# Patient Record
Sex: FEMALE | Race: WHITE | Employment: FULL TIME | URBAN - METROPOLITAN AREA
[De-identification: names, ages, dates, MRNs, and addresses within clinical notes are randomized per-mention and may not be internally consistent; named-entity substitution may affect disease eponyms.]

---

## 2018-02-08 ENCOUNTER — APPOINTMENT (OUTPATIENT)
Dept: GENERAL RADIOLOGY | Age: 53
DRG: 149 | End: 2018-02-08
Attending: EMERGENCY MEDICINE
Payer: SUBSIDIZED

## 2018-02-08 ENCOUNTER — APPOINTMENT (OUTPATIENT)
Dept: CT IMAGING | Age: 53
DRG: 149 | End: 2018-02-08
Attending: EMERGENCY MEDICINE
Payer: SUBSIDIZED

## 2018-02-08 ENCOUNTER — APPOINTMENT (OUTPATIENT)
Dept: MRI IMAGING | Age: 53
DRG: 149 | End: 2018-02-08
Attending: INTERNAL MEDICINE
Payer: SUBSIDIZED

## 2018-02-08 ENCOUNTER — HOSPITAL ENCOUNTER (INPATIENT)
Age: 53
LOS: 2 days | Discharge: HOME | DRG: 149 | End: 2018-02-10
Attending: EMERGENCY MEDICINE | Admitting: INTERNAL MEDICINE
Payer: SUBSIDIZED

## 2018-02-08 DIAGNOSIS — R42 VERTIGO: Primary | ICD-10-CM

## 2018-02-08 DIAGNOSIS — I16.0 HYPERTENSIVE URGENCY: ICD-10-CM

## 2018-02-08 PROBLEM — I10 HTN (HYPERTENSION), MALIGNANT: Status: ACTIVE | Noted: 2018-02-08

## 2018-02-08 LAB
ALBUMIN SERPL-MCNC: 3.5 G/DL (ref 3.5–5)
ALBUMIN/GLOB SERPL: 0.7 {RATIO} (ref 1.1–2.2)
ALP SERPL-CCNC: 91 U/L (ref 45–117)
ALT SERPL-CCNC: 14 U/L (ref 12–78)
ANION GAP SERPL CALC-SCNC: 6 MMOL/L (ref 5–15)
APPEARANCE UR: ABNORMAL
AST SERPL-CCNC: 26 U/L (ref 15–37)
ATRIAL RATE: 60 BPM
BACTERIA URNS QL MICRO: NEGATIVE /HPF
BASOPHILS # BLD: 0 K/UL (ref 0–0.1)
BASOPHILS NFR BLD: 0 % (ref 0–1)
BILIRUB SERPL-MCNC: 0.4 MG/DL (ref 0.2–1)
BILIRUB UR QL: NEGATIVE
BUN SERPL-MCNC: 10 MG/DL (ref 6–20)
BUN/CREAT SERPL: 13 (ref 12–20)
CALCIUM SERPL-MCNC: 8.7 MG/DL (ref 8.5–10.1)
CALCULATED R AXIS, ECG10: -160 DEGREES
CALCULATED T AXIS, ECG11: -142 DEGREES
CHLORIDE SERPL-SCNC: 105 MMOL/L (ref 97–108)
CO2 SERPL-SCNC: 28 MMOL/L (ref 21–32)
COLOR UR: ABNORMAL
CREAT SERPL-MCNC: 0.77 MG/DL (ref 0.55–1.02)
DIAGNOSIS, 93000: NORMAL
DIFFERENTIAL METHOD BLD: ABNORMAL
EOSINOPHIL # BLD: 0 K/UL (ref 0–0.4)
EOSINOPHIL NFR BLD: 0 % (ref 0–7)
EPITH CASTS URNS QL MICRO: ABNORMAL /LPF
ERYTHROCYTE [DISTWIDTH] IN BLOOD BY AUTOMATED COUNT: 14 % (ref 11.5–14.5)
GLOBULIN SER CALC-MCNC: 5 G/DL (ref 2–4)
GLUCOSE SERPL-MCNC: 119 MG/DL (ref 65–100)
GLUCOSE UR STRIP.AUTO-MCNC: NEGATIVE MG/DL
HCT VFR BLD AUTO: 40 % (ref 35–47)
HGB BLD-MCNC: 12.5 G/DL (ref 11.5–16)
HGB UR QL STRIP: NEGATIVE
HYALINE CASTS URNS QL MICRO: ABNORMAL /LPF (ref 0–5)
IMM GRANULOCYTES # BLD: 0 K/UL (ref 0–0.04)
IMM GRANULOCYTES NFR BLD AUTO: 0 % (ref 0–0.5)
KETONES UR QL STRIP.AUTO: NEGATIVE MG/DL
LEUKOCYTE ESTERASE UR QL STRIP.AUTO: NEGATIVE
LYMPHOCYTES # BLD: 0.7 K/UL (ref 0.8–3.5)
LYMPHOCYTES NFR BLD: 8 % (ref 12–49)
MCH RBC QN AUTO: 27.1 PG (ref 26–34)
MCHC RBC AUTO-ENTMCNC: 31.3 G/DL (ref 30–36.5)
MCV RBC AUTO: 86.8 FL (ref 80–99)
MONOCYTES # BLD: 0.3 K/UL (ref 0–1)
MONOCYTES NFR BLD: 3 % (ref 5–13)
NEUTS SEG # BLD: 8.3 K/UL (ref 1.8–8)
NEUTS SEG NFR BLD: 89 % (ref 32–75)
NITRITE UR QL STRIP.AUTO: NEGATIVE
NRBC # BLD: 0 K/UL (ref 0–0.01)
NRBC BLD-RTO: 0 PER 100 WBC
P-R INTERVAL, ECG05: 170 MS
PH UR STRIP: 8 [PH] (ref 5–8)
PLATELET # BLD AUTO: 351 K/UL (ref 150–400)
PMV BLD AUTO: 9.1 FL (ref 8.9–12.9)
POTASSIUM SERPL-SCNC: 3.6 MMOL/L (ref 3.5–5.1)
PROT SERPL-MCNC: 8.5 G/DL (ref 6.4–8.2)
PROT UR STRIP-MCNC: NEGATIVE MG/DL
Q-T INTERVAL, ECG07: 428 MS
QRS DURATION, ECG06: 86 MS
QTC CALCULATION (BEZET), ECG08: 428 MS
RBC # BLD AUTO: 4.61 M/UL (ref 3.8–5.2)
RBC #/AREA URNS HPF: ABNORMAL /HPF (ref 0–5)
RBC MORPH BLD: ABNORMAL
SODIUM SERPL-SCNC: 139 MMOL/L (ref 136–145)
SP GR UR REFRACTOMETRY: 1.01 (ref 1–1.03)
TROPONIN I SERPL-MCNC: <0.04 NG/ML
UA: UC IF INDICATED,UAUC: ABNORMAL
UROBILINOGEN UR QL STRIP.AUTO: 0.2 EU/DL (ref 0.2–1)
VENTRICULAR RATE, ECG03: 60 BPM
WBC # BLD AUTO: 9.3 K/UL (ref 3.6–11)
WBC URNS QL MICRO: ABNORMAL /HPF (ref 0–4)

## 2018-02-08 PROCEDURE — 99285 EMERGENCY DEPT VISIT HI MDM: CPT

## 2018-02-08 PROCEDURE — 81001 URINALYSIS AUTO W/SCOPE: CPT | Performed by: EMERGENCY MEDICINE

## 2018-02-08 PROCEDURE — 74011250637 HC RX REV CODE- 250/637: Performed by: INTERNAL MEDICINE

## 2018-02-08 PROCEDURE — 74011250637 HC RX REV CODE- 250/637: Performed by: EMERGENCY MEDICINE

## 2018-02-08 PROCEDURE — 84484 ASSAY OF TROPONIN QUANT: CPT | Performed by: EMERGENCY MEDICINE

## 2018-02-08 PROCEDURE — 70551 MRI BRAIN STEM W/O DYE: CPT

## 2018-02-08 PROCEDURE — 70450 CT HEAD/BRAIN W/O DYE: CPT

## 2018-02-08 PROCEDURE — 93005 ELECTROCARDIOGRAM TRACING: CPT

## 2018-02-08 PROCEDURE — 85025 COMPLETE CBC W/AUTO DIFF WBC: CPT | Performed by: EMERGENCY MEDICINE

## 2018-02-08 PROCEDURE — 74011250636 HC RX REV CODE- 250/636: Performed by: EMERGENCY MEDICINE

## 2018-02-08 PROCEDURE — 36415 COLL VENOUS BLD VENIPUNCTURE: CPT | Performed by: EMERGENCY MEDICINE

## 2018-02-08 PROCEDURE — 96374 THER/PROPH/DIAG INJ IV PUSH: CPT

## 2018-02-08 PROCEDURE — 65660000000 HC RM CCU STEPDOWN

## 2018-02-08 PROCEDURE — 80053 COMPREHEN METABOLIC PANEL: CPT | Performed by: EMERGENCY MEDICINE

## 2018-02-08 PROCEDURE — 71045 X-RAY EXAM CHEST 1 VIEW: CPT

## 2018-02-08 PROCEDURE — 96375 TX/PRO/DX INJ NEW DRUG ADDON: CPT

## 2018-02-08 PROCEDURE — 74011250636 HC RX REV CODE- 250/636: Performed by: INTERNAL MEDICINE

## 2018-02-08 PROCEDURE — 74011000250 HC RX REV CODE- 250: Performed by: INTERNAL MEDICINE

## 2018-02-08 PROCEDURE — 96361 HYDRATE IV INFUSION ADD-ON: CPT

## 2018-02-08 RX ORDER — SODIUM CHLORIDE 0.9 % (FLUSH) 0.9 %
5-10 SYRINGE (ML) INJECTION EVERY 8 HOURS
Status: DISCONTINUED | OUTPATIENT
Start: 2018-02-08 | End: 2018-02-10 | Stop reason: HOSPADM

## 2018-02-08 RX ORDER — HYDROCHLOROTHIAZIDE 25 MG/1
25 TABLET ORAL DAILY
Status: DISCONTINUED | OUTPATIENT
Start: 2018-02-09 | End: 2018-02-10 | Stop reason: HOSPADM

## 2018-02-08 RX ORDER — HYDRALAZINE HYDROCHLORIDE 20 MG/ML
20 INJECTION INTRAMUSCULAR; INTRAVENOUS
Status: COMPLETED | OUTPATIENT
Start: 2018-02-08 | End: 2018-02-08

## 2018-02-08 RX ORDER — FACIAL-BODY WIPES
10 EACH TOPICAL DAILY PRN
Status: DISCONTINUED | OUTPATIENT
Start: 2018-02-08 | End: 2018-02-10 | Stop reason: HOSPADM

## 2018-02-08 RX ORDER — HYDROCHLOROTHIAZIDE 25 MG/1
25 TABLET ORAL
Status: COMPLETED | OUTPATIENT
Start: 2018-02-08 | End: 2018-02-08

## 2018-02-08 RX ORDER — ENOXAPARIN SODIUM 100 MG/ML
40 INJECTION SUBCUTANEOUS EVERY 24 HOURS
Status: DISCONTINUED | OUTPATIENT
Start: 2018-02-08 | End: 2018-02-09

## 2018-02-08 RX ORDER — HYDRALAZINE HYDROCHLORIDE 20 MG/ML
10 INJECTION INTRAMUSCULAR; INTRAVENOUS
Status: DISCONTINUED | OUTPATIENT
Start: 2018-02-08 | End: 2018-02-10 | Stop reason: HOSPADM

## 2018-02-08 RX ORDER — ONDANSETRON 2 MG/ML
4 INJECTION INTRAMUSCULAR; INTRAVENOUS
Status: DISCONTINUED | OUTPATIENT
Start: 2018-02-08 | End: 2018-02-10 | Stop reason: HOSPADM

## 2018-02-08 RX ORDER — LORAZEPAM 2 MG/ML
0.5 INJECTION, SOLUTION INTRAMUSCULAR; INTRAVENOUS ONCE
Status: DISCONTINUED | OUTPATIENT
Start: 2018-02-08 | End: 2018-02-08

## 2018-02-08 RX ORDER — MECLIZINE HCL 12.5 MG 12.5 MG/1
25 TABLET ORAL EVERY 6 HOURS
Status: DISCONTINUED | OUTPATIENT
Start: 2018-02-08 | End: 2018-02-10 | Stop reason: HOSPADM

## 2018-02-08 RX ORDER — MECLIZINE HCL 12.5 MG 12.5 MG/1
25 TABLET ORAL
Status: COMPLETED | OUTPATIENT
Start: 2018-02-08 | End: 2018-02-08

## 2018-02-08 RX ORDER — ASPIRIN 81 MG/1
81 TABLET ORAL DAILY
Status: DISCONTINUED | OUTPATIENT
Start: 2018-02-09 | End: 2018-02-10 | Stop reason: HOSPADM

## 2018-02-08 RX ORDER — CARVEDILOL 12.5 MG/1
12.5 TABLET ORAL 2 TIMES DAILY WITH MEALS
Status: DISCONTINUED | OUTPATIENT
Start: 2018-02-08 | End: 2018-02-10 | Stop reason: HOSPADM

## 2018-02-08 RX ORDER — DIAZEPAM 10 MG/2ML
2 INJECTION INTRAMUSCULAR
Status: DISCONTINUED | OUTPATIENT
Start: 2018-02-08 | End: 2018-02-08

## 2018-02-08 RX ORDER — LORAZEPAM 2 MG/ML
1 INJECTION INTRAMUSCULAR
Status: DISCONTINUED | OUTPATIENT
Start: 2018-02-08 | End: 2018-02-10 | Stop reason: HOSPADM

## 2018-02-08 RX ORDER — SODIUM CHLORIDE 0.9 % (FLUSH) 0.9 %
5-10 SYRINGE (ML) INJECTION AS NEEDED
Status: DISCONTINUED | OUTPATIENT
Start: 2018-02-08 | End: 2018-02-10 | Stop reason: HOSPADM

## 2018-02-08 RX ORDER — LORAZEPAM 2 MG/ML
0.5 INJECTION INTRAMUSCULAR ONCE
Status: COMPLETED | OUTPATIENT
Start: 2018-02-08 | End: 2018-02-08

## 2018-02-08 RX ORDER — AMLODIPINE BESYLATE 5 MG/1
5 TABLET ORAL DAILY
Status: DISCONTINUED | OUTPATIENT
Start: 2018-02-08 | End: 2018-02-09

## 2018-02-08 RX ORDER — ASPIRIN 81 MG/1
81 TABLET ORAL DAILY
COMMUNITY
End: 2019-05-08

## 2018-02-08 RX ADMIN — Medication 10 ML: at 22:13

## 2018-02-08 RX ADMIN — HYDROCHLOROTHIAZIDE 25 MG: 25 TABLET ORAL at 12:51

## 2018-02-08 RX ADMIN — MECLIZINE 25 MG: 12.5 TABLET ORAL at 17:55

## 2018-02-08 RX ADMIN — MECLIZINE 25 MG: 12.5 TABLET ORAL at 10:53

## 2018-02-08 RX ADMIN — SODIUM CHLORIDE 5 MG: 9 INJECTION INTRAMUSCULAR; INTRAVENOUS; SUBCUTANEOUS at 18:25

## 2018-02-08 RX ADMIN — CARVEDILOL 12.5 MG: 12.5 TABLET, FILM COATED ORAL at 15:50

## 2018-02-08 RX ADMIN — AMLODIPINE BESYLATE 5 MG: 5 TABLET ORAL at 17:55

## 2018-02-08 RX ADMIN — ONDANSETRON HYDROCHLORIDE 4 MG: 2 INJECTION, SOLUTION INTRAMUSCULAR; INTRAVENOUS at 22:22

## 2018-02-08 RX ADMIN — HYDRALAZINE HYDROCHLORIDE 20 MG: 20 INJECTION INTRAMUSCULAR; INTRAVENOUS at 10:53

## 2018-02-08 RX ADMIN — LORAZEPAM 0.5 MG: 2 INJECTION INTRAMUSCULAR; INTRAVENOUS at 14:38

## 2018-02-08 RX ADMIN — NITROGLYCERIN 1 INCH: 20 OINTMENT TOPICAL at 17:55

## 2018-02-08 RX ADMIN — HYDRALAZINE HYDROCHLORIDE 10 MG: 20 INJECTION INTRAMUSCULAR; INTRAVENOUS at 15:50

## 2018-02-08 RX ADMIN — ENOXAPARIN SODIUM 40 MG: 40 INJECTION SUBCUTANEOUS at 22:13

## 2018-02-08 RX ADMIN — SODIUM CHLORIDE 1000 ML: 900 INJECTION, SOLUTION INTRAVENOUS at 11:08

## 2018-02-08 NOTE — ED PROVIDER NOTES
EMERGENCY DEPARTMENT HISTORY AND PHYSICAL EXAM      Date: 2/8/2018  Patient Name: Massiel Russo    History of Presenting Illness     Chief Complaint   Patient presents with    Nausea     since this morning at 0500    Vomiting    Dizziness       History Provided By: Patient    HPI: Massiel Russo, 45 y.o. female with PMHx of vertigo presents via EMS to the ED with cc of dizziness that onset at 5 AM after getting off work this morning. Pt also reports HA that started last night, and nausea and chills this morning. Pt states that after the dizziness onset, she had to sit down and was unable to get up for 1 hour because of the severity of her sx. She states that her sx felt similar to vertigo and attributed it to working long hours. Pt reports that leaning backward in a seated position makes the dizziness worse. She states she took 2 81 mg ASA last night with no relief of HA. Pt denies receiving the flu shot this year. She states she used to take HTN medication but was taken off it in 2011. Pt denies CP, fever, or SOB. PCP: PROVIDER UNKNOWN    There are no other complaints, changes, or physical findings at this time. Past History     Past Medical History:  History reviewed. No pertinent past medical history. Past Surgical History:  History reviewed. No pertinent surgical history. Family History:  History reviewed. No pertinent family history. Social History:  Social History   Substance Use Topics    Smoking status: Never Smoker    Smokeless tobacco: Never Used    Alcohol use Yes      Comment: occasional       Allergies: Allergies   Allergen Reactions    Hydrocodone Hives    Tylenol [Acetaminophen] Vertigo         Review of Systems   Review of Systems   Constitutional: Positive for chills. Negative for activity change and fever. HENT: Negative for congestion and sore throat. Eyes: Negative for pain and redness. Respiratory: Negative for cough, chest tightness and shortness of breath. Cardiovascular: Negative for chest pain and palpitations. Gastrointestinal: Positive for nausea. Negative for abdominal pain, diarrhea and vomiting. Genitourinary: Negative for dysuria, frequency and urgency. Musculoskeletal: Negative for back pain and neck pain. Skin: Negative for rash. Neurological: Positive for dizziness and headaches. Negative for syncope and light-headedness. Psychiatric/Behavioral: Negative for confusion. All other systems reviewed and are negative. Physical Exam   Physical Exam   Constitutional: She is oriented to person, place, and time. She appears well-developed and well-nourished. No distress. HENT:   Head: Normocephalic and atraumatic. Nose: Nose normal.   Mouth/Throat: Oropharynx is clear and moist.   Eyes: Conjunctivae and EOM are normal. Pupils are equal, round, and reactive to light. No scleral icterus. Conjunctiva clear bilaterally; Neck: Normal range of motion. Neck supple. No JVD present. No tracheal deviation present. No thyromegaly present. Cardiovascular: Normal rate, regular rhythm and intact distal pulses. Exam reveals no gallop and no friction rub. No murmur heard. Pulmonary/Chest: Effort normal and breath sounds normal. No stridor. No respiratory distress. She has no wheezes. She has no rales. She exhibits no tenderness. Abdominal: Soft. Bowel sounds are normal. She exhibits no distension. There is no tenderness. There is no rebound and no guarding. Musculoskeletal: Normal range of motion. She exhibits no edema or tenderness. Neurological: She is alert and oriented to person, place, and time. She displays normal reflexes. No cranial nerve deficit. She exhibits normal muscle tone.  Coordination normal.   5/5 strength throughout; no past pointing; good heel to shin; negative romberg; holds both arms extended in front of them for 10 seconds without difficulty or drift; lifts legs off of bed without difficulty; no pronator drift; good equal  strength bilaterally; motor and sensory grossly intact; no facial asymmetry; +nystagmus on lateral gaze bilaterally; Non-suspicious HINTS exam.    Skin: Skin is warm and dry. No rash noted. She is not diaphoretic. No erythema. Psychiatric: She has a normal mood and affect. Her behavior is normal.   Nursing note and vitals reviewed. Diagnostic Study Results     Labs -     Recent Results (from the past 12 hour(s))   EKG, 12 LEAD, INITIAL    Collection Time: 02/08/18  8:03 AM   Result Value Ref Range    Ventricular Rate 60 BPM    Atrial Rate 60 BPM    P-R Interval 170 ms    QRS Duration 86 ms    Q-T Interval 428 ms    QTC Calculation (Bezet) 428 ms    Calculated R Axis -160 degrees    Calculated T Axis -142 degrees    Diagnosis       Normal sinus rhythm with sinus arrhythmia  Right superior axis deviation  ST & T wave abnormality, consider inferolateral ischemia  When compared with ECG of 01-DEC-2010 18:21,  ST no longer elevated in Lateral leads  T wave inversion now evident in Anterolateral leads  Confirmed by Deandre Gentile PBRENDA ([id number]) on 2/8/2018 10:59:43 AM     CBC WITH AUTOMATED DIFF    Collection Time: 02/08/18  9:45 AM   Result Value Ref Range    WBC 9.3 3.6 - 11.0 K/uL    RBC 4.61 3.80 - 5.20 M/uL    HGB 12.5 11.5 - 16.0 g/dL    HCT 40.0 35.0 - 47.0 %    MCV 86.8 80.0 - 99.0 FL    MCH 27.1 26.0 - 34.0 PG    MCHC 31.3 30.0 - 36.5 g/dL    RDW 14.0 11.5 - 14.5 %    PLATELET 348 543 - 761 K/uL    MPV 9.1 8.9 - 12.9 FL    NRBC 0.0 0  WBC    ABSOLUTE NRBC 0.00 0.00 - 0.01 K/uL    NEUTROPHILS 89 (H) 32 - 75 %    LYMPHOCYTES 8 (L) 12 - 49 %    MONOCYTES 3 (L) 5 - 13 %    EOSINOPHILS 0 0 - 7 %    BASOPHILS 0 0 - 1 %    IMMATURE GRANULOCYTES 0 0.0 - 0.5 %    ABS. NEUTROPHILS 8.3 (H) 1.8 - 8.0 K/UL    ABS. LYMPHOCYTES 0.7 (L) 0.8 - 3.5 K/UL    ABS. MONOCYTES 0.3 0.0 - 1.0 K/UL    ABS. EOSINOPHILS 0.0 0.0 - 0.4 K/UL    ABS. BASOPHILS 0.0 0.0 - 0.1 K/UL    ABS. IMM.  GRANS. 0.0 0.00 - 0.04 K/UL DF AUTOMATED      RBC COMMENTS NORMOCYTIC, NORMOCHROMIC     METABOLIC PANEL, COMPREHENSIVE    Collection Time: 02/08/18  9:45 AM   Result Value Ref Range    Sodium 139 136 - 145 mmol/L    Potassium 3.6 3.5 - 5.1 mmol/L    Chloride 105 97 - 108 mmol/L    CO2 28 21 - 32 mmol/L    Anion gap 6 5 - 15 mmol/L    Glucose 119 (H) 65 - 100 mg/dL    BUN 10 6 - 20 MG/DL    Creatinine 0.77 0.55 - 1.02 MG/DL    BUN/Creatinine ratio 13 12 - 20      GFR est AA >60 >60 ml/min/1.73m2    GFR est non-AA >60 >60 ml/min/1.73m2    Calcium 8.7 8.5 - 10.1 MG/DL    Bilirubin, total 0.4 0.2 - 1.0 MG/DL    ALT (SGPT) 14 12 - 78 U/L    AST (SGOT) 26 15 - 37 U/L    Alk. phosphatase 91 45 - 117 U/L    Protein, total 8.5 (H) 6.4 - 8.2 g/dL    Albumin 3.5 3.5 - 5.0 g/dL    Globulin 5.0 (H) 2.0 - 4.0 g/dL    A-G Ratio 0.7 (L) 1.1 - 2.2     TROPONIN I    Collection Time: 02/08/18  9:45 AM   Result Value Ref Range    Troponin-I, Qt. <0.04 <0.05 ng/mL   URINALYSIS W/ REFLEX CULTURE    Collection Time: 02/08/18 11:09 AM   Result Value Ref Range    Color YELLOW/STRAW      Appearance CLOUDY (A) CLEAR      Specific gravity 1.012 1.003 - 1.030      pH (UA) 8.0 5.0 - 8.0      Protein NEGATIVE  NEG mg/dL    Glucose NEGATIVE  NEG mg/dL    Ketone NEGATIVE  NEG mg/dL    Bilirubin NEGATIVE  NEG      Blood NEGATIVE  NEG      Urobilinogen 0.2 0.2 - 1.0 EU/dL    Nitrites NEGATIVE  NEG      Leukocyte Esterase NEGATIVE  NEG      WBC 0-4 0 - 4 /hpf    RBC 0-5 0 - 5 /hpf    Epithelial cells MODERATE (A) FEW /lpf    Bacteria NEGATIVE  NEG /hpf    UA:UC IF INDICATED CULTURE NOT INDICATED BY UA RESULT CNI      Hyaline cast 0-2 0 - 5 /lpf       Radiologic Studies -   CT Results  (Last 48 hours)               02/08/18 1124  CT HEAD WO CONT Final result    Impression:  IMPRESSION: No acute abnormality. Narrative:  EXAM:  CT HEAD WO CONT       INDICATION:   Dizziness, nausea, vomiting       COMPARISON: None. CONTRAST:  None.        TECHNIQUE: Unenhanced CT of the head was performed using 5 mm images. Brain and   bone windows were generated. CT dose reduction was achieved through use of a   standardized protocol tailored for this examination and automatic exposure   control for dose modulation. FINDINGS:   The ventricles and sulci are normal in size, shape and configuration and   midline. There is no significant white matter disease. There is no intracranial   hemorrhage, extra-axial collection, mass, mass effect or midline shift. The   basilar cisterns are open. No acute infarct is identified. The bone windows   demonstrate no abnormalities. The visualized portions of the paranasal sinuses   and mastoid air cells are clear. CXR Results  (Last 48 hours)               02/08/18 1158  XR CHEST PORT Final result    Impression:  Impression: No acute process. Narrative: Indication: Nausea, vomiting, dizziness this morning       Comparison: None       Portable exam of the chest obtained at 1157 demonstrates normal heart size. There is no acute process in the lung fields. The osseous structures are   unremarkable. Medical Decision Making   I am the first provider for this patient. I reviewed the vital signs, available nursing notes, past medical history, past surgical history, family history and social history. Vital Signs-Reviewed the patient's vital signs. Patient Vitals for the past 12 hrs:   Temp Pulse Resp BP SpO2   02/08/18 1230 - - - (!) 175/103 98 %   02/08/18 1200 - - - (!) 181/108 98 %   02/08/18 1136 - - - (!) 180/100 98 %   02/08/18 1113 - - - - 98 %   02/08/18 1112 - - - (!) 177/98 -   02/08/18 1030 - - - (!) 184/111 96 %   02/08/18 0758 98.2 °F (36.8 °C) (!) 59 16 (!) 159/123 100 %       ED EKG interpretation: 08:03  Rhythm: normal sinus rhythm; and regular .  Rate (approx.): 60; Axis: right axis deviation; MD Interval: normal; QRS interval: normal ; ST/T wave: non-specific changes; t inversion v3-v6; new since 2010; This EKG was interpreted by Norma Lopez MD,ED Provider. Records Reviewed: Old Medical Records    Provider Notes (Medical Decision Making):   DDx: vertigo, ICH, hypertensive urgency    ED Course:   Initial assessment performed. The patients presenting problems have been discussed, and they are in agreement with the care plan formulated and outlined with them. I have encouraged them to ask questions as they arise throughout their visit. 2:10 PM  Pt still reporting dizziness. Consult Note:  2:19 PM  Norma Lopez MD spoke with Florinda Pickering MD  Specialty: Hospitalist  Discussed pt's hx, disposition, and available diagnostic and imaging results. Reviewed care plans. Consultant agrees with plans as outlined. Dr. Kayode Madrid will admit. Disposition:  Admit Note:  2:22 PM  Pt is being admitted by Dr. Kayode Madrid. The results of their tests and reason(s) for their admission have been discussed with pt and/or available family. They convey agreement and understanding for the need to be admitted and for admission diagnosis. Will admit to hospitalist for vertigo and hypertensive urgency; pt currently not on any antihypertensives; pt still vertiginous and with no sig improvement after meclizine or valium; no neuro deficits; head ct without acute abnormality; cbc,cmp, and troponin unremarkable;  Norma Lopez MD      Diagnosis     Clinical Impression:   1. Vertigo    2. Hypertensive urgency        Attestations: This note is prepared by Agusto Mustafa, acting as a Scribe for MD Norma George MD: The scribe's documentation has been prepared under my direction and personally reviewed by me in its entirety. I confirm that the notes above accurately reflects all work, treatment, procedures, and medical decision making performed by me.

## 2018-02-08 NOTE — ED TRIAGE NOTES
Pt states that she started vomiting this am.  States that she had some abd pain last night. States that she has sinus problems.   States that she started vomiting up blood this am. States had a normal bm this am.

## 2018-02-08 NOTE — ED NOTES
TRANSFER - OUT REPORT:    Verbal report given to Ginny Rand RN(name) on [employer]  being transferred to Critical access hospital(unit) for routine progression of care       Report consisted of patients Situation, Background, Assessment and   Recommendations(SBAR). Information from the following report(s) SBAR was reviewed with the receiving nurse. Lines:   Peripheral IV 02/08/18 Right Antecubital (Active)        Opportunity for questions and clarification was provided.       Patient transported with:   "[employer]

## 2018-02-08 NOTE — IP AVS SNAPSHOT
Summary of Care Report The Summary of Care report has been created to help improve care coordination. Users with access to [employer] or [employer] (Web-based application) may access additional patient information including the Discharge Summary. If you are not currently a [address] user and need more information, please call the number listed below in the [place] [address] section and ask to be connected with Medical Records. Facility Information Name Address Phone [address] [address] [phone] Patient Information Patient Name Sex  Octavio Segovia ([id number]) Female 1965 Discharge Information Admitting Provider Service Area Unit Medina Munson MD / [facility] / [phone] Discharge Provider Discharge Date/Time Discharge Disposition Destination (none) 2/10/2018 (Pending) AHR (none) Patient Language Language ENGLISH [13] Hospital Problems as of 2/10/2018  Reviewed: 2/10/2018  9:29 AM by Luly Garcia MD  
  
  
  
 Class Noted - Resolved Last Modified POA Active Problems HTN (hypertension), malignant  2018 - Present 2018 by Medina Munson MD Unknown Entered by Medina Munson MD  
  
Non-Hospital Problems as of 2/10/2018  Reviewed: 2/10/2018  9:29 AM by Luly Garcia MD  
 None You are allergic to the following Allergen Reactions Hydrocodone Hives Tylenol (Acetaminophen) Vertigo Current Discharge Medication List  
  
START taking these medications Dose & Instructions Dispensing Information Comments  
 atorvastatin 40 mg tablet Commonly known as:  LIPITOR Dose:  40 mg Take 1 Tab by mouth nightly. Quantity:  30 Tab Refills:  0  
   
 carvedilol 12.5 mg tablet Commonly known as:  Michelle Rings  Dose:  12.5 mg  
 Take 1 Tab by mouth two (2) times daily (with meals). Quantity:  60 Tab Refills:  0  
   
 hydroCHLOROthiazide 25 mg tablet Commonly known as:  HYDRODIURIL Start taking on:  2/11/2018 Dose:  25 mg Take 1 Tab by mouth daily. Quantity:  30 Tab Refills:  0  
   
 meclizine 25 mg tablet Commonly known as:  ANTIVERT Dose:  25 mg Take 1 Tab by mouth three (3) times daily as needed for up to 10 days. Quantity:  40 Tab Refills:  0  
   
 pseudoephedrine hcl 15 mg/5 mL Liqd Commonly known as:  SUDAFED Dose:  15 mg Take 5 mL by mouth every four (4) hours as needed. Quantity:  1 Bottle Refills:  0 CONTINUE these medications which have NOT CHANGED Dose & Instructions Dispensing Information Comments  
 aspirin delayed-release 81 mg tablet Dose:  81 mg Take 81 mg by mouth daily. Refills:  0 Current Immunizations Name Date Influenza Vaccine (Quad) PF 2/9/2018 Follow-up Information Follow up With Details Comments Contact Info Provider Unknown   Patient not available to ask [facility] In 2 weeks  [address] [facility] [address] [facility] [address] Discharge Instructions Epley Maneuver for Vertigo: Exercises Your Care Instructions The Epley Maneuver is a series of movements your doctor may use to treat your vertigo. Here are the steps for the exercises. Your doctor or physical therapist will guide you through the movements. A single 10- to 15-minute session often is all that's needed. Crystal debris (canaliths) cause the vertigo. When your head is moved into different positions, the debris moves freely. This may cause your symptoms to stop. How to do the exercises Step 1 
 
1. You will sit on the doctor's exam table. Your legs will be out in front of you.  The doctor or physical therapist will turn your head so that it is [facility] between looking straight ahead and looking to the side that causes the worst vertigo. 2. Without changing your head position, he or she will guide you back quickly. Your shoulders will be on the table. Your head will hang over the edge of the table. At this point, the side of your head that is causing the worst vertigo will face the floor. You'll stay in this position for 30 seconds or until your symptoms stop. Step 2 1. Then, the doctor or physical therapist will turn your head to the other side. You don't need to lift your head. The other side of your head will face the floor. You will stay in this position for 30 seconds or until your symptoms stop. Step 3 1. The doctor or physical therapist will help you roll your body in the same direction that your head is facing. You will lie on your side. (For example, if you are looking to your right, you will roll onto your right side.) The side that causes the worst symptoms should be facing up. You'll stay in this position for another 30 seconds or until your symptoms stop. Step 4 
 
1. The doctor or physical therapist will then help you to sit back up. Your legs will hang off the table on the same side that you were facing. Follow-up care is a key part of your treatment and safety. Be sure to make and go to all appointments, and call your doctor if you are having problems. It's also a good idea to know your test results and keep a list of the medicines you take. Where can you learn more? Go to http://kelsey-swapna.info/. Enter B077 in the search box to learn more about \"Epley Maneuver for Vertigo: Exercises. \" Current as of: May 12, 2017 Content Version: 11.4 © 3206-9810 [employer]. Care instructions adapted under license by [employer] (which disclaims liability or warranty for this information).  If you have questions about a medical condition or this instruction, always ask your healthcare professional. [place] [address] any warranty or liability for your use of this information. Chart Review Routing History Recipient Method Report Sent By Brenton Krishna Provider Unknown, MD  
Patient not available to ask [address] [employer] Mail IP Auto Routed Notes Vanesa Reyes MD [[id number]] 2/8/2018  4:03 PM 02/08/2018 Provider Unknown, MD  
Patient not available to ask [address] [employer] Mail IP Auto Routed Notes Josr Matthew MD [[id number]] 2/10/2018  9:38 AM 02/10/2018

## 2018-02-08 NOTE — PROGRESS NOTES
Pharmacy Clarification of Prior to Admission Medication Regimen     The patient was interviewed regarding clarification of the prior to admission medication regimen and was questioned regarding use of any other inhalers, topical products, over the counter medications, herbal medications, vitamin products or ophthalmic/nasal/otic medication use. Information Obtained From: Patient    Pertinent Pharmacy Findings:   Updated patients preferred outpatient pharmacy to: [address], [place] - [address] [facility]   aspirin delayed-release 81 mg tablet: Patient stated she took 2 tablets last night for \"sinus pressure. \"    PTA medication list was corrected to the following:     Prior to Admission Medications   Prescriptions Last Dose Informant Patient Reported? Taking?   aspirin delayed-release 81 mg tablet  Self Yes Yes   Sig: Take 81 mg by mouth daily.       Facility-Administered Medications: None          Thank you,  Deborah Nissen, CPhT  Medication History Pharmacy Technician

## 2018-02-08 NOTE — H&P
Hospitalist Admission Note    NAME: Giovanna Reeves   :  1965   MRN:  [id number]     Date/Time:  2018 3:49 PM    Patient PCP: PROVIDER UNKNOWN  ______________________________________________________________________  Given the patient's current clinical presentation, I have a high level of concern for decompensation if discharged from the emergency department. Complex decision making was performed, which includes reviewing the patient's available past medical records, laboratory results, and x-ray films. My assessment of this patient's clinical condition and my plan of care is as follows. Assessment / Plan: Acclerated HTN   -hx of htn and was on medications at one point. Pt states she was taken off meds in . Had not seen a doctor since then  -troponin neg, EKG with NSR.  R axis deviation. -head CT neg   -elevated BP likely due to untreated HTN exacerbated by nausea/vomiting  -BP remains elevated despite HCTZ 25mg and IV  Hydralazine 20mg  -will start amlodipine, coreg, and nitropaste  -hydralazine prn  -check Echo    BPV vs CVA  -pt with hx of vertigo in the past.  She presented here with severe HA, dizziness, nausea and vomiting. No other neurological symptoms. -head CT negative, will order MRI to r/o posterior circulation cva  -further work up pending MRI. Echo already ordered for malignant HTN  -check labs: TSH, lipids, A1C  -scheduled meclizine, antiemetics/xzofran prn  -neuro consult depending on MRI result  -PT/OT    Morbid obesity  Body mass index is 47.77 kg/(m^2). Code status: Full  Prophylaxis: Lovenox  Recommended Disposition: Home w/Family      Subjective:   CHIEF COMPLAINT: dizziness/n/v    HISTORY OF PRESENT ILLNESS:     Giovanna Reeves is a 45 y.o.  female with pmhx significant for hx of vertigo, HTN, no longer on medications since , morbid obesity, present to ED c/o sudden onset of dizziness associated with headache, nausea and vomiting. Symptoms started around last night and worst this morning when she was getting off work ~5AM  Pt states her dizziness is worst with positional changes. She took ASA 81mg x2 last night, however STEWARD persisted. Pt is currently not on any medications and have not seen a doctor since 2010. She denies any associated fever, chills, cp, sob, palpitations. No changes in vision or other neurological deficits. In the ER, vitals; T98.2, P 59, /111, SpO2 100% on RA. Labs: trop neg, cr 0.77  Head CT neg, CXR neg. We were asked to admit for work up and evaluation of the above problems. Past Medical History:   Diagnosis Date    HTN (hypertension)     Vertigo         History reviewed. No pertinent surgical history. Social History   Substance Use Topics    Smoking status: Never Smoker    Smokeless tobacco: Never Used    Alcohol use Yes      Comment: occasional        Family History   Problem Relation Age of Onset    No Known Problems Mother     No Known Problems Father      Allergies   Allergen Reactions    Hydrocodone Hives    Tylenol [Acetaminophen] Vertigo        Prior to Admission medications    Medication Sig Start Date End Date Taking? Authorizing Provider   aspirin delayed-release 81 mg tablet Take 81 mg by mouth daily. Yes Historical Provider       REVIEW OF SYSTEMS:     I am not able to complete the review of systems because:    The patient is intubated and sedated    The patient has altered mental status due to his acute medical problems    The patient has baseline aphasia from prior stroke(s)    The patient has baseline dementia and is not reliable historian    The patient is in acute medical distress and unable to provide information           Total of 12 systems reviewed as follows:       POSITIVE= BOLD text  Negative = text not BOLD  General:  fever, chills, sweats, generalized weakness, weight loss/gain,      loss of appetite   Eyes:    blurred vision, eye pain, loss of vision, double vision  ENT:    rhinorrhea, pharyngitis   Respiratory:   cough, sputum production, SOB, SANCHEZ, wheezing, pleuritic pain   Cardiology:   chest pain, palpitations, orthopnea, PND, edema, syncope   Gastrointestinal:  abdominal pain , N/V, diarrhea, dysphagia, constipation, bleeding   Genitourinary:  frequency, urgency, dysuria, hematuria, incontinence   Muskuloskeletal :  arthralgia, myalgia, back pain  Hematology:  easy bruising, nose or gum bleeding, lymphadenopathy   Dermatological: rash, ulceration, pruritis, color change / jaundice  Endocrine:   hot flashes or polydipsia   Neurological:  headache, dizziness, confusion, focal weakness, paresthesia,     Speech difficulties, memory loss, gait difficulty  Psychological: Feelings of anxiety, depression, agitation    Objective:   VITALS:    Visit Vitals    BP (!) 175/103    Pulse (!) 59    Temp 98.2 °F (36.8 °C)    Resp 16    Ht 5' 8\" (1.727 m)    Wt 142.5 kg (314 lb 2.5 oz)    SpO2 98%    BMI 47.77 kg/m2       PHYSICAL EXAM:    General:    Alert, cooperative, no distress, appears stated age. HEENT: Atraumatic, anicteric sclerae, pink conjunctivae     No oral ulcers, mucosa moist, throat clear, dentition fair  Neck:  Supple, symmetrical,  thyroid: non tender  Lungs:   Clear to auscultation bilaterally. No Wheezing or Rhonchi. No rales. Chest wall:  No tenderness  No Accessory muscle use. Heart:   Regular  rhythm,  No  murmur   No edema  Abdomen:   Soft, non-tender. Not distended. Bowel sounds normal  Extremities: No cyanosis. No clubbing,      Skin turgor normal, Capillary refill normal, Radial dial pulse 2+  Skin:     Not pale. Not Jaundiced  No rashes   Psych:  Good insight. Not depressed. Not anxious or agitated. Neurologic: EOMs intact. No facial asymmetry. No aphasia or slurred speech. Symmetrical strength, Sensation grossly intact.  Alert and oriented X 4.     _______________________________________________________________________  Care Plan discussed with:    Comments   Patient x    Family      RN x    Care Manager                    Consultant:      _______________________________________________________________________  Expected  Disposition:   Home with Family x   HH/PT/OT/RN    SNF/LTC    [facility]    ________________________________________________________________________  TOTAL TIME:  72 Minutes    Critical Care Provided     Minutes non procedure based      Comments    x Reviewed previous records   >50% of visit spent in counseling and coordination of care x Discussion with patient and/or family and questions answered       ________________________________________________________________________  Signed: Des Lowe MD    Procedures: see electronic medical records for all procedures/Xrays and details which were not copied into this note but were reviewed prior to creation of Plan.     LAB DATA REVIEWED:    Recent Results (from the past 24 hour(s))   EKG, 12 LEAD, INITIAL    Collection Time: 02/08/18  8:03 AM   Result Value Ref Range    Ventricular Rate 60 BPM    Atrial Rate 60 BPM    P-R Interval 170 ms    QRS Duration 86 ms    Q-T Interval 428 ms    QTC Calculation (Bezet) 428 ms    Calculated R Axis -160 degrees    Calculated T Axis -142 degrees    Diagnosis       Normal sinus rhythm with sinus arrhythmia  Right superior axis deviation  ST & T wave abnormality, consider inferolateral ischemia  When compared with ECG of 01-DEC-2010 18:21,  ST no longer elevated in Lateral leads  T wave inversion now evident in Anterolateral leads  Confirmed by Cherelle Vargas, P.V. ([id number]) on 2/8/2018 10:59:43 AM     CBC WITH AUTOMATED DIFF    Collection Time: 02/08/18  9:45 AM   Result Value Ref Range    WBC 9.3 3.6 - 11.0 K/uL    RBC 4.61 3.80 - 5.20 M/uL    HGB 12.5 11.5 - 16.0 g/dL    HCT 40.0 35.0 - 47.0 %    MCV 86.8 80.0 - 99.0 FL    MCH 27.1 26.0 - 34.0 PG    MCHC 31.3 30.0 - 36.5 g/dL    RDW 14.0 11.5 - 14.5 %    PLATELET 842 597 - 936 K/uL    MPV 9.1 8.9 - 12.9 FL NRBC 0.0 0  WBC    ABSOLUTE NRBC 0.00 0.00 - 0.01 K/uL    NEUTROPHILS 89 (H) 32 - 75 %    LYMPHOCYTES 8 (L) 12 - 49 %    MONOCYTES 3 (L) 5 - 13 %    EOSINOPHILS 0 0 - 7 %    BASOPHILS 0 0 - 1 %    IMMATURE GRANULOCYTES 0 0.0 - 0.5 %    ABS. NEUTROPHILS 8.3 (H) 1.8 - 8.0 K/UL    ABS. LYMPHOCYTES 0.7 (L) 0.8 - 3.5 K/UL    ABS. MONOCYTES 0.3 0.0 - 1.0 K/UL    ABS. EOSINOPHILS 0.0 0.0 - 0.4 K/UL    ABS. BASOPHILS 0.0 0.0 - 0.1 K/UL    ABS. IMM. GRANS. 0.0 0.00 - 0.04 K/UL    DF AUTOMATED      RBC COMMENTS NORMOCYTIC, NORMOCHROMIC     METABOLIC PANEL, COMPREHENSIVE    Collection Time: 02/08/18  9:45 AM   Result Value Ref Range    Sodium 139 136 - 145 mmol/L    Potassium 3.6 3.5 - 5.1 mmol/L    Chloride 105 97 - 108 mmol/L    CO2 28 21 - 32 mmol/L    Anion gap 6 5 - 15 mmol/L    Glucose 119 (H) 65 - 100 mg/dL    BUN 10 6 - 20 MG/DL    Creatinine 0.77 0.55 - 1.02 MG/DL    BUN/Creatinine ratio 13 12 - 20      GFR est AA >60 >60 ml/min/1.73m2    GFR est non-AA >60 >60 ml/min/1.73m2    Calcium 8.7 8.5 - 10.1 MG/DL    Bilirubin, total 0.4 0.2 - 1.0 MG/DL    ALT (SGPT) 14 12 - 78 U/L    AST (SGOT) 26 15 - 37 U/L    Alk.  phosphatase 91 45 - 117 U/L    Protein, total 8.5 (H) 6.4 - 8.2 g/dL    Albumin 3.5 3.5 - 5.0 g/dL    Globulin 5.0 (H) 2.0 - 4.0 g/dL    A-G Ratio 0.7 (L) 1.1 - 2.2     TROPONIN I    Collection Time: 02/08/18  9:45 AM   Result Value Ref Range    Troponin-I, Qt. <0.04 <0.05 ng/mL   URINALYSIS W/ REFLEX CULTURE    Collection Time: 02/08/18 11:09 AM   Result Value Ref Range    Color YELLOW/STRAW      Appearance CLOUDY (A) CLEAR      Specific gravity 1.012 1.003 - 1.030      pH (UA) 8.0 5.0 - 8.0      Protein NEGATIVE  NEG mg/dL    Glucose NEGATIVE  NEG mg/dL    Ketone NEGATIVE  NEG mg/dL    Bilirubin NEGATIVE  NEG      Blood NEGATIVE  NEG      Urobilinogen 0.2 0.2 - 1.0 EU/dL    Nitrites NEGATIVE  NEG      Leukocyte Esterase NEGATIVE  NEG      WBC 0-4 0 - 4 /hpf    RBC 0-5 0 - 5 /hpf    Epithelial cells MODERATE (A) FEW /lpf    Bacteria NEGATIVE  NEG /hpf    UA:UC IF INDICATED CULTURE NOT INDICATED BY UA RESULT CNI      Hyaline cast 0-2 0 - 5 /lpf

## 2018-02-08 NOTE — PROGRESS NOTES
1900 Received report from [facility], [facility]. Assumed patient care. Bedside shift change report given to Imani Jc RN (oncoming nurse) by Reid oDan RN (offgoing nurse). Report included the following information SBAR, Kardex, Intake/Output, MAR, Recent Results and Cardiac Rhythm NSR.

## 2018-02-08 NOTE — ED NOTES
Pt returned form ambulating back from bathroom and c/o sob. Reassured pt that vs were baseline and wnl and that will monitor. Noted that pt in no resp distress or discomfort at present.

## 2018-02-08 NOTE — ED NOTES
Pt returned from CT. States that she is having chest pain. MD notified. Pt notified that CXR has been added. Will continue to monitor.

## 2018-02-08 NOTE — IP AVS SNAPSHOT
[address] [facility] 
[phone] Patient: Cami Haines MRN: [id number] WMU:[id number] About your hospitalization You were admitted on:  February 8, 2018 You last received care in the:  [facility] 2 CARDIOPULMONARY CARE You were discharged on:  February 10, 2018 Why you were hospitalized Your primary diagnosis was:  Not on File Your diagnoses also included:  Htn (Hypertension), Malignant Follow-up Information Follow up With Details Comments Contact Info Provider Unknown   Patient not available to ask [facility] In 2 weeks  [address] [address] Discharge Orders None A check jailyn indicates which time of day the medication should be taken. My Medications START taking these medications Instructions Each Dose to Equal  
 Morning Noon Evening Bedtime  
 atorvastatin 40 mg tablet Commonly known as:  LIPITOR Your next dose is:  tonight Take 1 Tab by mouth nightly. 40 mg  
    
   
   
   
  
  
 carvedilol 12.5 mg tablet Commonly known as:  Alyssia Hard Your next dose is:  tonight Take 1 Tab by mouth two (2) times daily (with meals). 12.5 mg  
    
   
   
  
   
  
 hydroCHLOROthiazide 25 mg tablet Commonly known as:  HYDRODIURIL Start taking on:  2/11/2018 Your next dose is:  tomorrow Take 1 Tab by mouth daily. 25 mg  
    
  
   
   
   
  
 meclizine 25 mg tablet Commonly known as:  ANTIVERT Your next dose is:  today Take 1 Tab by mouth three (3) times daily as needed for up to 10 days. 25 mg  
    
   
   
  
   
  
 pseudoephedrine hcl 15 mg/5 mL Liqd Commonly known as:  SUDAFED Take 5 mL by mouth every four (4) hours as needed. 15 mg CONTINUE taking these medications Instructions Each Dose to Equal  
 Morning Noon Evening Bedtime aspirin delayed-release 81 mg tablet Your next dose is:  Tomorrow Take 81 mg by mouth daily. 81 mg Where to Get Your Medications Information on where to get these meds will be given to you by the nurse or doctor. ! Ask your nurse or doctor about these medications  
  atorvastatin 40 mg tablet  
 carvedilol 12.5 mg tablet  
 hydroCHLOROthiazide 25 mg tablet  
 meclizine 25 mg tablet  
 pseudoephedrine hcl 15 mg/5 mL Liqd Discharge Instructions Epley Maneuver for Vertigo: Exercises Your Care Instructions The Epley Maneuver is a series of movements your doctor may use to treat your vertigo. Here are the steps for the exercises. Your doctor or physical therapist will guide you through the movements. A single 10- to 15-minute session often is all that's needed. Crystal debris (canaliths) cause the vertigo. When your head is moved into different positions, the debris moves freely. This may cause your symptoms to stop. How to do the exercises Step 1 
 
1. You will sit on the doctor's exam table. Your legs will be out in front of you. The doctor or physical therapist will turn your head so that it is long-term between looking straight ahead and looking to the side that causes the worst vertigo. 2. Without changing your head position, he or she will guide you back quickly. Your shoulders will be on the table. Your head will hang over the edge of the table. At this point, the side of your head that is causing the worst vertigo will face the floor. You'll stay in this position for 30 seconds or until your symptoms stop. Step 2 1. Then, the doctor or physical therapist will turn your head to the other side. You don't need to lift your head. The other side of your head will face the floor. You will stay in this position for 30 seconds or until your symptoms stop. Step 3 1. The doctor or physical therapist will help you roll your body in the same direction that your head is facing. You will lie on your side. (For example, if you are looking to your right, you will roll onto your right side.) The side that causes the worst symptoms should be facing up. You'll stay in this position for another 30 seconds or until your symptoms stop. Step 4 
 
1. The doctor or physical therapist will then help you to sit back up. Your legs will hang off the table on the same side that you were facing. Follow-up care is a key part of your treatment and safety. Be sure to make and go to all appointments, and call your doctor if you are having problems. It's also a good idea to know your test results and keep a list of the medicines you take. Where can you learn more? Go to http://kelsey-swapna.info/. Enter K157 in the search box to learn more about \"Epley Maneuver for Vertigo: Exercises. \" Current as of: May 12, 2017 Content Version: 11.4 © 6200-4750 Healthwise, Incorporated. Care instructions adapted under license by [employer] (which disclaims liability or warranty for this information). If you have questions about a medical condition or this instruction, always ask your healthcare professional. [address] any warranty or liability for your use of this information. UBIKOD Announcement We are excited to announce that we are making your provider's discharge notes available to you in UBIKOD. You will see these notes when they are completed and signed by the physician that discharged you from your recent hospital stay. If you have any questions or concerns about any information you see in UBIKOD, please call the Health Information Department where you were seen or reach out to your Primary Care Provider for more information about your plan of care. Introducing [facility] SERVICES! Jessica Blanton introduces [email or web address] patient portal. Now you can access parts of your medical record, email your doctor's office, and request medication refills online. 1. In your internet browser, go to [email or web address] 2. Click on the First Time User? Click Here link in the Sign In box. You will see the New Member Sign Up page. 3. Enter your [facility] Access Code exactly as it appears below. You will not need to use this code after youve completed the sign-up process. If you do not sign up before the expiration date, you must request a new code. · [facility] Access Code: [email or web address] Expires: 5/11/2018 11:33 AM 
 
4. Enter the last four digits of your Social Security Number (xxxx) and Date of Birth (mm/dd/yyyy) as indicated and click Submit. You will be taken to the next sign-up page. 5. Create a [email or web address] ID. This will be your [email or web address] login ID and cannot be changed, so think of one that is secure and easy to remember. 6. Create a [facility] password. You can change your password at any time. 7. Enter your Password Reset Question and Answer. This can be used at a later time if you forget your password. 8. Enter your e-mail address. You will receive e-mail notification when new information is available in [address] 9. Click Sign Up. You can now view and download portions of your medical record. 10. Click the Download Summary menu link to download a portable copy of your medical information. If you have questions, please visit the Frequently Asked Questions section of the [email or web address] website. Remember, [email or web address] is NOT to be used for urgent needs. For medical emergencies, dial 911. Now available from your iPhone and Android! Providers Seen During Your Hospitalization Provider Specialty Primary office phone Nemo Mantilla MD Emergency Medicine [phone] Rose Shields MD Internal Medicine [phone] Immunizations Administered for This Admission Name Date Influenza Vaccine (Quad) PF 2/9/2018 Your Primary Care Physician (PCP) Primary Care Physician Office Phone Office Fax UNKNOWN, PROVIDER ** None ** ** None ** You are allergic to the following Allergen Reactions Hydrocodone Hives Tylenol (Acetaminophen) Vertigo Recent Documentation Height Weight BMI OB Status Smoking Status 1.727 m 142.5 kg 47.77 kg/m2 Postmenopausal Never Smoker Emergency Contacts Name Discharge Info Relation Home Work Mobile Mesha Morales DISCHARGE CAREGIVER [3] Other Relative [6] [phone] Patient Belongings The following personal items are in your possession at time of discharge: 
  Dental Appliances: None  Visual Aid: None      Home Medications: None   Jewelry: None  Clothing: At bedside, Pants, Shirt, Footwear Please provide this summary of care documentation to your next provider. Signatures-by signing, you are acknowledging that this After Visit Summary has been reviewed with you and you have received a copy. Patient Signature:  ____________________________________________________________ Date:  ____________________________________________________________  
  
Coco Hurst Provider Signature:  ____________________________________________________________ Date:  ____________________________________________________________

## 2018-02-09 LAB
ANION GAP SERPL CALC-SCNC: 8 MMOL/L (ref 5–15)
BASOPHILS # BLD: 0 K/UL (ref 0–0.1)
BASOPHILS NFR BLD: 0 % (ref 0–1)
BUN SERPL-MCNC: 9 MG/DL (ref 6–20)
BUN/CREAT SERPL: 12 (ref 12–20)
CALCIUM SERPL-MCNC: 9 MG/DL (ref 8.5–10.1)
CHLORIDE SERPL-SCNC: 102 MMOL/L (ref 97–108)
CHOLEST SERPL-MCNC: 203 MG/DL
CO2 SERPL-SCNC: 26 MMOL/L (ref 21–32)
CREAT SERPL-MCNC: 0.75 MG/DL (ref 0.55–1.02)
DIFFERENTIAL METHOD BLD: ABNORMAL
EOSINOPHIL # BLD: 0 K/UL (ref 0–0.4)
EOSINOPHIL NFR BLD: 0 % (ref 0–7)
ERYTHROCYTE [DISTWIDTH] IN BLOOD BY AUTOMATED COUNT: 14.3 % (ref 11.5–14.5)
EST. AVERAGE GLUCOSE BLD GHB EST-MCNC: 120 MG/DL
GLUCOSE SERPL-MCNC: 104 MG/DL (ref 65–100)
HBA1C MFR BLD: 5.8 % (ref 4.2–6.3)
HCT VFR BLD AUTO: 41.2 % (ref 35–47)
HDLC SERPL-MCNC: 56 MG/DL
HDLC SERPL: 3.6 {RATIO} (ref 0–5)
HGB BLD-MCNC: 12.6 G/DL (ref 11.5–16)
IMM GRANULOCYTES # BLD: 0 K/UL (ref 0–0.04)
IMM GRANULOCYTES NFR BLD AUTO: 0 % (ref 0–0.5)
LDLC SERPL CALC-MCNC: 138.8 MG/DL (ref 0–100)
LIPID PROFILE,FLP: ABNORMAL
LYMPHOCYTES # BLD: 1.3 K/UL (ref 0.8–3.5)
LYMPHOCYTES NFR BLD: 12 % (ref 12–49)
MAGNESIUM SERPL-MCNC: 2.5 MG/DL (ref 1.6–2.4)
MCH RBC QN AUTO: 26.8 PG (ref 26–34)
MCHC RBC AUTO-ENTMCNC: 30.6 G/DL (ref 30–36.5)
MCV RBC AUTO: 87.7 FL (ref 80–99)
MONOCYTES # BLD: 0.5 K/UL (ref 0–1)
MONOCYTES NFR BLD: 5 % (ref 5–13)
NEUTS SEG # BLD: 8.9 K/UL (ref 1.8–8)
NEUTS SEG NFR BLD: 82 % (ref 32–75)
NRBC # BLD: 0 K/UL (ref 0–0.01)
NRBC BLD-RTO: 0 PER 100 WBC
PLATELET # BLD AUTO: 365 K/UL (ref 150–400)
PMV BLD AUTO: 8.8 FL (ref 8.9–12.9)
POTASSIUM SERPL-SCNC: 2.9 MMOL/L (ref 3.5–5.1)
RBC # BLD AUTO: 4.7 M/UL (ref 3.8–5.2)
SODIUM SERPL-SCNC: 136 MMOL/L (ref 136–145)
TRIGL SERPL-MCNC: 41 MG/DL (ref ?–150)
TSH SERPL DL<=0.05 MIU/L-ACNC: 0.56 UIU/ML (ref 0.36–3.74)
VLDLC SERPL CALC-MCNC: 8.2 MG/DL
WBC # BLD AUTO: 10.8 K/UL (ref 3.6–11)

## 2018-02-09 PROCEDURE — 97163 PT EVAL HIGH COMPLEX 45 MIN: CPT

## 2018-02-09 PROCEDURE — 83036 HEMOGLOBIN GLYCOSYLATED A1C: CPT | Performed by: INTERNAL MEDICINE

## 2018-02-09 PROCEDURE — 83735 ASSAY OF MAGNESIUM: CPT | Performed by: NURSE PRACTITIONER

## 2018-02-09 PROCEDURE — 74011250637 HC RX REV CODE- 250/637: Performed by: INTERNAL MEDICINE

## 2018-02-09 PROCEDURE — 74011250637 HC RX REV CODE- 250/637: Performed by: GENERAL ACUTE CARE HOSPITAL

## 2018-02-09 PROCEDURE — 84443 ASSAY THYROID STIM HORMONE: CPT | Performed by: INTERNAL MEDICINE

## 2018-02-09 PROCEDURE — 65660000000 HC RM CCU STEPDOWN

## 2018-02-09 PROCEDURE — G8979 MOBILITY GOAL STATUS: HCPCS

## 2018-02-09 PROCEDURE — 93306 TTE W/DOPPLER COMPLETE: CPT

## 2018-02-09 PROCEDURE — 74011250637 HC RX REV CODE- 250/637: Performed by: NURSE PRACTITIONER

## 2018-02-09 PROCEDURE — 80048 BASIC METABOLIC PNL TOTAL CA: CPT | Performed by: INTERNAL MEDICINE

## 2018-02-09 PROCEDURE — 90686 IIV4 VACC NO PRSV 0.5 ML IM: CPT | Performed by: GENERAL ACUTE CARE HOSPITAL

## 2018-02-09 PROCEDURE — 97165 OT EVAL LOW COMPLEX 30 MIN: CPT | Performed by: OCCUPATIONAL THERAPIST

## 2018-02-09 PROCEDURE — G8978 MOBILITY CURRENT STATUS: HCPCS

## 2018-02-09 PROCEDURE — 36415 COLL VENOUS BLD VENIPUNCTURE: CPT | Performed by: INTERNAL MEDICINE

## 2018-02-09 PROCEDURE — 97530 THERAPEUTIC ACTIVITIES: CPT

## 2018-02-09 PROCEDURE — 74011250636 HC RX REV CODE- 250/636: Performed by: INTERNAL MEDICINE

## 2018-02-09 PROCEDURE — 90471 IMMUNIZATION ADMIN: CPT

## 2018-02-09 PROCEDURE — 74011250636 HC RX REV CODE- 250/636: Performed by: GENERAL ACUTE CARE HOSPITAL

## 2018-02-09 PROCEDURE — 85025 COMPLETE CBC W/AUTO DIFF WBC: CPT | Performed by: INTERNAL MEDICINE

## 2018-02-09 PROCEDURE — 80061 LIPID PANEL: CPT | Performed by: INTERNAL MEDICINE

## 2018-02-09 PROCEDURE — G8980 MOBILITY D/C STATUS: HCPCS

## 2018-02-09 RX ORDER — ENOXAPARIN SODIUM 100 MG/ML
40 INJECTION SUBCUTANEOUS EVERY 12 HOURS
Status: DISCONTINUED | OUTPATIENT
Start: 2018-02-09 | End: 2018-02-10 | Stop reason: HOSPADM

## 2018-02-09 RX ORDER — ATORVASTATIN CALCIUM 40 MG/1
40 TABLET, FILM COATED ORAL
Status: DISCONTINUED | OUTPATIENT
Start: 2018-02-09 | End: 2018-02-10 | Stop reason: HOSPADM

## 2018-02-09 RX ORDER — POTASSIUM CHLORIDE 750 MG/1
40 TABLET, FILM COATED, EXTENDED RELEASE ORAL
Status: COMPLETED | OUTPATIENT
Start: 2018-02-09 | End: 2018-02-09

## 2018-02-09 RX ADMIN — HYDROCHLOROTHIAZIDE 25 MG: 25 TABLET ORAL at 08:47

## 2018-02-09 RX ADMIN — MECLIZINE 25 MG: 12.5 TABLET ORAL at 23:59

## 2018-02-09 RX ADMIN — Medication 10 ML: at 13:27

## 2018-02-09 RX ADMIN — ASPIRIN 81 MG: 81 TABLET, COATED ORAL at 08:48

## 2018-02-09 RX ADMIN — ATORVASTATIN CALCIUM 40 MG: 40 TABLET, FILM COATED ORAL at 21:19

## 2018-02-09 RX ADMIN — CARVEDILOL 12.5 MG: 12.5 TABLET, FILM COATED ORAL at 08:48

## 2018-02-09 RX ADMIN — Medication 10 ML: at 21:19

## 2018-02-09 RX ADMIN — Medication 10 ML: at 05:45

## 2018-02-09 RX ADMIN — INFLUENZA VIRUS VACCINE 0.5 ML: 15; 15; 15; 15 SUSPENSION INTRAMUSCULAR at 08:48

## 2018-02-09 RX ADMIN — MECLIZINE 25 MG: 12.5 TABLET ORAL at 00:26

## 2018-02-09 RX ADMIN — MECLIZINE 25 MG: 12.5 TABLET ORAL at 13:27

## 2018-02-09 RX ADMIN — CARVEDILOL 12.5 MG: 12.5 TABLET, FILM COATED ORAL at 17:38

## 2018-02-09 RX ADMIN — NITROGLYCERIN 1 INCH: 20 OINTMENT TOPICAL at 08:48

## 2018-02-09 RX ADMIN — POTASSIUM CHLORIDE 40 MEQ: 750 TABLET, FILM COATED, EXTENDED RELEASE ORAL at 08:48

## 2018-02-09 RX ADMIN — MECLIZINE 25 MG: 12.5 TABLET ORAL at 17:38

## 2018-02-09 RX ADMIN — MECLIZINE 25 MG: 12.5 TABLET ORAL at 05:45

## 2018-02-09 RX ADMIN — POTASSIUM CHLORIDE 40 MEQ: 750 TABLET, EXTENDED RELEASE ORAL at 14:51

## 2018-02-09 RX ADMIN — NITROGLYCERIN 1 INCH: 20 OINTMENT TOPICAL at 17:38

## 2018-02-09 RX ADMIN — ENOXAPARIN SODIUM 40 MG: 40 INJECTION SUBCUTANEOUS at 14:52

## 2018-02-09 NOTE — PROGRESS NOTES
1900 Received report from [facility]. Assumed patient care. Bedside shift change report given to Cb Andrade RN (oncoming nurse) by Quentin Zamora RN (offgoing nurse). Report included the following information SBAR, Kardex, Intake/Output, MAR, Recent Results and Cardiac Rhythm NSR.

## 2018-02-09 NOTE — PROGRESS NOTES
Problem: Falls - Risk of  Goal: *Absence of Falls  Document David Fall Risk and appropriate interventions in the flowsheet.    Outcome: Progressing Towards Goal  Fall Risk Interventions:

## 2018-02-09 NOTE — PROGRESS NOTES
Cardiopulmonary Care Interdisciplinary rounds were held today to discuss patient plan of care and outcomes. The following members were present: NP/Physician, PT, Pharmacy, Nursing, Nutritionist and Case Management.     Expected Length of Stay:  2d 2h  Geometric Length of Stay: DRG GLOS: 2.1    Plan of Care: Continue current treatment plan

## 2018-02-09 NOTE — PROGRESS NOTES
Bedside shift change report given to Padmini Smith RN (oncoming nurse) by Kris Rushing RN (offgoing nurse). Report included the following information SBAR. SHIFT SUMMARY:            [address] NURSING NOTE   Admission Date 2/8/2018   Admission Diagnosis HTN (hypertension), malignant   Consults IP CONSULT TO HOSPITALIST      Cardiac Monitoring [x] Yes [] No      Purposeful Hourly Rounding [x] Yes    David Score Total Score: 1   David score 3 or > [x] Bed Alarm [] Avasys [] 1:1 sitter [] Patient refused (Place signed refusal form in chart)   Teo Score Teo Score: 21   Teo score 14 or < [] PMT consult [] Wound Care consult    []  Specialty bed  [] Nutrition consult      Influenza Vaccine Received Flu Vaccine for Current Season (usually Sept-March): No    Patient/Guardian Refused (Notify MD): No      Oxygen needs? [x] Room air Oxygen @  []1L    []2L    []3L   []4L    []5L   []6L     Use home O2? [] Yes [] No  Perform O2 challenge test using  smartphrase (.Homeoxygen)      Last bowel movement Last Bowel Movement Date: 02/07/18      Urinary Catheter             LDAs               Peripheral IV 02/08/18 Right Antecubital (Active)   Site Assessment Clean, dry, & intact 2/9/2018  8:01 AM   Phlebitis Assessment 0 2/9/2018  8:01 AM   Infiltration Assessment 0 2/9/2018  8:01 AM   Dressing Status Clean, dry, & intact 2/9/2018  8:01 AM   Dressing Type Transparent 2/9/2018  8:01 AM   Hub Color/Line Status Pink 2/9/2018  8:01 AM                         Readmission Risk Assessment Tool Score Low Risk            4       Total Score        4 Pt. Coverage (Medicare=5 , Medicaid, or Self-Pay=4)        Criteria that do not apply:    Has Seen PCP in Last 6 Months (Yes=3, No=0)    . Living with Significant Other. Assisted Living. LTAC. SNF.  or   Rehab    Patient Length of Stay (>5 days = 3)    IP Visits Last 12 Months (1-3=4, 4=9, >4=11)    Charlson Comorbidity Score (Age + Comorbid Conditions)       Expected Length of Stay 2d 2h   Actual Length of Stay 1

## 2018-02-09 NOTE — PROGRESS NOTES
Occupational Therapy EVALUATION/discharge  Patient: Esperanza Montesinos (35 y.o. female)  Date: 2/9/2018  Primary Diagnosis: HTN (hypertension), malignant        Precautions: falls       ASSESSMENT:   Based on the objective data described below, the patient presents at her independent baseline for ADLs and functional mobility. Balance was intact for standing ADLs and mobility. No significant dizziness t/o all activity, but subtle feeling of it coming on with an undescribable  sensation occurring at the bridge of her nose and forehead. At this point skilled acute occupational therapy is not indicated at this time, but she will benefit from follow up with ENT and neurologist.   Discharge Recommendations: ENT and neurologist            OBJECTIVE DATA SUMMARY:   HISTORY:   Past Medical History:   Diagnosis Date    HTN (hypertension)     Vertigo    History reviewed. No pertinent surgical history. Prior Level of Function/Environment/Context: Independent    Expanded or extensive additional review of patient history:     Home Situation  Home Environment: Private residence  # Steps to Enter: 2  Rails to Enter: No  Wheelchair Ramp: No  One/Two Story Residence: Two story  # of Interior Steps: 15  Interior Rails: Right  Lift Chair Available: No  Living Alone: No  Support Systems: Friends \ neighbors, Other (comments) (family)  Patient Expects to be Discharged to[de-identified] Private residence  Current DME Used/Available at Home: None  Tub or Shower Type: Shower (both options )  [x]  Right hand dominant   []  Left hand dominant    EXAMINATION OF PERFORMANCE DEFICITS:  Cognitive/Behavioral Status:  Neurologic State: Alert  Orientation Level: Oriented X4  Cognition: Appropriate decision making; Appropriate for age attention/concentration; Appropriate safety awareness; Follows commands  Perception: Appears intact  Perseveration: No perseveration noted  Safety/Judgement: Awareness of environment    Hearing:   Auditory  Auditory Impairment: None    Vision/Perceptual:    Tracking: Able to track stimulus in all quadrants w/o difficulty    Saccades: Within Defined Limits    Convergence: Normal (within 6 inches from nose)    Visual Fields:  (WNL )  Diplopia: No    Acuity: Within Defined Limits    Corrective Lenses: Reading glasses    Range of Motion:  AROM: Within functional limits                         Strength:  Strength: Within functional limits                Coordination:  Coordination: Within functional limits  Fine Motor Skills-Upper: Left Intact; Right Intact    Gross Motor Skills-Upper: Left Intact; Right Intact    Tone & Sensation:  Tone: Normal  Sensation: Intact                      Balance:  Sitting: Intact  Standing: Intact    Functional Mobility and Transfers for ADLs:  Bed Mobility:  Rolling: Independent  Supine to Sit: Independent  Sit to Supine: Independent  Scooting: Independent    Transfers:  Sit to Stand: Independent  Stand to Sit: Independent  Bed to Chair: Independent  Toilet Transfer : Independent    ADL Assessment:  Feeding: Independent    Oral Facial Hygiene/Grooming: Independent    Bathing: Independent    Upper Body Dressing: Independent    Lower Body Dressing: Independent    Toileting: Independent              Functional Measure:  Barthel Index:    Bathin  Bladder: 10  Bowels: 10  Groomin  Dressing: 10  Feeding: 10  Mobility: 15  Stairs: 10  Toilet Use: 10  Transfer (Bed to Chair and Back): 15  Total: 100       Barthel and G-code impairment scale:  Percentage of impairment CH  0% CI  1-19% CJ  20-39% CK  40-59% CL  60-79% CM  80-99% CN  100%   Barthel Score 0-100 100 99-80 79-60 59-40 20-39 1-19   0   Barthel Score 0-20 20 17-19 13-16 9-12 5-8 1-4 0      The Barthel ADL Index: Guidelines  1. The index should be used as a record of what a patient does, not as a record of what a patient could do.   2. The main aim is to establish degree of independence from any help, physical or verbal, however minor and for whatever reason. 3. The need for supervision renders the patient not independent. 4. A patient's performance should be established using the best available evidence. Asking the patient, friends/relatives and nurses are the usual sources, but direct observation and common sense are also important. However direct testing is not needed. 5. Usually the patient's performance over the preceding 24-48 hours is important, but occasionally longer periods will be relevant. 6. Middle categories imply that the patient supplies over 50 per cent of the effort. 7. Use of aids to be independent is allowed. Lucy Jones., Barthel, DABRAM. (7507). Functional evaluation: the Barthel Index. [address] [facility] (14)2. Contreras Hernandez delores GARRET Barbosa, Geoffrey Lainez., Mamadou Monk., [place], [address] [facility] (1999). Measuring the change indisability after inpatient rehabilitation; comparison of the responsiveness of the Barthel Index and Functional [place] Measure. Journal of Neurology, Neurosurgery, and Psychiatry, 66(4), 271-071. Dylon Sanchez, N.J.A, TREY Paez, & Danilo King MPIERO. (2004.) Assessment of post-stroke quality of life in cost-effectiveness studies: The usefulness of the Barthel Index and the EuroQoL-5D. Quality of Life Research, 13, 879-55       G codes: In compliance with CMSs Claims Based Outcome Reporting, the following G-code set was chosen for this patient based on their primary functional limitation being treated: The outcome measure chosen to determine the severity of the functional limitation was the Barthel index with a score of 100/100 which was correlated with the impairment scale. ?  Self Care:     - CURRENT STATUS: CH - 0% impaired, limited or restricted    - GOAL STATUS: CH - 0% impaired, limited or restricted    - D/C STATUS:  CH - 0% impaired, limited or restricted   Pain:  Pain Scale 1: Numeric (0 - 10)  Pain Intensity 1: 0              Activity Tolerance:   VSS and patient without significant dizziness during functional activity  Please refer to the flowsheet for vital signs taken during this treatment. After treatment:   [x]  Patient left in no apparent distress sitting up in chair  []  Patient left in no apparent distress in bed  [x]  Call bell left within reach  [x]  Nursing notified  []  Caregiver present  []  Bed alarm activated    COMMUNICATION/EDUCATION:   Communication/Collaboration:  [x]      Home safety education was provided and the patient/caregiver indicated understanding. [x]      Patient/family have participated as able and agree with findings and recommendations. []      Patient is unable to participate in plan of care at this time.       Doroteo Sevilla, OTR/L  Time Calculation: 10 mins

## 2018-02-09 NOTE — PROGRESS NOTES
TRANSFER - IN REPORT:    Verbal report received from Rosy Holland, [facility] (name) on Deion Osborn  being received from ED(unit) for routine progression of care      Report consisted of patients Situation, Background, Assessment and   Recommendations(SBAR). Information from the following report(s) SBAR was reviewed with the receiving nurse. Opportunity for questions and clarification was provided. Assessment completed upon patients arrival to unit and care assumed. Bedside shift change report given to Boogie Brannon (oncoming nurse) by Johanny Tran RN (offgoing nurse). Report included the following information SBAR. SHIFT SUMMARY:            [address]   Admission Date 2/8/2018   Admission Diagnosis HTN (hypertension), malignant   Consults IP CONSULT TO HOSPITALIST      Cardiac Monitoring [x] Yes [] No      Purposeful Hourly Rounding [x] Yes    David Score Total Score: 0   David score 3 or > [x] Bed Alarm [] Avasys [] 1:1 sitter [] Patient refused (Place signed refusal form in chart)   Teo Score Teo Score: 23   Teo score 14 or < [] PMT consult [] Wound Care consult    []  Specialty bed  [] Nutrition consult      Influenza Vaccine Received Flu Vaccine for Current Season (usually Sept-March): No    Patient/Guardian Refused (Notify MD): No      Oxygen needs?  [x] Room air Oxygen @  []1L    []2L    []3L   []4L    []5L   []6L     Use home O2? [] Yes [x] No  Perform O2 challenge test using  smartphrase (.Homeoxygen)      Last bowel movement        Urinary Catheter             LDAs               Peripheral IV 02/08/18 Right Antecubital (Active)   Site Assessment Clean, dry, & intact 2/8/2018  5:50 PM   Phlebitis Assessment 0 2/8/2018  5:50 PM   Infiltration Assessment 0 2/8/2018  5:50 PM   Dressing Status Clean, dry, & intact 2/8/2018  5:50 PM   Dressing Type Transparent 2/8/2018  5:50 PM   Hub Color/Line Status Pink 2/8/2018  5:50 PM                         Readmission Risk Assessment Tool Score Low Risk 4       Total Score        4 Pt. Coverage (Medicare=5 , Medicaid, or Self-Pay=4)        Criteria that do not apply:    Has Seen PCP in Last 6 Months (Yes=3, No=0)    . Living with Significant Other. Assisted Living. LTAC. SNF.  or   Rehab    Patient Length of Stay (>5 days = 3)    IP Visits Last 12 Months (1-3=4, 4=9, >4=11)    Charlson Comorbidity Score (Age + Comorbid Conditions)       Expected Length of Stay - - -   Actual Length of Stay 0

## 2018-02-09 NOTE — PROGRESS NOTES
Hospitalist Progress Note    NAME: Jaron Bianchi   :  1965   MRN:  [id number]       Assessment / Plan: Acclerated HTN, resolved  -Hx of HTN and was on medications at one point. Pt states she was taken off meds in . Had not seen a doctor since then  -troponin neg, EKG with NSR.  R axis deviation. -head CT neg   -BP now under better control - continue Coreg, Hydralazine  -Discontinued Norvasc pt's BP is too tightly controlled  -hydralazine prn  -Echo pending     BPV vs CVA - improving  -pt with hx of vertigo in the past.  She presented here with severe HA, dizziness, nausea and vomiting. No other neurological symptoms. -head CT negative  -MRI ordered to rule out posterior circulation cva upon admission - no evidence of CVA  -TSH wnl  - - started on Lipitor  -A1c  5.8  -scheduled meclizine, antiemetics/xzofran prn  -PT/OT - appreciated  -Will provide ENT referral upon discharge     Morbid Obesity   Body mass index is 47.77 kg/(m^2). Code status: Full  Prophylaxis: Lovenox  Recommended Disposition: Home w/Family     Subjective:     Chief Complaint / Reason for Physician Visit  \"I am feeling better -still dizzy\". Discussed with RN events overnight. Review of Systems:  Symptom Y/N Comments  Symptom Y/N Comments   Fever/Chills n   Chest Pain n    Poor Appetite n   Edema     Cough n   Abdominal Pain n    Sputum    Joint Pain     SOB/SANCHEZ n   Pruritis/Rash     Nausea/vomit    Tolerating PT/OT y    Diarrhea    Tolerating Diet y    Constipation    Other       Could NOT obtain due to:      Objective:     VITALS:   Last 24hrs VS reviewed since prior progress note.  Most recent are:  Patient Vitals for the past 24 hrs:   Temp Pulse Resp BP SpO2   18 1441 98.4 °F (36.9 °C) 76 20 107/59 95 %   18 1158 98.7 °F (37.1 °C) 64 20 101/56 97 %   18 0801 99.1 °F (37.3 °C) 74 20 120/73 94 %   18 0242 99.3 °F (37.4 °C) 77 18 125/72 95 %   18 2340 - - - (!) 171/91 -   18 8944 99.3 °F (37.4 °C) 77 18 185/88 99 %   02/08/18 2157 - - - (!) 185/93 -   02/08/18 1950 - - - (!) 183/105 -   02/08/18 1943 99 °F (37.2 °C) 84 18 (!) 201/109 100 %   02/08/18 1743 98.8 °F (37.1 °C) 87 18 (!) 167/100 98 %       Intake/Output Summary (Last 24 hours) at 02/09/18 1654  Last data filed at 02/08/18 1943   Gross per 24 hour   Intake               50 ml   Output              300 ml   Net             -250 ml        PHYSICAL EXAM:  General: WD, WN. Alert, cooperative, no acute distress    EENT:  EOMI. Anicteric sclerae. MMM  Resp:  CTA bilaterally, no wheezing or rales. No accessory muscle use  CV:  Regular  rhythm,  No edema  GI:  Soft, Non distended, Non tender.  +Bowel sounds  Neurologic:  Alert and oriented X 3, normal speech   Psych:   Good insight. Not anxious nor agitated  Skin:  No rashes. No jaundice    Reviewed most current lab test results and cultures  YES  Reviewed most current radiology test results   YES  Review and summation of old records today    NO  Reviewed patient's current orders and MAR    YES  PMH/ reviewed - no change compared to H&P  ________________________________________________________________________  Care Plan discussed with:    Comments   Patient x    Family      RN     Care Manager     Consultant                        Multidiciplinary team rounds were held today with , nursing, pharmacist and clinical coordinator. Patient's plan of care was discussed; medications were reviewed and discharge planning was addressed.      ________________________________________________________________________  Total NON critical care TIME:  25   Minutes    Total CRITICAL CARE TIME Spent:   Minutes non procedure based      Comments   >50% of visit spent in counseling and coordination of care     ________________________________________________________________________  Daljit De La Paz MD     Procedures: see electronic medical records for all procedures/Xrays and details which were not copied into this note but were reviewed prior to creation of Plan. LABS:  I reviewed today's most current labs and imaging studies.   Pertinent labs include:  Recent Labs      02/09/18 0047 02/08/18 0945   WBC  10.8  9.3   HGB  12.6  12.5   HCT  41.2  40.0   PLT  365  351     Recent Labs      02/09/18   0903  02/09/18 0047 02/08/18 0945   NA   --   136  139   K   --   2.9*  3.6   CL   --   102  105   CO2   --   26  28   GLU   --   104*  119*   BUN   --   9  10   CREA   --   0.75  0.77   CA   --   9.0  8.7   MG  2.5*   --    --    ALB   --    --   3.5   TBILI   --    --   0.4   SGOT   --    --   26   ALT   --    --   14       Signed: Jeffrey Peck MD

## 2018-02-09 NOTE — PROGRESS NOTES
physical Therapy EVALUATION/DISCHARGE  Patient: Trenton Grayson (6 y.o. female)  Date: 2/9/2018  Primary Diagnosis: HTN (hypertension), malignant        Precautions:     ASSESSMENT :  Based on the objective data described below, the patient presents with ? Vestibular disorder, nausea (no vomiting), R forehead/nasal 'fullness' and 'headache'. Noted patient to have utilized meclizine, antiemetics which may have impacted today's testing. Patient with inconsistent c/o 'dizziness' which when attempted to establish vertigo vs lightheadedness patient poor at clarifying throughout session. Nevertheless, vestibular testing performed with negative head thrust, head shake, smooth pursuit, saccades, roll test. During ag-phelps pike test, and both L and R side-lying, patient with bidirectional non-latent horizontal nystagmus that did not fatigue. During nystagmus, she was vague in subjective symptoms, though largely reported R forehead/nasal HA. All balance, functional testing WNL without dysequilibrium and orthostatic testing negative. Given nature of nystagmus typically indicative of central dysfunction and noted negative MRI/CT workup, she may be deserving of additional workup in the OP ENT/neurology/neurotology environment. Patient advised to remain OOB as tolerated, mobilize in halls (can be up ad phillip - RN notified) throughout remainder of admission. Patient also advised to f/u on a regular basis ahead with MD and pursue formal routine exercise programming available (reports can go to free classes in her community) for generalized health and weight loss. Skilled physical therapy is not indicated at this time. PLAN :  Discharge Recommendations: ENT/neurologist/neurotologist  Further Equipment Recommendations for Discharge: none     SUBJECTIVE:   Patient stated It's the sinusitis.     OBJECTIVE DATA SUMMARY:   HISTORY:    Past Medical History:   Diagnosis Date    HTN (hypertension)     Vertigo    History reviewed.  No pertinent surgical history. Prior Level of Function/Home Situation: - trauma, - tinnitus, - hearing loss, + recent \"ear infection\" (reports diagnosed in the ED this visit, and L ear??), + '20 year history' of 'sinusitis' recurrent every few months within recent onset of symptoms this past Thursday, - high sodium diet, - family history of vertiginous disorders, + past episodic (resolved within a few days) vertigo x2 in the past ten years. No falls, driving, working (as a PCA for 'a lady'), living currently at a community home as she is between apartments. She endorses not f/u with a MD for years. Personal factors and/or comorbidities impacting plan of care: morbid obesity, no f/u with MD in years     Home Situation  Home Environment: Private residence  # Steps to Enter: 2  Rails to Enter: No  Wheelchair Ramp: No  One/Two Story Residence: Two story  # of Interior Steps: 14  Interior Rails: Right  Lift Chair Available: No  Living Alone: No  Support Systems: Friends \ neighbors, Other (comments) (family)  Patient Expects to be Discharged to[de-identified] Private residence  Current DME Used/Available at Home: None  Tub or Shower Type: Shower (both options )    EXAMINATION/PRESENTATION/DECISION MAKING:   Critical Behavior:  Neurologic State: Alert  Orientation Level: Oriented X4  Cognition: Appropriate decision making  Safety/Judgement: Awareness of environment  Hearing: Auditory  Auditory Impairment: None  Range Of Motion:  AROM: Within functional limits                       Strength:    Strength:  Within functional limits                    Tone & Sensation:   Tone: Normal              Sensation: Intact               Coordination:  Coordination: Within functional limits  Vision:   Tracking: Able to track stimulus in all quadrants w/o difficulty  Saccades: Within Defined Limits  Convergence: Normal (within 6 inches from nose)  Visual Fields:  (WNL )  Diplopia: No  Acuity: Within Defined Limits  Corrective Lenses: Reading glasses  Functional Mobility:  Bed Mobility:  Rolling: Independent  Supine to Sit: Independent     Scooting: Independent  Transfers:  Sit to Stand: Independent  Stand to Sit: Independent                       Balance:   Sitting: Intact; Without support  Standing: Intact; Without support  Ambulation/Gait Training:  Distance (ft): 310 Feet (ft)  Assistive Device: Gait belt  Ambulation - Level of Assistance: Independent     Gait Description (WDL): Exceptions to [facility]           Base of Support: Widened (2/2 body habitus )                               No concerns    Stairs:  Number of Stairs Trained: 15  Stairs - Level of Assistance: Independent   Rail Use: Right  (step through)     Vertigo Testing:  Provoking positions:   Yes No Comments   Right sidelying X  + 'fullness' in R forehead, + L horizontal nystagmus   Left sidelying X  + 'fullness' in R forehead, + R horizontal nystagmus   Cervical flexion  X    Cervical extension  x    Right cervical rotation  x    Left cervical rotation  x            Marianne-Hallpike: Test reproducing bidirectional non-latency non-fatiguing horizontal nystagmus with L and R side lying and cervical rotation. Roll test negative.      Functional Measure:  Leong Balance Test:    Sitting to Standin  Standing Unsupported: 4  Sitting with Back Unsupported: 4  Standing to Sittin  Transfers: 4  Standing Unsupported with Eyes Closed: 4  Standing Unsupported with Feet Together: 4  Reach Forward with Outstretched Arm: 4   Object: 4  Turn to Look Over Shoulders: 4  Turn 360 Degrees: 4  Alternate Foot on Step/Stool: 4  Standing Unsupported One Foot in Front: 2  Stand on One Le  Total: 54         56=Maximum possible score;   0-20=High fall risk  21-40=Moderate fall risk   41-56=Low fall risk     Leong Balance Test and G-code impairment scale:  Percentage of Impairment CH    0%   CI    1-19% CJ    20-39% CK    40-59% CL    60-79% CM    80-99% CN     100%   Leong   Score 0-56 56 45-55 34-44 23-33 12-22 1-11 0     Functional Gait Assessment:    Gait Level Surface: Mild impairment  Change In Gait Speed: Normal  Gait With Horizontal Head Turns: Normal  Gait With Vertical Head Turns: Normal  Gait And Pivot Turn: Normal  Step Over Obstacle: Normal  Gait With Narrow Base Of Support: Mild impairment  Gait With Eyes Closed: Normal  Ambulating Backwards: Normal  Steps: Mild impairment  Score: 27     Functional Gait Assessment and G-code impairment scale:  Percentage of Impairment CH    0%   CI    1-19% CJ    20-39% CK    40-59% CL    60-79% CM    80-99% CN     100%   Functional Gait  Score 0-30 30 25-29 19-24 13-18 7-12 1-6 0       Maximum Score 30   £ 22/30 classifies fall risk in older adults and predicts unexplained falls in community-dwelling older adults  Age: \"Normal\" score:   Up to 60 >27/30   60-80 >24/30   Over [de-identified] >19/30   Perley Moritz, N. \"Functional Gait Assessment: concurrent discriminative, and predictive validity in community-dwelling older adults. \" Physical Therapy 90 (5) 2010. G codes: In compliance with CMSs Claims Based Outcome Reporting, the following G-code set was chosen for this patient based on their primary functional limitation being treated: The outcome measure chosen to determine the severity of the functional limitation was the vargas with a score of 54/56 which was correlated with the impairment scale. ? Mobility - Walking and Moving Around:     - CURRENT STATUS: CI - 1%-19% impaired, limited or restricted    - GOAL STATUS: CI - 1%-19% impaired, limited or restricted    - D/C STATUS:  CI - 1%-19% impaired, limited or restricted      Pain:  Pain Scale 1: Numeric (0 - 10)  Pain Intensity 1: 0     Activity Tolerance:       Please refer to the flowsheet for vital signs taken during this treatment.   After treatment:   [x]   Patient left in no apparent distress sitting up in chair  []   Patient left in no apparent distress in bed  [x]   Call bell left within reach  [x]   Nursing notified  []   Caregiver present  []   Bed alarm activated    COMMUNICATION/EDUCATION:   Communication/Collaboration:  [x]   Fall prevention education was provided and the patient/caregiver indicated understanding. [x]   Patient/family have participated as able and agree with findings and recommendations. []   Patient is unable to participate in plan of care at this time.   Findings and recommendations were discussed with: Registered Nurse    Thank you for this referral.  Cam Lorenzo, PT, DPT, CEEAA      Time Calculation: 70 mins

## 2018-02-10 VITALS
DIASTOLIC BLOOD PRESSURE: 64 MMHG | SYSTOLIC BLOOD PRESSURE: 118 MMHG | RESPIRATION RATE: 18 BRPM | OXYGEN SATURATION: 96 % | TEMPERATURE: 98.5 F | HEIGHT: 68 IN | HEART RATE: 62 BPM | BODY MASS INDEX: 44.41 KG/M2 | WEIGHT: 293 LBS

## 2018-02-10 LAB
ANION GAP SERPL CALC-SCNC: 5 MMOL/L (ref 5–15)
BASOPHILS # BLD: 0.1 K/UL (ref 0–0.1)
BASOPHILS NFR BLD: 1 % (ref 0–1)
BUN SERPL-MCNC: 22 MG/DL (ref 6–20)
BUN/CREAT SERPL: 19 (ref 12–20)
CALCIUM SERPL-MCNC: 8.3 MG/DL (ref 8.5–10.1)
CHLORIDE SERPL-SCNC: 105 MMOL/L (ref 97–108)
CO2 SERPL-SCNC: 28 MMOL/L (ref 21–32)
CREAT SERPL-MCNC: 1.17 MG/DL (ref 0.55–1.02)
DIFFERENTIAL METHOD BLD: ABNORMAL
EOSINOPHIL # BLD: 0.1 K/UL (ref 0–0.4)
EOSINOPHIL NFR BLD: 1 % (ref 0–7)
ERYTHROCYTE [DISTWIDTH] IN BLOOD BY AUTOMATED COUNT: 14.4 % (ref 11.5–14.5)
GLUCOSE SERPL-MCNC: 98 MG/DL (ref 65–100)
HCT VFR BLD AUTO: 34.5 % (ref 35–47)
HGB BLD-MCNC: 10.7 G/DL (ref 11.5–16)
IMM GRANULOCYTES # BLD: 0 K/UL (ref 0–0.04)
IMM GRANULOCYTES NFR BLD AUTO: 0 % (ref 0–0.5)
LYMPHOCYTES # BLD: 2.6 K/UL (ref 0.8–3.5)
LYMPHOCYTES NFR BLD: 32 % (ref 12–49)
MCH RBC QN AUTO: 27 PG (ref 26–34)
MCHC RBC AUTO-ENTMCNC: 31 G/DL (ref 30–36.5)
MCV RBC AUTO: 86.9 FL (ref 80–99)
MONOCYTES # BLD: 0.8 K/UL (ref 0–1)
MONOCYTES NFR BLD: 10 % (ref 5–13)
NEUTS SEG # BLD: 4.7 K/UL (ref 1.8–8)
NEUTS SEG NFR BLD: 56 % (ref 32–75)
NRBC # BLD: 0 K/UL (ref 0–0.01)
NRBC BLD-RTO: 0 PER 100 WBC
PLATELET # BLD AUTO: 332 K/UL (ref 150–400)
PMV BLD AUTO: 9 FL (ref 8.9–12.9)
POTASSIUM SERPL-SCNC: 3.3 MMOL/L (ref 3.5–5.1)
RBC # BLD AUTO: 3.97 M/UL (ref 3.8–5.2)
SODIUM SERPL-SCNC: 138 MMOL/L (ref 136–145)
WBC # BLD AUTO: 8.3 K/UL (ref 3.6–11)

## 2018-02-10 PROCEDURE — 36415 COLL VENOUS BLD VENIPUNCTURE: CPT | Performed by: GENERAL ACUTE CARE HOSPITAL

## 2018-02-10 PROCEDURE — 85025 COMPLETE CBC W/AUTO DIFF WBC: CPT | Performed by: GENERAL ACUTE CARE HOSPITAL

## 2018-02-10 PROCEDURE — 74011250636 HC RX REV CODE- 250/636: Performed by: GENERAL ACUTE CARE HOSPITAL

## 2018-02-10 PROCEDURE — 74011250637 HC RX REV CODE- 250/637: Performed by: INTERNAL MEDICINE

## 2018-02-10 PROCEDURE — 80048 BASIC METABOLIC PNL TOTAL CA: CPT | Performed by: GENERAL ACUTE CARE HOSPITAL

## 2018-02-10 PROCEDURE — 74011250636 HC RX REV CODE- 250/636: Performed by: INTERNAL MEDICINE

## 2018-02-10 RX ORDER — ATORVASTATIN CALCIUM 40 MG/1
40 TABLET, FILM COATED ORAL
Qty: 30 TAB | Refills: 0 | Status: SHIPPED | OUTPATIENT
Start: 2018-02-10 | End: 2019-05-08

## 2018-02-10 RX ORDER — HYDROCHLOROTHIAZIDE 25 MG/1
25 TABLET ORAL DAILY
Qty: 30 TAB | Refills: 0 | Status: SHIPPED | OUTPATIENT
Start: 2018-02-11 | End: 2019-05-08

## 2018-02-10 RX ORDER — CARVEDILOL 12.5 MG/1
12.5 TABLET ORAL 2 TIMES DAILY WITH MEALS
Qty: 60 TAB | Refills: 0 | Status: SHIPPED | OUTPATIENT
Start: 2018-02-10 | End: 2019-05-08

## 2018-02-10 RX ORDER — MECLIZINE HYDROCHLORIDE 25 MG/1
25 TABLET ORAL
Qty: 40 TAB | Refills: 0 | Status: SHIPPED | OUTPATIENT
Start: 2018-02-10 | End: 2018-02-20

## 2018-02-10 RX ADMIN — ENOXAPARIN SODIUM 40 MG: 40 INJECTION SUBCUTANEOUS at 02:03

## 2018-02-10 RX ADMIN — ASPIRIN 81 MG: 81 TABLET, COATED ORAL at 08:25

## 2018-02-10 RX ADMIN — Medication 10 ML: at 05:34

## 2018-02-10 RX ADMIN — HYDROCHLOROTHIAZIDE 25 MG: 25 TABLET ORAL at 08:25

## 2018-02-10 RX ADMIN — MECLIZINE 25 MG: 12.5 TABLET ORAL at 05:34

## 2018-02-10 RX ADMIN — MECLIZINE 25 MG: 12.5 TABLET ORAL at 12:03

## 2018-02-10 NOTE — PROGRESS NOTES
Bedside shift change report given to Brigid Osborn (oncoming nurse) by Shaye Brito RN (offgoing nurse). Report included the following information SBAR.

## 2018-02-10 NOTE — DISCHARGE INSTRUCTIONS
Epley Maneuver for Vertigo: Exercises  Your Care Instructions  The Epley Maneuver is a series of movements your doctor may use to treat your vertigo. Here are the steps for the exercises. Your doctor or physical therapist will guide you through the movements. A single 10- to 15-minute session often is all that's needed. Crystal debris (canaliths) cause the vertigo. When your head is moved into different positions, the debris moves freely. This may cause your symptoms to stop. How to do the exercises  Step 1    1. You will sit on the doctor's exam table. Your legs will be out in front of you. The doctor or physical therapist will turn your head so that it is FCI between looking straight ahead and looking to the side that causes the worst vertigo. 2. Without changing your head position, he or she will guide you back quickly. Your shoulders will be on the table. Your head will hang over the edge of the table. At this point, the side of your head that is causing the worst vertigo will face the floor. You'll stay in this position for 30 seconds or until your symptoms stop. Step 2    1. Then, the doctor or physical therapist will turn your head to the other side. You don't need to lift your head. The other side of your head will face the floor. You will stay in this position for 30 seconds or until your symptoms stop. Step 3    1. The doctor or physical therapist will help you roll your body in the same direction that your head is facing. You will lie on your side. (For example, if you are looking to your right, you will roll onto your right side.) The side that causes the worst symptoms should be facing up. You'll stay in this position for another 30 seconds or until your symptoms stop. Step 4    1. The doctor or physical therapist will then help you to sit back up. Your legs will hang off the table on the same side that you were facing. Follow-up care is a key part of your treatment and safety.  Be sure to make and go to all appointments, and call your doctor if you are having problems. It's also a good idea to know your test results and keep a list of the medicines you take. Where can you learn more? Go to http://kelsey-swapna.info/. Enter R177 in the search box to learn more about \"Epley Maneuver for Vertigo: Exercises. \"  Current as of: May 12, 2017  Content Version: 11.4  © 6094-2169 [employer]. Care instructions adapted under license by [employer] (which disclaims liability or warranty for this information). If you have questions about a medical condition or this instruction, always ask your healthcare professional. [address] any warranty or liability for your use of this information.

## 2018-02-10 NOTE — DISCHARGE SUMMARY
Hospitalist Discharge Summary     Patient ID:  Ling Brunson  [id number]  45 y.o.  1965    PCP on record: PROVIDER UNKNOWN    Admit date: 2/8/2018  Discharge date and time: 2/10/2018      DISCHARGE DIAGNOSIS:  Accelerated HTN, resolved  BPPV vs Vertigo  Dyslipidemia   Morbid Obesity     CONSULTATIONS:  IP CONSULT TO HOSPITALIST    Excerpted HPI from H&P of Deana Baldwin MD:  Ling Brunson is a 45 y.o.  female with pmhx significant for hx of vertigo, HTN, no longer on medications since 2010, morbid obesity, present to ED c/o sudden onset of dizziness associated with headache, nausea and vomiting. Symptoms started around last night and worst this morning when she was getting off work ~5AM  Pt states her dizziness is worst with positional changes. She took ASA 81mg x2 last night, however STEWARD persisted. Pt is currently not on any medications and have not seen a doctor since 2010. She denies any associated fever, chills, cp, sob, palpitations. No changes in vision or other neurological deficits.    ______________________________________________________________________  DISCHARGE SUMMARY/HOSPITAL COURSE:  for full details see H&P, daily progress notes, labs, consult notes. Acclerated HTN, resolved  -Hx of HTN and was on medications at one point.  Pt states she was taken off meds in 2010.  Had not seen a doctor since then  -troponin neg, EKG with NSR.  R axis deviation. -head CT neg   -BP now under better control - continue Coreg, Hydralazine  -Discontinued Norvasc pt's BP is too tightly controlled  -Echo wnl       BPPV vs Vertigo - mostly resolved, CVA ruled out  Dyslipidemia   -pt with hx of vertigo in the past.  She presented here with severe HA, dizziness, nausea and vomiting.  No other neurological symptoms.   -head CT negative  -MRI ordered to rule out posterior circulation cva upon admission - no evidence of CVA  -TSH wnl  - - started on Lipitor  -A1c  5.8  -Meclizine PRN  -PT/OT - appreciated  -Will provide ENT referral upon discharge  -Pseudofed for sinus congestion - this is her only complaint now. She is ambulating well without any dizziness, confusion, instability, or double vision.      Pt stable for discharge back home. Pt informed of the plan and agrees. Morbid Obesity   Body mass index is 47.77 kg/(m^2)  _______________________________________________________________________  Patient seen and examined by me on discharge day. Pertinent Findings:  Gen:    Not in distress  Chest: Clear lungs  CVS:   Regular rhythm. No edema  Abd:  Soft, not distended, not tender  Neuro:  Alert, oriented x3, no focal deficits, ambulating without difficulty  _______________________________________________________________________  DISCHARGE MEDICATIONS:   Current Discharge Medication List      START taking these medications    Details   atorvastatin (LIPITOR) 40 mg tablet Take 1 Tab by mouth nightly. Qty: 30 Tab, Refills: 0      carvedilol (COREG) 12.5 mg tablet Take 1 Tab by mouth two (2) times daily (with meals). Qty: 60 Tab, Refills: 0      hydroCHLOROthiazide (HYDRODIURIL) 25 mg tablet Take 1 Tab by mouth daily. Qty: 30 Tab, Refills: 0      meclizine (ANTIVERT) 25 mg tablet Take 1 Tab by mouth three (3) times daily as needed for up to 10 days. Qty: 40 Tab, Refills: 0         CONTINUE these medications which have NOT CHANGED    Details   aspirin delayed-release 81 mg tablet Take 81 mg by mouth daily. My Recommended Diet, Activity, Wound Care, and follow-up labs are listed in the patient's Discharge Insturctions which I have personally completed and reviewed.     ______________________________________________________________________    Risk of deterioration: Low    Condition at Discharge:  Stable  ______________________________________________________________________    Disposition  Home with family.   ______________________________________________________________________    Care Plan discussed with:   Patient, Family, RN    ______________________________________________________________________    Code Status: Full Code  ______________________________________________________________________      Follow up with:   PCP : PROVIDER UNKNOWN  Follow-up Information     Follow up With Details Comments Contact Info    Provider Unknown   Patient not available to ask      [facility] In 2 weeks  [address] [facility] [address] [facility] [address]                Total time in minutes spent coordinating this discharge (includes going over instructions, follow-up, prescriptions, and preparing report for sign off to her PCP) :  35 minutes    Signed:  Debbie Bullock MD

## 2018-02-10 NOTE — PROGRESS NOTES
[address] February 10, 2018       RE: Renuka Hall      To Whom It May Concern,    This is to certify that Renuka Hall may return to her regular activities on 2/13/18. Please feel free to contact my office if you have any questions or concerns. Thank you for your assistance in this matter.       Sincerely,  Elizabeth Lynch MD

## 2018-02-10 NOTE — PROGRESS NOTES
Problem: Falls - Risk of  Goal: *Absence of Falls  Document David Fall Risk and appropriate interventions in the flowsheet.    Outcome: Progressing Towards Goal  Fall Risk Interventions:            Medication Interventions: Bed/chair exit alarm, Patient to call before getting OOB, Teach patient to arise slowly

## 2019-05-08 ENCOUNTER — HOSPITAL ENCOUNTER (EMERGENCY)
Age: 54
Discharge: HOME | End: 2019-05-08
Attending: EMERGENCY MEDICINE
Payer: COMMERCIAL

## 2019-05-08 ENCOUNTER — APPOINTMENT (OUTPATIENT)
Dept: GENERAL RADIOLOGY | Age: 54
End: 2019-05-08
Attending: EMERGENCY MEDICINE
Payer: COMMERCIAL

## 2019-05-08 VITALS
TEMPERATURE: 98.9 F | SYSTOLIC BLOOD PRESSURE: 164 MMHG | OXYGEN SATURATION: 96 % | RESPIRATION RATE: 16 BRPM | HEART RATE: 72 BPM | DIASTOLIC BLOOD PRESSURE: 114 MMHG

## 2019-05-08 DIAGNOSIS — J30.1 SEASONAL ALLERGIC RHINITIS DUE TO POLLEN: ICD-10-CM

## 2019-05-08 DIAGNOSIS — J01.10 ACUTE NON-RECURRENT FRONTAL SINUSITIS: Primary | ICD-10-CM

## 2019-05-08 PROCEDURE — 71046 X-RAY EXAM CHEST 2 VIEWS: CPT

## 2019-05-08 PROCEDURE — 99282 EMERGENCY DEPT VISIT SF MDM: CPT

## 2019-05-08 RX ORDER — CETIRIZINE HCL 10 MG
10 TABLET ORAL DAILY
Qty: 30 TAB | Refills: 0 | Status: ON HOLD | OUTPATIENT
Start: 2019-05-08 | End: 2021-09-13

## 2019-05-08 RX ORDER — BENZOCAINE .13; .15; .5; 2 G/100G; G/100G; G/100G; G/100G
2 GEL ORAL DAILY
Qty: 1 BOTTLE | Refills: 0 | Status: ON HOLD | OUTPATIENT
Start: 2019-05-08 | End: 2021-09-13

## 2019-05-08 NOTE — ED PROVIDER NOTES
Initial Complaint: chest and nasal congestion,  
 
Started: 3 weeks ago. Seen at [place] [address] [facility] yesterday. Sent to [facility] when urgent care was unable to accept her insurance. Endorses: Started with sinus pain & pressure, deep cough with sputum production with bloody mucous in the last few minutes, sweats, chills for last 4 nights, abdominal and chest soreness due to cough. Mild SOB. Denies:Fevers, nausea, vomiting, known contact with anyone with TB, recent travel, sick contacts Tried mucinex first week, Theraflu 2nd week and dollar store cough medication this week. Made better: slightly better with cough medication Made worse:nothing No further complaints. Past Medical History: 
No date: HTN (hypertension) No date: Vertigo History reviewed. No pertinent surgical history. Reviewed Primary care provider: None The history is provided by the patient. No  was used. Past Medical History:  
Diagnosis Date  
 HTN (hypertension)  Vertigo History reviewed. No pertinent surgical history. Family History:  
Problem Relation Age of Onset  No Known Problems Mother  No Known Problems Father Social History Socioeconomic History  Marital status: SINGLE Spouse name: Not on file  Number of children: Not on file  Years of education: Not on file  Highest education level: Not on file Occupational History  Not on file Social Needs  Financial resource strain: Not on file  Food insecurity:  
  Worry: Not on file Inability: Not on file  Transportation needs:  
  Medical: Not on file Non-medical: Not on file Tobacco Use  Smoking status: Never Smoker  Smokeless tobacco: Never Used Substance and Sexual Activity  Alcohol use: Yes Comment: occasional  
 Drug use: No  
 Sexual activity: Not on file Lifestyle  Physical activity:  
  Days per week: Not on file Minutes per session: Not on file  Stress: Not on file Relationships  Social connections:  
  Talks on phone: Not on file Gets together: Not on file Attends [employer] service: Not on file Active member of club or organization: Not on file Attends meetings of clubs or organizations: Not on file Relationship status: Not on file  Intimate partner violence:  
  Fear of current or ex partner: Not on file Emotionally abused: Not on file Physically abused: Not on file Forced sexual activity: Not on file Other Topics Concern  Not on file Social History Narrative  Not on file ALLERGIES: Hydrocodone and Tylenol [acetaminophen] Review of Systems Constitutional: Positive for activity change, chills and diaphoresis. HENT: Positive for congestion, ear pain, postnasal drip, rhinorrhea, sinus pressure and sinus pain. Negative for ear discharge, hearing loss (stuffy ears), sneezing, sore throat, trouble swallowing and voice change. Dry mouth Respiratory: Positive for shortness of breath. Cardiovascular: Negative for chest pain. Gastrointestinal: Negative for constipation, diarrhea, nausea and vomiting. Genitourinary: Positive for difficulty urinating. Musculoskeletal: Negative. Psychiatric/Behavioral: Negative. All other systems reviewed and are negative. Vitals:  
 05/08/19 1027 05/08/19 1038 05/08/19 1202 05/08/19 1203 BP:  (!) 209/104 (!) 197/127 (!) 164/114 Pulse: 86 72 Resp:  16 Temp:  98.9 °F (37.2 °C) SpO2: 98% 96% Physical Exam  
Constitutional: She is oriented to person, place, and time. She appears well-developed and well-nourished. HENT:  
Head: Normocephalic and atraumatic. Right Ear: Hearing, external ear and ear canal normal. Tympanic membrane is retracted. Tympanic membrane is not injected.   
Left Ear: Hearing, external ear and ear canal normal. Tympanic membrane is bulging. Tympanic membrane is not injected. Nose: Mucosal edema present. Right sinus exhibits no maxillary sinus tenderness and no frontal sinus tenderness. Left sinus exhibits no maxillary sinus tenderness and no frontal sinus tenderness. Mouth/Throat: Uvula is midline and oropharynx is clear and moist. Mucous membranes are pale and dry. No tonsillar exudate. Neck: Normal range of motion. Neck supple. Cardiovascular: Normal rate, regular rhythm, normal heart sounds and intact distal pulses. Pulmonary/Chest: Effort normal and breath sounds normal. No respiratory distress. She has no wheezes. She has no rales. She exhibits no tenderness. Abdominal: Soft. Bowel sounds are normal. There is no tenderness. There is no guarding. Musculoskeletal: Normal range of motion. Lymphadenopathy:  
     Head (right side): No submental, no submandibular, no tonsillar and no preauricular adenopathy present. Head (left side): No submental, no submandibular, no tonsillar and no preauricular adenopathy present. She has no cervical adenopathy. Neurological: She is alert and oriented to person, place, and time. Skin: Skin is warm and dry. No erythema. Psychiatric: She has a normal mood and affect. Her behavior is normal. Judgment and thought content normal.  
Nursing note and vitals reviewed. MDM Procedures Assessment & Plan:  
 
Orders Placed This Encounter  XR CHEST PA LAT Discussed with Osvaldo Betancourt MD,ED Provider Juliet Teague NP 
05/08/19 
11:57 AM 
 
Imaging without acute findings. Follow-up with PCP. PCP & ENT referral Discussed return precautions. LABORATORY TESTS: 
Labs Reviewed - No data to display IMAGING RESULTS: 
Xr Chest Pa Lat Result Date: 5/8/2019 INDICATION:  cough COMPARISON: February 2018 FINDINGS: PA and lateral views of the chest demonstrate a stable cardiomediastinal silhouette and clear lungs bilaterally.  The visualized osseous structures are unremarkable. IMPRESSION: No acute process MEDICATIONS GIVEN: 
Medications - No data to display IMPRESSION: 
1. Acute non-recurrent frontal sinusitis 2. Seasonal allergic rhinitis due to pollen PLAN: 
1. Current Discharge Medication List  
  
START taking these medications Details  
budesonide (RHINOCORT AQUA) 32 mcg/actuation nasal spray 2 Sprays by Both Nostrils route daily. Qty: 1 Bottle, Refills: 0  
  
guaiFENesin (MUCINEX) 1,200 mg Ta12 ER tablet Take 1 Tab by mouth two (2) times a day for 15 days. Indications: cough Qty: 30 Tab, Refills: 0  
  
cetirizine (ZYRTEC) 10 mg tablet Take 1 Tab by mouth daily. Indications: inflammation of the nose due to an allergy 
Qty: 30 Tab, Refills: 0  
  
  
 
2. Follow-up Information Follow up With Specialties Details Why Contact Info [facility] Schedule an appointment as soon as possible for a visit primary care referral [address] [place] [id number] 
[phone] Serapio Rinne, MD Otolaryngology Schedule an appointment as soon as possible for a visit  [facility] and [address] [place] [id number] 
[phone] [facility] EMERGENCY DEP Emergency Medicine  As needed, If symptoms worsen [address] [place] [address] [phone] 3. Return to ED for new or worsening symptoms Lizett Whalen NP

## 2019-05-08 NOTE — ED NOTES
Patient given discharge instructions, prescriptions, and strict follow up information including PCP and ENT. Pt verbalized understanding. Pt ambulatory out of ER with steady gait without assistance.

## 2019-05-08 NOTE — ED NOTES
Pt states she was also seen at an urgent care [facility] after going to [facility] clinic. \"no one diagnosed me with anything or did anything. \"

## 2019-05-08 NOTE — DISCHARGE INSTRUCTIONS
Thank you for allowing us to care for you today. Please follow-up with your Primary Care provider in the next 2-3 days if your symptoms do not improve. Plan for home:     Zyrtec 10 mg daily for next 30 days. Mucinex twice daily for next 10 days. Rhinocort nasal spray. Wither 2 sprays once daily to each nostril or one spray twice daily to each nostril. Aim up and out with the opposite hand to the nostril when spraying to prevent irritation to the nasal septum. Afrin one spray to each nostril up to 3 times a day for no longer than 3 days for nasal congestion that keeps you awake at night. Using for 3 days or less will prevent rebound congestion. Tylenol 1000 mg alternating with Ibuprofen 600mg every 6 hours for headache pain/fever control. Example: 8am take Ibuprofen. 11am take Tylenol. 2pm take ibuprofen. 5pm take Tylenol. 8pm take ibuprofen. 11pm take Tylenol. You may continue this process overnight if you have continued pain/discomfort. Follow-up with primary care if symptoms do not improve. Patient Education        Seasonal Allergies: Care Instructions  Your Care Instructions  Allergies occur when your body's defense system (immune system) overreacts to certain substances. The immune system treats a harmless substance as if it were a harmful germ or virus. Many things can cause this to happen. Examples include pollens, medicine, food, dust, animal dander, and mold. Your allergies are seasonal if you have symptoms just at certain times of the year. In that case, you are probably allergic to pollens from certain trees, grasses, or weeds. Allergies can be mild or severe. Over-the-counter allergy medicine may help with some symptoms. Read and follow all instructions on the label. Managing your allergies is an important part of staying healthy. Your doctor may suggest that you have tests to help find the cause of your allergies.  When you know what things trigger your symptoms, you can avoid them. This can prevent allergy symptoms and other health problems. In some cases, immunotherapy might help. For this treatment, you get shots or use pills that have a small amount of certain allergens in them. Your body \"gets used to\" the allergen, so you react less to it over time. This kind of treatment may help prevent or reduce some allergy symptoms. Follow-up care is a key part of your treatment and safety. Be sure to make and go to all appointments, and call your doctor if you are having problems. It's also a good idea to know your test results and keep a list of the medicines you take. How can you care for yourself at home? · Be safe with medicines. Take your medicines exactly as prescribed. Call your doctor if you think you are having a problem with your medicine. · During your allergy season, keep windows closed. If you need to use air-conditioning, change or clean all filters every month. Take a shower and change your clothes after you have been outside. · Stay inside when pollen counts are high. Vacuum once or twice a week. Use a vacuum  with a HEPA filter or a double-thickness filter. When should you call for help? Give an epinephrine shot if:    · You think you are having a severe allergic reaction.    After giving an epinephrine shot, call 911, even if you feel better.   Call 911 if:    · You have symptoms of a severe allergic reaction. These may include:  ? Sudden raised, red areas (hives) all over your body. ? Swelling of the throat, mouth, lips, or tongue. ? Trouble breathing. ? Passing out (losing consciousness). Or you may feel very lightheaded or suddenly feel weak, confused, or restless.     · You have been given an epinephrine shot, even if you feel better.    Call your doctor now or seek immediate medical care if:    · You have symptoms of an allergic reaction, such as:  ? A rash or hives (raised, red areas on the skin). ? Itching. ? Swelling. ?  Belly pain, nausea, or vomiting.    Watch closely for changes in your health, and be sure to contact your doctor if:    · You do not get better as expected. Where can you learn more? Go to http://kelsey-swapna.info/. Enter J912 in the search box to learn more about \"Seasonal Allergies: Care Instructions. \"  Current as of: June 27, 2018  Content Version: 11.9  © 8595-1038 [employer]. Care instructions adapted under license by [employer] (which disclaims liability or warranty for this information). If you have questions about a medical condition or this instruction, always ask your healthcare professional. [address] any warranty or liability for your use of this information.

## 2019-05-08 NOTE — ED NOTES
Pt reports coughing up blood while getting her Xray done. Also reports chills sat until today. Per Chart review, pt was seen yesterday at [facility] clinic and diagnosed with bronchitis and HTN.

## 2019-08-01 ENCOUNTER — APPOINTMENT (OUTPATIENT)
Dept: GENERAL RADIOLOGY | Age: 54
End: 2019-08-01
Attending: NURSE PRACTITIONER
Payer: COMMERCIAL

## 2019-08-01 ENCOUNTER — APPOINTMENT (OUTPATIENT)
Dept: VASCULAR SURGERY | Age: 54
End: 2019-08-01
Attending: NURSE PRACTITIONER
Payer: COMMERCIAL

## 2019-08-01 ENCOUNTER — HOSPITAL ENCOUNTER (EMERGENCY)
Age: 54
Discharge: HOME | End: 2019-08-01
Attending: EMERGENCY MEDICINE
Payer: COMMERCIAL

## 2019-08-01 VITALS
DIASTOLIC BLOOD PRESSURE: 98 MMHG | RESPIRATION RATE: 18 BRPM | SYSTOLIC BLOOD PRESSURE: 164 MMHG | BODY MASS INDEX: 47.77 KG/M2 | HEART RATE: 71 BPM | HEIGHT: 68 IN | TEMPERATURE: 98.3 F | OXYGEN SATURATION: 98 %

## 2019-08-01 DIAGNOSIS — M51.36 DEGENERATIVE DISC DISEASE, LUMBAR: ICD-10-CM

## 2019-08-01 DIAGNOSIS — M54.5 ACUTE LOW BACK PAIN, UNSPECIFIED BACK PAIN LATERALITY, WITH SCIATICA PRESENCE UNSPECIFIED: Primary | ICD-10-CM

## 2019-08-01 PROCEDURE — 72100 X-RAY EXAM L-S SPINE 2/3 VWS: CPT

## 2019-08-01 PROCEDURE — 73630 X-RAY EXAM OF FOOT: CPT

## 2019-08-01 PROCEDURE — 99283 EMERGENCY DEPT VISIT LOW MDM: CPT

## 2019-08-01 PROCEDURE — 93971 EXTREMITY STUDY: CPT

## 2019-08-01 RX ORDER — IBUPROFEN 600 MG/1
600 TABLET ORAL
Qty: 20 TAB | Refills: 0 | Status: ON HOLD | OUTPATIENT
Start: 2019-08-01 | End: 2021-09-13

## 2019-08-01 NOTE — DISCHARGE INSTRUCTIONS
Learning About Degenerative Disc Disease  What is degenerative disc disease? Degenerative disc disease is not really a disease. It's a term used to describe the normal changes in your spinal discs as you age. Spinal discs are small, spongy discs that separate the bones (vertebrae) that make up the spine. The discs act as shock absorbers for the spine, so it can flex, bend, and twist.  Degenerative disc disease can take place in one or more places along the spine. It most often occurs in the discs in the lower back and the neck. What causes it? As we age, our spinal discs break down, or degenerate. This breakdown causes the symptoms of degenerative disc disease in some people. When the discs break down, they can lose fluid and dry out, and their outer layers can have tiny cracks or tears. This leads to less padding and less space between the bones in the spine. The body reacts to this by making bony growths on the spine called bone spurs. These spurs can press on the spinal nerve roots or spinal cord. This can cause pain and can affect how well the nerves work. What are the symptoms? Many people with degenerative disc disease have no pain. But others have severe pain or other symptoms that limit their activities. Some of the most common symptoms are:  · Pain in the back or neck. Where the pain occurs depends on which discs are affected. · Pain that gets worse when you move, such as bending over, reaching up, or twisting. · Pain that may occur in the rear end (buttocks), arm, or leg if a nerve is pinched. · Numbness or tingling in your arm or leg. How is it diagnosed? A doctor can often diagnose degenerative disc disease while doing a physical exam. If your exam shows no signs of a serious condition, imaging tests (such as an X-ray) aren't likely to help your doctor find the cause of your symptoms.   Sometimes degenerative disc disease is found when an X-ray is taken for another reason, such as an injury or other health problem. But even if the doctor finds degenerative disc disease, that doesn't always mean that you will have symptoms. How is it treated? There are several things you can do at home to manage pain from this problem. · To relieve pain, use ice or heat (whichever feels better) on the affected area. ? Put ice or a cold pack on the area for 10 to 20 minutes at a time. Put a thin cloth between the ice and your skin. ? Put a warm water bottle, a heating pad set on low, or a warm cloth on your back. Put a thin cloth between the heating pad and your skin. Do not go to sleep with a heating pad on your skin. · Ask your doctor if you can take acetaminophen (such as Tylenol) or nonsteroidal anti-inflammatory drugs, such as ibuprofen or naproxen. Your doctor can prescribe stronger medicines if needed. Be safe with medicines. Read and follow all instructions on the label. · Get some exercise every day. Exercise is one of the best ways to help your back feel better and stay better. It's best to start each exercise slowly. You may notice a little soreness, and that's okay. But if an exercise makes your pain worse, stop doing it. Here are things you can try:  ? Walking. It's the simplest and maybe the best activity for your back. It gets your blood moving and helps your muscles stay strong. ? Exercises that gently stretch and strengthen your stomach, back, and leg muscles. The stronger those muscles are, the better they're able to protect your back. If you have constant or severe pain in your back or spine, you may need other treatments, such as physical therapy. In some cases, your doctor may suggest surgery. Follow-up care is a key part of your treatment and safety. Be sure to make and go to all appointments, and call your doctor if you are having problems. It's also a good idea to know your test results and keep a list of the medicines you take. Where can you learn more?   Go to http://kelsey-swapna.info/. Enter J663 in the search box to learn more about \"Learning About Degenerative Disc Disease. \"  Current as of: September 20, 2018  Content Version: 12.1  © 5334-3512 [employer]. Care instructions adapted under license by [employer] (which disclaims liability or warranty for this information). If you have questions about a medical condition or this instruction, always ask your healthcare professional. [place] [address] any warranty or liability for your use of this information. Patient Education        Learning About Relief for Back Pain  What is back tension and strain? Back strain happens when you overstretch, or pull, a muscle in your back. You may hurt your back in an accident or when you exercise or lift something. Most back pain will get better with rest and time. You can take care of yourself at home to help your back heal.  What can you do first to relieve back pain? When you first feel back pain, try these steps:  · Walk. Take a short walk (10 to 20 minutes) on a level surface (no slopes, hills, or stairs) every 2 to 3 hours. Walk only distances you can manage without pain, especially leg pain. · Relax. Find a comfortable position for rest. Some people are comfortable on the floor or a medium-firm bed with a small pillow under their head and another under their knees. Some people prefer to lie on their side with a pillow between their knees. Don't stay in one position for too long. · Try heat or ice. Try using a heating pad on a low or medium setting, or take a warm shower, for 15 to 20 minutes every 2 to 3 hours. Or you can buy single-use heat wraps that last up to 8 hours. You can also try an ice pack for 10 to 15 minutes every 2 to 3 hours. You can use an ice pack or a bag of frozen vegetables wrapped in a thin towel.  There is not strong evidence that either heat or ice will help, but you can try them to see if they help. You may also want to try switching between heat and cold. · Take pain medicine exactly as directed. ¨ If the doctor gave you a prescription medicine for pain, take it as prescribed. ¨ If you are not taking a prescription pain medicine, ask your doctor if you can take an over-the-counter medicine. What else can you do? · Stretch and exercise. Exercises that increase flexibility may relieve your pain and make it easier for your muscles to keep your spine in a good, neutral position. And don't forget to keep walking. · Do self-massage. You can use self-massage to unwind after work or school or to energize yourself in the morning. You can easily massage your feet, hands, or neck. Self-massage works best if you are in comfortable clothes and are sitting or lying in a comfortable position. Use oil or lotion to massage bare skin. · Reduce stress. Back pain can lead to a vicious Creek: Distress about the pain tenses the muscles in your back, which in turn causes more pain. Learn how to relax your mind and your muscles to lower your stress. Where can you learn more? Go to http://kelsey-swapna.info/. Enter G265 in the search box to learn more about \"Learning About Relief for Back Pain. \"  Current as of: March 21, 2017  Content Version: 11.5  © 2981-7836 [employer]. Care instructions adapted under license by [employer] (which disclaims liability or warranty for this information). If you have questions about a medical condition or this instruction, always ask your healthcare professional. [place] [address] any warranty or liability for your use of this information.

## 2019-08-01 NOTE — ED PROVIDER NOTES
This is a 68-year-old female who presents ambulatory to the emergency room with complaints of lower back pain, left calf pain and left foot pain for approximately a \"couple days to 2 weeks. \" Patient denies any trauma, any recent falls. States that her lower back pain just started followed by left foot and lower leg pain. Patient has not taken any medication because she does not \"want to mask her symptoms. \" Patient states she runs 3 miles per day but has not been running recently. Denies any urinary incontinence, bowel incontinence states she has sensation of both. Patient also complaining of left calf pain but denies any recent travel. No chest pain, shortness of breath, dizziness. No nausea or vomiting. There are no further complaints at this time. None  History reviewed. No pertinent surgical history. Past Medical History:  No date: HTN (hypertension)  No date: Vertigo             Past Medical History:   Diagnosis Date    HTN (hypertension)     Vertigo        History reviewed. No pertinent surgical history.       Family History:   Problem Relation Age of Onset    No Known Problems Mother     No Known Problems Father        Social History     Socioeconomic History    Marital status: SINGLE     Spouse name: Not on file    Number of children: Not on file    Years of education: Not on file    Highest education level: Not on file   Occupational History    Not on file   Social Needs    Financial resource strain: Not on file    Food insecurity:     Worry: Not on file     Inability: Not on file    Transportation needs:     Medical: Not on file     Non-medical: Not on file   Tobacco Use    Smoking status: Never Smoker    Smokeless tobacco: Never Used   Substance and Sexual Activity    Alcohol use: Yes     Comment: occasional    Drug use: No    Sexual activity: Not on file   Lifestyle    Physical activity:     Days per week: Not on file     Minutes per session: Not on file    Stress: Not on file Relationships    Social connections:     Talks on phone: Not on file     Gets together: Not on file     Attends Religion service: Not on file     Active member of club or organization: Not on file     Attends meetings of clubs or organizations: Not on file     Relationship status: Not on file    Intimate partner violence:     Fear of current or ex partner: Not on file     Emotionally abused: Not on file     Physically abused: Not on file     Forced sexual activity: Not on file   Other Topics Concern    Not on file   Social History Narrative    Not on file         ALLERGIES: Claritin [loratadine]; Hydrocodone; Tylenol [acetaminophen]; and Oxycodone-acetaminophen    Review of Systems   Constitutional: Negative for appetite change, chills, diaphoresis, fatigue and fever. HENT: Negative for congestion, ear discharge, ear pain, sinus pressure, sinus pain, sore throat and trouble swallowing. Eyes: Negative for photophobia, pain, redness and visual disturbance. Respiratory: Negative for chest tightness, shortness of breath and wheezing. Cardiovascular: Negative for chest pain and palpitations. Gastrointestinal: Negative for abdominal distention, abdominal pain, nausea and vomiting. Endocrine: Negative. Genitourinary: Negative for difficulty urinating, flank pain, frequency and urgency. Musculoskeletal: Positive for arthralgias (left claf, left foot) and back pain. Negative for neck pain and neck stiffness. Skin: Negative for color change, pallor, rash and wound. Allergic/Immunologic: Negative. Neurological: Negative for dizziness, speech difficulty, weakness and headaches. Hematological: Does not bruise/bleed easily. Psychiatric/Behavioral: Negative for behavioral problems. The patient is not nervous/anxious.         Vitals:    08/01/19 1144 08/01/19 1231   BP:  (!) 162/15   Pulse: 88 71   Resp:  18   Temp:  98.6 °F (37 °C)   SpO2: 97% 98%   Height:  5' 8\" (1.727 m)            Physical Exam   Constitutional: She is oriented to person, place, and time. She appears well-developed and well-nourished. No distress. HENT:   Head: Normocephalic and atraumatic. Right Ear: External ear normal.   Left Ear: External ear normal.   Nose: Nose normal.   Mouth/Throat: Oropharynx is clear and moist.   Eyes: Pupils are equal, round, and reactive to light. Conjunctivae and EOM are normal. Right eye exhibits no discharge. Left eye exhibits no discharge. Neck: Normal range of motion. Neck supple. No JVD present. No tracheal deviation present. Cardiovascular: Normal rate, regular rhythm, normal heart sounds and intact distal pulses. Exam reveals no gallop. No murmur heard. Pulmonary/Chest: Effort normal and breath sounds normal. No respiratory distress. She has no wheezes. She has no rales. She exhibits no tenderness. Abdominal: Soft. Bowel sounds are normal. She exhibits no distension. There is no tenderness. There is no rebound and no guarding. Musculoskeletal: Normal range of motion. She exhibits tenderness (left calf, left foot, lumbar spine). She exhibits no edema. Neurological: She is alert and oriented to person, place, and time. Skin: Skin is warm and dry. No rash noted. No erythema. No pallor. Psychiatric: She has a normal mood and affect. Her behavior is normal. Judgment and thought content normal.   Nursing note and vitals reviewed. MDM  Number of Diagnoses or Management Options  Acute low back pain, unspecified back pain laterality, with sciatica presence unspecified: new and requires workup  Degenerative disc disease, lumbar: new and requires workup  Diagnosis management comments:   Plan:  Discharge to home and follow up with PCP. Follow up with ortho spine. Return with worsening symptoms. Patient in agreement with plan of care.          Amount and/or Complexity of Data Reviewed  Tests in the radiology section of CPT®: ordered and reviewed           Procedures  2:13 PM  Pt has been reexamined. Pt has no new complaints, changes or physical findings. Care plan outlined and precautions discussed. All available results were reviewed with pt. All medications were reviewed with pt. All of pt's questions and concerns were addressed. Pt agrees to F/U as instructed and agrees to return to ED upon further deterioration. Pt is ready to go home.   Dawit Freeman NP

## 2019-09-09 ENCOUNTER — HOSPITAL ENCOUNTER (OUTPATIENT)
Dept: MAMMOGRAPHY | Age: 54
Discharge: HOME | End: 2019-09-09
Attending: INTERNAL MEDICINE
Payer: COMMERCIAL

## 2019-09-09 DIAGNOSIS — Z12.31 VISIT FOR SCREENING MAMMOGRAM: ICD-10-CM

## 2019-09-09 PROCEDURE — 77067 SCR MAMMO BI INCL CAD: CPT

## 2021-09-12 ENCOUNTER — HOSPITAL ENCOUNTER (INPATIENT)
Age: 56
LOS: 4 days | Discharge: HOME | DRG: 282 | End: 2021-09-16
Attending: EMERGENCY MEDICINE | Admitting: INTERNAL MEDICINE
Payer: COMMERCIAL

## 2021-09-12 ENCOUNTER — HOSPITAL ENCOUNTER (EMERGENCY)
Dept: CT IMAGING | Age: 56
Discharge: HOME | DRG: 282 | End: 2021-09-12
Attending: EMERGENCY MEDICINE
Payer: COMMERCIAL

## 2021-09-12 ENCOUNTER — APPOINTMENT (OUTPATIENT)
Dept: ULTRASOUND IMAGING | Age: 56
DRG: 282 | End: 2021-09-12
Attending: INTERNAL MEDICINE
Payer: COMMERCIAL

## 2021-09-12 DIAGNOSIS — R10.13 ABDOMINAL PAIN, EPIGASTRIC: ICD-10-CM

## 2021-09-12 DIAGNOSIS — K85.90 ACUTE PANCREATITIS WITHOUT INFECTION OR NECROSIS, UNSPECIFIED PANCREATITIS TYPE: Primary | ICD-10-CM

## 2021-09-12 LAB
ALBUMIN SERPL-MCNC: 3 G/DL (ref 3.5–5)
ALBUMIN/GLOB SERPL: 0.6 {RATIO} (ref 1.1–2.2)
ALP SERPL-CCNC: 80 U/L (ref 45–117)
ALT SERPL-CCNC: 18 U/L (ref 12–78)
ANION GAP SERPL CALC-SCNC: 3 MMOL/L (ref 5–15)
APPEARANCE UR: CLEAR
AST SERPL-CCNC: 24 U/L (ref 15–37)
BACTERIA URNS QL MICRO: NEGATIVE /HPF
BASOPHILS # BLD: 0 K/UL (ref 0–0.1)
BASOPHILS NFR BLD: 0 % (ref 0–1)
BILIRUB SERPL-MCNC: 0.2 MG/DL (ref 0.2–1)
BILIRUB UR QL: NEGATIVE
BUN SERPL-MCNC: 13 MG/DL (ref 6–20)
BUN/CREAT SERPL: 18 (ref 12–20)
CALCIUM SERPL-MCNC: 9.2 MG/DL (ref 8.5–10.1)
CHLORIDE SERPL-SCNC: 108 MMOL/L (ref 97–108)
CHOLEST SERPL-MCNC: 175 MG/DL
CO2 SERPL-SCNC: 29 MMOL/L (ref 21–32)
COLOR UR: ABNORMAL
CREAT SERPL-MCNC: 0.72 MG/DL (ref 0.55–1.02)
DIFFERENTIAL METHOD BLD: NORMAL
EOSINOPHIL # BLD: 0.2 K/UL (ref 0–0.4)
EOSINOPHIL NFR BLD: 2 % (ref 0–7)
EPITH CASTS URNS QL MICRO: ABNORMAL /LPF
ERYTHROCYTE [DISTWIDTH] IN BLOOD BY AUTOMATED COUNT: 14.4 % (ref 11.5–14.5)
GLOBULIN SER CALC-MCNC: 5.2 G/DL (ref 2–4)
GLUCOSE SERPL-MCNC: 83 MG/DL (ref 65–100)
GLUCOSE UR STRIP.AUTO-MCNC: NEGATIVE MG/DL
HCT VFR BLD AUTO: 37.9 % (ref 35–47)
HDLC SERPL-MCNC: 54 MG/DL
HDLC SERPL: 3.2 {RATIO} (ref 0–5)
HGB BLD-MCNC: 11.8 G/DL (ref 11.5–16)
HGB UR QL STRIP: NEGATIVE
HYALINE CASTS URNS QL MICRO: ABNORMAL /LPF (ref 0–5)
IMM GRANULOCYTES # BLD AUTO: 0 K/UL (ref 0–0.04)
IMM GRANULOCYTES NFR BLD AUTO: 0 % (ref 0–0.5)
KETONES UR QL STRIP.AUTO: NEGATIVE MG/DL
LDLC SERPL CALC-MCNC: 111.8 MG/DL (ref 0–100)
LEUKOCYTE ESTERASE UR QL STRIP.AUTO: ABNORMAL
LIPASE SERPL-CCNC: >3000 U/L (ref 73–393)
LYMPHOCYTES # BLD: 2 K/UL (ref 0.8–3.5)
LYMPHOCYTES NFR BLD: 20 % (ref 12–49)
MCH RBC QN AUTO: 27.5 PG (ref 26–34)
MCHC RBC AUTO-ENTMCNC: 31.1 G/DL (ref 30–36.5)
MCV RBC AUTO: 88.3 FL (ref 80–99)
MONOCYTES # BLD: 0.7 K/UL (ref 0–1)
MONOCYTES NFR BLD: 7 % (ref 5–13)
NEUTS SEG # BLD: 7 K/UL (ref 1.8–8)
NEUTS SEG NFR BLD: 71 % (ref 32–75)
NITRITE UR QL STRIP.AUTO: NEGATIVE
NRBC # BLD: 0 K/UL (ref 0–0.01)
NRBC BLD-RTO: 0 PER 100 WBC
PH UR STRIP: 7.5 [PH] (ref 5–8)
PLATELET # BLD AUTO: 314 K/UL (ref 150–400)
PMV BLD AUTO: 9.2 FL (ref 8.9–12.9)
POTASSIUM SERPL-SCNC: 3.8 MMOL/L (ref 3.5–5.1)
PROT SERPL-MCNC: 8.2 G/DL (ref 6.4–8.2)
PROT UR STRIP-MCNC: ABNORMAL MG/DL
RBC # BLD AUTO: 4.29 M/UL (ref 3.8–5.2)
RBC #/AREA URNS HPF: ABNORMAL /HPF (ref 0–5)
SODIUM SERPL-SCNC: 140 MMOL/L (ref 136–145)
SP GR UR REFRACTOMETRY: 1.03 (ref 1–1.03)
TRIGL SERPL-MCNC: 46 MG/DL (ref ?–150)
UR CULT HOLD, URHOLD: NORMAL
UROBILINOGEN UR QL STRIP.AUTO: 1 EU/DL (ref 0.2–1)
VLDLC SERPL CALC-MCNC: 9.2 MG/DL
WBC # BLD AUTO: 9.9 K/UL (ref 3.6–11)
WBC URNS QL MICRO: ABNORMAL /HPF (ref 0–4)

## 2021-09-12 PROCEDURE — 74011250636 HC RX REV CODE- 250/636: Performed by: NURSE PRACTITIONER

## 2021-09-12 PROCEDURE — 83690 ASSAY OF LIPASE: CPT

## 2021-09-12 PROCEDURE — 99282 EMERGENCY DEPT VISIT SF MDM: CPT

## 2021-09-12 PROCEDURE — 74011250636 HC RX REV CODE- 250/636: Performed by: INTERNAL MEDICINE

## 2021-09-12 PROCEDURE — 74176 CT ABD & PELVIS W/O CONTRAST: CPT

## 2021-09-12 PROCEDURE — 85025 COMPLETE CBC W/AUTO DIFF WBC: CPT

## 2021-09-12 PROCEDURE — 80061 LIPID PANEL: CPT

## 2021-09-12 PROCEDURE — 74011250636 HC RX REV CODE- 250/636: Performed by: EMERGENCY MEDICINE

## 2021-09-12 PROCEDURE — 81001 URINALYSIS AUTO W/SCOPE: CPT

## 2021-09-12 PROCEDURE — 80053 COMPREHEN METABOLIC PANEL: CPT

## 2021-09-12 PROCEDURE — 65270000029 HC RM PRIVATE

## 2021-09-12 PROCEDURE — 76705 ECHO EXAM OF ABDOMEN: CPT

## 2021-09-12 PROCEDURE — 74011250637 HC RX REV CODE- 250/637: Performed by: EMERGENCY MEDICINE

## 2021-09-12 PROCEDURE — 74011000250 HC RX REV CODE- 250: Performed by: NURSE PRACTITIONER

## 2021-09-12 PROCEDURE — 96374 THER/PROPH/DIAG INJ IV PUSH: CPT

## 2021-09-12 RX ORDER — ENOXAPARIN SODIUM 100 MG/ML
40 INJECTION SUBCUTANEOUS EVERY 24 HOURS
Status: DISCONTINUED | OUTPATIENT
Start: 2021-09-12 | End: 2021-09-16 | Stop reason: HOSPADM

## 2021-09-12 RX ORDER — ACETAMINOPHEN 650 MG/1
650 SUPPOSITORY RECTAL
Status: DISCONTINUED | OUTPATIENT
Start: 2021-09-12 | End: 2021-09-16 | Stop reason: HOSPADM

## 2021-09-12 RX ORDER — POLYETHYLENE GLYCOL 3350 17 G/17G
17 POWDER, FOR SOLUTION ORAL DAILY PRN
Status: DISCONTINUED | OUTPATIENT
Start: 2021-09-12 | End: 2021-09-16 | Stop reason: HOSPADM

## 2021-09-12 RX ORDER — SODIUM CHLORIDE 0.9 % (FLUSH) 0.9 %
5-40 SYRINGE (ML) INJECTION EVERY 8 HOURS
Status: DISCONTINUED | OUTPATIENT
Start: 2021-09-12 | End: 2021-09-16 | Stop reason: HOSPADM

## 2021-09-12 RX ORDER — DICYCLOMINE HYDROCHLORIDE 10 MG/1
10 CAPSULE ORAL
Status: COMPLETED | OUTPATIENT
Start: 2021-09-12 | End: 2021-09-12

## 2021-09-12 RX ORDER — MORPHINE SULFATE 4 MG/ML
4 INJECTION INTRAVENOUS
Status: DISCONTINUED | OUTPATIENT
Start: 2021-09-12 | End: 2021-09-14 | Stop reason: SDUPTHER

## 2021-09-12 RX ORDER — FLUTICASONE PROPIONATE 50 MCG
2 SPRAY, SUSPENSION (ML) NASAL DAILY
Status: DISCONTINUED | OUTPATIENT
Start: 2021-09-13 | End: 2021-09-16 | Stop reason: HOSPADM

## 2021-09-12 RX ORDER — ONDANSETRON 2 MG/ML
4 INJECTION INTRAMUSCULAR; INTRAVENOUS
Status: DISCONTINUED | OUTPATIENT
Start: 2021-09-12 | End: 2021-09-16 | Stop reason: HOSPADM

## 2021-09-12 RX ORDER — HYDRALAZINE HYDROCHLORIDE 20 MG/ML
20 INJECTION INTRAMUSCULAR; INTRAVENOUS ONCE
Status: COMPLETED | OUTPATIENT
Start: 2021-09-12 | End: 2021-09-12

## 2021-09-12 RX ORDER — SODIUM CHLORIDE 9 MG/ML
100 INJECTION, SOLUTION INTRAVENOUS CONTINUOUS
Status: DISCONTINUED | OUTPATIENT
Start: 2021-09-12 | End: 2021-09-15

## 2021-09-12 RX ORDER — ONDANSETRON 2 MG/ML
4 INJECTION INTRAMUSCULAR; INTRAVENOUS
Status: COMPLETED | OUTPATIENT
Start: 2021-09-12 | End: 2021-09-12

## 2021-09-12 RX ORDER — ONDANSETRON 4 MG/1
4 TABLET, ORALLY DISINTEGRATING ORAL
Status: DISCONTINUED | OUTPATIENT
Start: 2021-09-12 | End: 2021-09-16 | Stop reason: HOSPADM

## 2021-09-12 RX ORDER — CETIRIZINE HCL 10 MG
10 TABLET ORAL DAILY
Status: DISCONTINUED | OUTPATIENT
Start: 2021-09-13 | End: 2021-09-16 | Stop reason: HOSPADM

## 2021-09-12 RX ORDER — ACETAMINOPHEN 325 MG/1
650 TABLET ORAL
Status: DISCONTINUED | OUTPATIENT
Start: 2021-09-12 | End: 2021-09-16 | Stop reason: HOSPADM

## 2021-09-12 RX ORDER — SODIUM CHLORIDE 0.9 % (FLUSH) 0.9 %
5-40 SYRINGE (ML) INJECTION AS NEEDED
Status: DISCONTINUED | OUTPATIENT
Start: 2021-09-12 | End: 2021-09-16 | Stop reason: HOSPADM

## 2021-09-12 RX ADMIN — SODIUM CHLORIDE 150 ML/HR: 9 INJECTION, SOLUTION INTRAVENOUS at 22:00

## 2021-09-12 RX ADMIN — HYDRALAZINE HYDROCHLORIDE 20 MG: 20 INJECTION INTRAMUSCULAR; INTRAVENOUS at 21:11

## 2021-09-12 RX ADMIN — SODIUM CHLORIDE 150 ML/HR: 9 INJECTION, SOLUTION INTRAVENOUS at 21:16

## 2021-09-12 RX ADMIN — ENOXAPARIN SODIUM 40 MG: 40 INJECTION SUBCUTANEOUS at 22:01

## 2021-09-12 RX ADMIN — ONDANSETRON 4 MG: 2 INJECTION INTRAMUSCULAR; INTRAVENOUS at 17:26

## 2021-09-12 RX ADMIN — Medication 10 ML: at 22:15

## 2021-09-12 RX ADMIN — DICYCLOMINE HYDROCHLORIDE 10 MG: 10 CAPSULE ORAL at 17:27

## 2021-09-12 RX ADMIN — ONDANSETRON 4 MG: 2 INJECTION INTRAMUSCULAR; INTRAVENOUS at 22:01

## 2021-09-12 RX ADMIN — SODIUM CHLORIDE 10 MG: 9 INJECTION INTRAMUSCULAR; INTRAVENOUS; SUBCUTANEOUS at 23:34

## 2021-09-12 RX ADMIN — MORPHINE SULFATE 4 MG: 4 INJECTION, SOLUTION INTRAMUSCULAR; INTRAVENOUS at 19:27

## 2021-09-12 RX ADMIN — SODIUM CHLORIDE 1000 ML: 9 INJECTION, SOLUTION INTRAVENOUS at 17:26

## 2021-09-12 NOTE — ED PROVIDER NOTES
Please note that this dictation was completed with [employer], the computer voice recognition software.  Quite often unanticipated grammatical, syntax, homophones, and other interpretive errors are inadvertently transcribed by the computer software.  Please disregard these errors.  Please excuse any errors that have escaped final proofreading. Patient is a 78-year-old female with history of hypertension and fibroids presenting to emergency department for evaluation of abdominal pain with onset 3 days ago. States the pain began shortly after drinking lemonade which she noticed had aspartame in it, she states years ago she had similar symptoms after consuming aspartame. She states pain is located throughout the abdomen is also has felt some pelvic and vaginal pain. She felt \"hot\" when urinating 2 days ago, but denies any specific dysuria or hematuria. She denies fever, chills, chest pain, shortness of breath, nausea, vomiting, diarrhea, constipation, bloody stool, vaginal bleeding, or any other medical complaints this time. Past Medical History:   Diagnosis Date    HTN (hypertension)     Vertigo        History reviewed. No pertinent surgical history.       Family History:   Problem Relation Age of Onset    No Known Problems Mother     No Known Problems Father        Social History     Socioeconomic History    Marital status: SINGLE     Spouse name: Not on file    Number of children: Not on file    Years of education: Not on file    Highest education level: Not on file   Occupational History    Not on file   Tobacco Use    Smoking status: Never Smoker    Smokeless tobacco: Never Used   Substance and Sexual Activity    Alcohol use: Yes     Comment: occasional    Drug use: No    Sexual activity: Not on file   Other Topics Concern    Not on file   Social History Narrative    Not on file     Social Determinants of Health     Financial Resource Strain:     Difficulty of Paying Living Expenses: Food Insecurity:     Worried About Running Out of Food in the Last Year:     [address] in the Last Year:    Transportation Needs:     Lack of Transportation (Medical):  Lack of Transportation (Non-Medical):    Physical Activity:     Days of Exercise per Week:     Minutes of Exercise per Session:    Stress:     Feeling of Stress :    Social Connections:     Frequency of Communication with Friends and Family:     Frequency of Social Gatherings with Friends and Family:     Attends [employer] Services:     Active Member of Clubs or Organizations:     Attends Club or Organization Meetings:     Marital Status:    Intimate Partner Violence:     Fear of Current or Ex-Partner:     Emotionally Abused:     Physically Abused:     Sexually Abused: ALLERGIES: Claritin [loratadine], Hydrocodone, Tylenol [acetaminophen], and Oxycodone-acetaminophen    Review of Systems   Constitutional: Negative for chills and fever. HENT: Negative for ear pain and sore throat. Eyes: Negative for visual disturbance. Respiratory: Negative for cough and shortness of breath. Cardiovascular: Negative for chest pain. Gastrointestinal: Positive for abdominal pain. Negative for diarrhea, nausea and vomiting. Genitourinary: Negative for flank pain, vaginal bleeding and vaginal discharge. Musculoskeletal: Negative for back pain. Skin: Negative for color change. Neurological: Negative for dizziness and headaches. Psychiatric/Behavioral: Negative for confusion. Vitals:    09/12/21 1601   Pulse: 78   Resp: 18   Temp: 97.9 °F (36.6 °C)   SpO2: 98%            Physical Exam  Vitals and nursing note reviewed. Constitutional:       General: She is not in acute distress. Appearance: Normal appearance. She is not ill-appearing. HENT:      Head: Normocephalic and atraumatic. Mouth/Throat:      Pharynx: Oropharynx is clear. Eyes:      Extraocular Movements: Extraocular movements intact. Conjunctiva/sclera: Conjunctivae normal.   Cardiovascular:      Rate and Rhythm: Normal rate and regular rhythm. Pulmonary:      Effort: Pulmonary effort is normal.      Breath sounds: Normal breath sounds. Abdominal:      Palpations: Abdomen is soft. Tenderness: There is generalized abdominal tenderness and tenderness in the epigastric area. There is guarding. There is no right CVA tenderness or left CVA tenderness. Negative signs include Gilbert's sign and McBurney's sign. Musculoskeletal:         General: Normal range of motion. Cervical back: No rigidity. Skin:     General: Skin is warm and dry. Neurological:      General: No focal deficit present. Mental Status: She is alert and oriented to person, place, and time. Psychiatric:         Mood and Affect: Mood normal.          MDM  Number of Diagnoses or Management Options  Abdominal pain, epigastric  Acute pancreatitis without infection or necrosis, unspecified pancreatitis type  Diagnosis management comments: Patient is alert, afebrile, vitals stable. Presents with 3 days of abdominal pain. Abdomen soft, nondistended, with tenderness throughout all 4 quadrants, however most severe pain is in the epigastric region. No nausea, vomiting, diarrhea, urinary changes. Labs show lipase >3,000, CT scan shows pancreatitis. Patient denies history of pancreatitis or alcohol abuse. Additional labs and urinalysis unremarkable. Patient is admitted to hospital for further work-up, observation, and management. Hospitalist notified and agrees. Attending ED provider Dr. Pancho Godwin is aware of patient and has had the opportunity to personally evaluate the patient, and agrees with admission and current plan. Patient informed of current management plan. All questions answered at this time. Will continue to monitor patient while in ED. ED Course as of Sep 12 1751   Sun Sep 12, 2021   1741 IMPRESSION  CT appearance suggests pancreatitis.    CT ABD PELV WO CONT [EP]   1742 Lipase(!): >3,000 [EP]   9932 METABOLIC PANEL, COMPREHENSIVE(!):    Sodium 140   Potassium 3.8   Chloride 108   CO2 29   Anion gap 3(!)   Glucose 83   BUN 13   Creatinine 0.72   BUN/Creatinine ratio 18   GFR est AA >60   GFR est non-AA >60   Calcium 9.2   Bilirubin, total 0.2   ALT 18   AST 24   Alk. phosphatase 80   Protein, total 8.2   Albumin 3.0(!)   Globulin 5.2(!)   A-G Ratio 0.6(!) [EP]      ED Course User Index  [EP] MOHAN Hood     Perfect Serve Consult for Admission  5:50 PM    ED Room Number: R40/R40  Patient Name and age:  Ilsa Henriquez 60 y.o.  female  Working Diagnosis:   1. Acute pancreatitis without infection or necrosis, unspecified pancreatitis type    2. Abdominal pain, epigastric        COVID-19 Suspicion:  no  Sepsis present:  no  Reassessment needed: no  Code Status:  Full Code  Readmission: no  Isolation Requirements:  no  Recommended Level of Care:  med/surg  Department:[facility] - 21   Other:  42-year-old female with epigastric  pain x3 days. Lipase >3,000, LFTs normal, CT shows pancreatitis. No hx of pancreatitis, denies frequent alcohol use.       Procedures

## 2021-09-12 NOTE — H&P
History & Physical    Primary Care Provider: Butch Martinez MD  Source of Information: Patient    History of Presenting Illness:   Antonia Meyers is a 60 y.o. female with medical history significant for hypertension and fibroids present to the hospital with chief complaint of abdominal pain of 3 days duration. Patient stated that the abdominal pain is mostly located at the right pelvic area though it first started at epigastric region which radiates to her throughout the abdomen including pelvic area. Patient denied alcohol intake except socially. But she stated on Saturday she had drinking limited which has aspartame triggers symptoms like this and patient had similar episode in the past.  She has some nausea, vomiting, diarrhea or constipation. She also denied urgency frequency or dysuria or hematuria. Review of Systems:  Review of Systems   Constitutional: Negative for activity change, appetite change and fatigue. HENT: Negative for ear pain, facial swelling, sore throat and trouble swallowing.    Eyes: No visual disturbance. Negative for pain and discharge. Respiratory: Negative for chest tightness, shortness of breath and wheezing.    Cardiovascular: Negative for chest pain and palpitations. Gastrointestinal: Per HPI   Genitourinary: Negative for difficulty urinating, flank pain and hematuria. Musculoskeletal: Negative for arthralgias, joint swelling, myalgias and neck pain. Skin: Negative for color change and rash. Neurological: Negative for  dizziness, headache, weakness or numbness. Hematological: Negative for adenopathy. Does not bruise/bleed easily. Psychiatric/Behavioral: Negative for behavioral problems, confusion and sleep disturbance.   All other systems reviewed and are negative. Past Medical History:   Diagnosis Date    HTN (hypertension)     Vertigo       History reviewed. No pertinent surgical history.   Prior to Admission medications    Medication Sig Start Date End Date Taking? Authorizing Provider   ibuprofen (MOTRIN) 600 mg tablet Take 1 Tab by mouth every six (6) hours as needed for Pain. 8/1/19   Viky Butterfield, ISAIAS   budesonide (RHINOCORT AQUA) 32 mcg/actuation nasal spray 2 Sprays by Both Nostrils route daily. 5/8/19   Stefanie KELSEY NP   cetirizine (ZYRTEC) 10 mg tablet Take 1 Tab by mouth daily. Indications: inflammation of the nose due to an allergy 5/8/19   Link Arin NP     Allergies   Allergen Reactions    Claritin [Loratadine] Other (comments)     Tachycardia    Hydrocodone Hives    Tylenol [Acetaminophen] Vertigo    Oxycodone-Acetaminophen Anxiety     hallucinations      Family History   Problem Relation Age of Onset    No Known Problems Mother     No Known Problems Father         SOCIAL HISTORY:  Patient resides:  Independently x   Assisted Living    SNF    With family care       Smoking history:   None x   Former    Chronic      Alcohol history:   None    Social x   Chronic      Ambulates:   Independently x   w/cane    w/walker    w/wc    CODE STATUS:  DNR    Full x   Other      Objective:     Physical Exam:     Visit Vitals  Pulse 78   Temp 97.9 °F (36.6 °C)   Resp 18   SpO2 98%      O2 Device: None (Room air)    General:  Alert, cooperative, no distress, appears stated age. Head:  Normocephalic, without obvious abnormality, atraumatic. Eyes:  Conjunctivae/corneas clear. PERRL, EOMs intact. Nose: Nares normal. Septum midline. Mucosa normal. No drainage or sinus tenderness. Throat: Lips, mucosa, and tongue normal. Teeth and gums normal.   Neck: Supple, symmetrical, trachea midline, no adenopathy, thyroid: no enlargement/tenderness/nodules, no carotid bruit and no JVD. Back:   Symmetric, no curvature. ROM normal. No CVA tenderness. Lungs:   Clear to auscultation bilaterally. Chest wall:  No tenderness or deformity.    Heart:  Regular rate and rhythm, S1, S2 normal, no murmur, click, rub or gallop. Abdomen:   Soft, mild tenderness at the RLQ. Bowel sounds normal. No masses,  No organomegaly. Extremities: Extremities normal, atraumatic, no cyanosis or edema. Pulses: 2+ and symmetric all extremities. Skin: Skin color, texture, turgor normal. No rashes or lesions   Neurologic: CNII-XII intact. Data Review:     Recent Days:  Recent Labs     09/12/21  1655   WBC 9.9   HGB 11.8   HCT 37.9        Recent Labs     09/12/21  1655      K 3.8      CO2 29   GLU 83   BUN 13   CREA 0.72   CA 9.2   ALB 3.0*   TBILI 0.2   ALT 18     No results for input(s): PH, PCO2, PO2, HCO3, FIO2 in the last 72 hours. 24 Hour Results:  Recent Results (from the past 24 hour(s))   CBC WITH AUTOMATED DIFF    Collection Time: 09/12/21  4:55 PM   Result Value Ref Range    WBC 9.9 3.6 - 11.0 K/uL    RBC 4.29 3.80 - 5.20 M/uL    HGB 11.8 11.5 - 16.0 g/dL    HCT 37.9 35.0 - 47.0 %    MCV 88.3 80.0 - 99.0 FL    MCH 27.5 26.0 - 34.0 PG    MCHC 31.1 30.0 - 36.5 g/dL    RDW 14.4 11.5 - 14.5 %    PLATELET 015 050 - 642 K/uL    MPV 9.2 8.9 - 12.9 FL    NRBC 0.0 0  WBC    ABSOLUTE NRBC 0.00 0.00 - 0.01 K/uL    NEUTROPHILS 71 32 - 75 %    LYMPHOCYTES 20 12 - 49 %    MONOCYTES 7 5 - 13 %    EOSINOPHILS 2 0 - 7 %    BASOPHILS 0 0 - 1 %    IMMATURE GRANULOCYTES 0 0.0 - 0.5 %    ABS. NEUTROPHILS 7.0 1.8 - 8.0 K/UL    ABS. LYMPHOCYTES 2.0 0.8 - 3.5 K/UL    ABS. MONOCYTES 0.7 0.0 - 1.0 K/UL    ABS. EOSINOPHILS 0.2 0.0 - 0.4 K/UL    ABS. BASOPHILS 0.0 0.0 - 0.1 K/UL    ABS. IMM.  GRANS. 0.0 0.00 - 0.04 K/UL    DF AUTOMATED     METABOLIC PANEL, COMPREHENSIVE    Collection Time: 09/12/21  4:55 PM   Result Value Ref Range    Sodium 140 136 - 145 mmol/L    Potassium 3.8 3.5 - 5.1 mmol/L    Chloride 108 97 - 108 mmol/L    CO2 29 21 - 32 mmol/L    Anion gap 3 (L) 5 - 15 mmol/L    Glucose 83 65 - 100 mg/dL    BUN 13 6 - 20 MG/DL    Creatinine 0.72 0.55 - 1.02 MG/DL    BUN/Creatinine ratio 18 12 - 20      GFR est AA >60 >60 ml/min/1.73m2    GFR est non-AA >60 >60 ml/min/1.73m2    Calcium 9.2 8.5 - 10.1 MG/DL    Bilirubin, total 0.2 0.2 - 1.0 MG/DL    ALT (SGPT) 18 12 - 78 U/L    AST (SGOT) 24 15 - 37 U/L    Alk. phosphatase 80 45 - 117 U/L    Protein, total 8.2 6.4 - 8.2 g/dL    Albumin 3.0 (L) 3.5 - 5.0 g/dL    Globulin 5.2 (H) 2.0 - 4.0 g/dL    A-G Ratio 0.6 (L) 1.1 - 2.2     LIPASE    Collection Time: 09/12/21  4:55 PM   Result Value Ref Range    Lipase >3,000 (H) 73 - 393 U/L   URINALYSIS W/MICROSCOPIC    Collection Time: 09/12/21  4:55 PM   Result Value Ref Range    Color YELLOW/STRAW      Appearance CLEAR CLEAR      Specific gravity 1.027 1.003 - 1.030      pH (UA) 7.5 5.0 - 8.0      Protein TRACE (A) NEG mg/dL    Glucose Negative NEG mg/dL    Ketone Negative NEG mg/dL    Bilirubin Negative NEG      Blood Negative NEG      Urobilinogen 1.0 0.2 - 1.0 EU/dL    Nitrites Negative NEG      Leukocyte Esterase TRACE (A) NEG      WBC 10-20 0 - 4 /hpf    RBC 0-5 0 - 5 /hpf    Epithelial cells MODERATE (A) FEW /lpf    Bacteria Negative NEG /hpf    Hyaline cast 2-5 0 - 5 /lpf   URINE CULTURE HOLD SAMPLE    Collection Time: 09/12/21  4:55 PM    Specimen: Serum; Urine   Result Value Ref Range    Urine culture hold        Urine on hold in Microbiology dept for 2 days. If unpreserved urine is submitted, it cannot be used for addtional testing after 24 hours, recollection will be required.          Imaging:     Assessment:     #Acute pancreatitis  #Hypertension       Plan:     # Acute pancreatitis  - Lipase> 3K, CT abdomen suggestive of pancreatitis  - Keep n.p.o.  - Start on IVF at 150 cc/hr NS  - Pain management with IV morphine  - Check gallbladder ultrasound, triglyceride level    # Hypertension: Patient not on medication     DVT ppx: Enoxaparin   Code: Full        Signed By: Alessio Rowe MD     September 12, 2021

## 2021-09-12 NOTE — ED TRIAGE NOTES
TRIAGE NOTE:  Patient arrives ambulatory with c/o abdominal pain and pain in vaginal area. Patient reports pain feels like the pain is pushing up.

## 2021-09-13 LAB
ANION GAP SERPL CALC-SCNC: 6 MMOL/L (ref 5–15)
BUN SERPL-MCNC: 12 MG/DL (ref 6–20)
BUN/CREAT SERPL: 17 (ref 12–20)
CALCIUM SERPL-MCNC: 8.9 MG/DL (ref 8.5–10.1)
CHLORIDE SERPL-SCNC: 109 MMOL/L (ref 97–108)
CO2 SERPL-SCNC: 24 MMOL/L (ref 21–32)
CREAT SERPL-MCNC: 0.7 MG/DL (ref 0.55–1.02)
ERYTHROCYTE [DISTWIDTH] IN BLOOD BY AUTOMATED COUNT: 14.4 % (ref 11.5–14.5)
GLUCOSE SERPL-MCNC: 110 MG/DL (ref 65–100)
HCT VFR BLD AUTO: 38.2 % (ref 35–47)
HGB BLD-MCNC: 11.9 G/DL (ref 11.5–16)
MCH RBC QN AUTO: 27.8 PG (ref 26–34)
MCHC RBC AUTO-ENTMCNC: 31.2 G/DL (ref 30–36.5)
MCV RBC AUTO: 89.3 FL (ref 80–99)
NRBC # BLD: 0 K/UL (ref 0–0.01)
NRBC BLD-RTO: 0 PER 100 WBC
PLATELET # BLD AUTO: 279 K/UL (ref 150–400)
PMV BLD AUTO: 8.7 FL (ref 8.9–12.9)
POTASSIUM SERPL-SCNC: 3.4 MMOL/L (ref 3.5–5.1)
RBC # BLD AUTO: 4.28 M/UL (ref 3.8–5.2)
SODIUM SERPL-SCNC: 139 MMOL/L (ref 136–145)
WBC # BLD AUTO: 12.4 K/UL (ref 3.6–11)

## 2021-09-13 PROCEDURE — 85027 COMPLETE CBC AUTOMATED: CPT

## 2021-09-13 PROCEDURE — 74011250636 HC RX REV CODE- 250/636: Performed by: INTERNAL MEDICINE

## 2021-09-13 PROCEDURE — 80048 BASIC METABOLIC PNL TOTAL CA: CPT

## 2021-09-13 PROCEDURE — 65270000029 HC RM PRIVATE

## 2021-09-13 PROCEDURE — 36415 COLL VENOUS BLD VENIPUNCTURE: CPT

## 2021-09-13 PROCEDURE — 74011250637 HC RX REV CODE- 250/637: Performed by: NURSE PRACTITIONER

## 2021-09-13 PROCEDURE — 74011250637 HC RX REV CODE- 250/637: Performed by: INTERNAL MEDICINE

## 2021-09-13 PROCEDURE — 99221 1ST HOSP IP/OBS SF/LOW 40: CPT | Performed by: NURSE PRACTITIONER

## 2021-09-13 RX ORDER — DICLOFENAC SODIUM 10 MG/G
GEL TOPICAL
COMMUNITY
End: 2021-10-25

## 2021-09-13 RX ORDER — NAPROXEN SODIUM 220 MG
220 TABLET ORAL
COMMUNITY
End: 2021-10-25

## 2021-09-13 RX ORDER — PSEUDOEPHEDRINE HCL 120 MG/1
120 TABLET, FILM COATED, EXTENDED RELEASE ORAL
COMMUNITY
End: 2021-10-25

## 2021-09-13 RX ORDER — DIPHENHYDRAMINE HCL 25 MG
25 TABLET ORAL
COMMUNITY
End: 2021-10-25

## 2021-09-13 RX ORDER — THERA TABS 400 MCG
1 TAB ORAL DAILY
COMMUNITY
End: 2022-06-20

## 2021-09-13 RX ORDER — ISOPROPYL ALCOHOL IN GLYCERIN 95 %-5 %
DROPS OTIC (EAR)
COMMUNITY
End: 2021-10-25

## 2021-09-13 RX ORDER — DIPHENHYDRAMINE HCL 25 MG
25 CAPSULE ORAL
Status: DISCONTINUED | OUTPATIENT
Start: 2021-09-13 | End: 2021-09-16 | Stop reason: HOSPADM

## 2021-09-13 RX ORDER — POTASSIUM CHLORIDE 14.9 MG/ML
10 INJECTION INTRAVENOUS
Status: COMPLETED | OUTPATIENT
Start: 2021-09-13 | End: 2021-09-13

## 2021-09-13 RX ADMIN — SODIUM CHLORIDE 150 ML/HR: 9 INJECTION, SOLUTION INTRAVENOUS at 23:46

## 2021-09-13 RX ADMIN — DIPHENHYDRAMINE HYDROCHLORIDE 25 MG: 25 CAPSULE ORAL at 23:45

## 2021-09-13 RX ADMIN — FLUTICASONE PROPIONATE 2 SPRAY: 50 SPRAY, METERED NASAL at 09:38

## 2021-09-13 RX ADMIN — CETIRIZINE HYDROCHLORIDE 10 MG: 10 TABLET, FILM COATED ORAL at 08:41

## 2021-09-13 RX ADMIN — POTASSIUM CHLORIDE 10 MEQ: 200 INJECTION, SOLUTION INTRAVENOUS at 08:41

## 2021-09-13 RX ADMIN — ENOXAPARIN SODIUM 40 MG: 40 INJECTION SUBCUTANEOUS at 19:46

## 2021-09-13 RX ADMIN — Medication 10 ML: at 23:45

## 2021-09-13 RX ADMIN — SODIUM CHLORIDE 150 ML/HR: 9 INJECTION, SOLUTION INTRAVENOUS at 04:31

## 2021-09-13 RX ADMIN — SODIUM CHLORIDE 150 ML/HR: 9 INJECTION, SOLUTION INTRAVENOUS at 13:10

## 2021-09-13 RX ADMIN — Medication 10 ML: at 06:29

## 2021-09-13 RX ADMIN — Medication 10 ML: at 14:00

## 2021-09-13 RX ADMIN — POTASSIUM CHLORIDE 10 MEQ: 200 INJECTION, SOLUTION INTRAVENOUS at 09:38

## 2021-09-13 NOTE — PROGRESS NOTES
Hospital follow-up new PCP transitional care appointment has been scheduled with Camron Hinkle DO for Wednesday, 9/29/21 at 8:00 a.m. Pending patient discharge.   Bianca Torres, Care Management Specialist.

## 2021-09-13 NOTE — ROUTINE PROCESS
TRANSFER - OUT REPORT:    Verbal report given to Mojgan Shabazz RN (name) on Jonathon Sheriff  being transferred to  (unit) for routine progression of care       Report consisted of patients Situation, Background, Assessment and   Recommendations(SBAR). Information from the following report(s) SBAR, Kardex, ED Summary, Intake/Output, MAR and Recent Results was reviewed with the receiving nurse. Lines:   Peripheral IV 09/12/21 Left Antecubital (Active)   Site Assessment Clean, dry, & intact 09/12/21 1648   Phlebitis Assessment 0 09/12/21 1648   Infiltration Assessment 0 09/12/21 1648   Dressing Status Clean, dry, & intact 09/12/21 1648   Dressing Type Transparent 09/12/21 1648   Hub Color/Line Status Pink 09/12/21 1648        Opportunity for questions and clarification was provided.       Patient transported with:   Registered Nurse

## 2021-09-13 NOTE — PROGRESS NOTES
Problem: Falls - Risk of  Goal: *Absence of Falls  Description: Document Dc Gonzalez Fall Risk and appropriate interventions in the flowsheet.   Outcome: Progressing Towards Goal  Note: Fall Risk Interventions:            Medication Interventions: Patient to call before getting OOB    Elimination Interventions: Call light in reach, Patient to call for help with toileting needs              Problem: Patient Education: Go to Patient Education Activity  Goal: Patient/Family Education  Outcome: Progressing Towards Goal

## 2021-09-13 NOTE — PROGRESS NOTES
Admission Medication Reconciliation:    Information obtained from:  Patient  RxQuery data available¹:  NO    Comments/Recommendations: Spoke with patient via secure phone call to patient's room due to general Covid-19 precautions. Discussed use of PTA medications including prescription, OTC, vitamins/supplements, inhaled, topical, nasal, injectable, otic and ophthalmic medications. Updated PTA meds/reviewed patient's allergies. 1)  Of note, patient taking numerous OTC medications for sinus congestion and headaches. Patient aware that Sudafed can increase her blood pressure. Reports she only uses Goody powder and Aleve sparingly for headaches when her symptoms are bad. Asking for alternate options. Recommended decongestant nasal spray if needed for short term use (<3 days) but also to consider intranasal steroids or similar option if frequent symptoms and follow up with PCP. Patient previously on Rhinocort and Zyrtec, but stopped taking. 2)  Medication changes (since last review): Added  - Sudafed  - Multivitamin  - Diphenhydramine  - Goody powder  - Naproxen  - Vicks vapor inhaler  - Diclofenac gel    Adjusted  - None    Removed  - Rhinocort nasal spray  - Cetirizine  - Ibuprofen     ¹RxQuery pharmacy benefit data reflects medications filled and processed through the patient's insurance, however   this data does NOT capture whether the medication was picked up or is currently being taken by the patient. Allergies:  Claritin [loratadine], Hydrocodone, Tylenol [acetaminophen], and Oxycodone-acetaminophen    Significant PMH/Disease States:   Past Medical History:   Diagnosis Date    HTN (hypertension)     Vertigo      Chief Complaint for this Admission:    Chief Complaint   Patient presents with    Abdominal Pain     Prior to Admission Medications:   Prior to Admission Medications   Prescriptions Last Dose Informant Taking?    Aspirin-Acetaminophen-Caffeine (Goody's Extra Strength) 500-325-65 mg pwpk   Yes Sig: Take  by mouth daily as needed for Pain or Other (Headache). L-desoxyephedrine (VICKS VAPOR INHALER NA)   Yes   Sig: by Nasal route as needed for Other (Congestion). diclofenac (VOLTAREN) 1 % gel   Yes   Sig: Apply  to affected area two (2) times daily as needed for Pain. diphenhydrAMINE (BENADRYL) 25 mg tablet   Yes   Sig: Take 25 mg by mouth every six (6) hours as needed for Congestion or Allergies. naproxen sodium (Aleve) 220 mg tablet   Yes   Sig: Take 220 mg by mouth daily as needed for Pain or Other (Headache). pseudoephedrine CR (Sudafed 12 Hour) 120 mg CR tablet   Yes   Sig: Take 120 mg by mouth daily as needed for Congestion. therapeutic multivitamin (THERAGRAN) tablet   Yes   Sig: Take 1 Tablet by mouth daily. Facility-Administered Medications: None     Please contact the main inpatient pharmacy with any questions or concerns at [phone] and we will direct you to the clinical pharmacist covering this patient's care while in-house.    SOURAV Gerardo

## 2021-09-13 NOTE — PROGRESS NOTES
STACEY:    RUR 5%    Disposition: Home    Transportation: Medicaid transport vs. Niece    Follow up: PCP/Specialist    Primary contact: Ronnie Son [phone]    Care Management Interventions  PCP Verified by CM: Yes  Palliative Care Criteria Met (RRAT>21 & CHF Dx)?: No  Mode of Transport at Discharge:  (Medicaid transport vs. Niece)  Consuelo Carrel: No  Discharge Durable Medical Equipment: No  Physical Therapy Consult: No  Occupational Therapy Consult: No  Support Systems: Other Family Member(s)  Confirm Follow Up Transport: Family (Medicaid transport)  Discharge Location  Discharge Placement: Home with one level    Reason for Admission:  Abdominal pain                     RUR Score:   5%                  Plan for utilizing home health:    No orders      PCP: First and Last name:  Needs new PCP. Email sent to [facility] specialist.   Name of Practice:    Are you a current patient: Yes/No:    Approximate date of last visit:    Can you participate in a virtual visit with your PCP:                     Current Advanced Directive/Advance Care Plan: Full Code      Healthcare Decision Maker:   Click here to complete [address] including selection of the Healthcare Decision Maker Relationship (ie \"Primary\")                             Transition of Care Plan:       CM met with patient to introduce CM role, verify demographics and begin discharge planning. Patient lives alone in an apartment. There are 3 steps to get into the apartment. Patient uses [facility] pharmacy for prescriptions. Patient uses Medicaid transportation to go to appointments. Insurance verified: Usama Guerra RN/CRM  [phone]

## 2021-09-13 NOTE — CONSULTS
[facility]  Inpatient Consultation        Admit Date: 9/12/2021  Reason for Consultation: cholecystolithiasis    HPI:  Percy Cornejo is a 60 y.o. female w/ hx as below whom we are asked to see in consultation by Dr. Melissa Dodge for the above complaint. Pt presented to the ED yesterday w/ a 3 day complaint of abdominal pain. The pain started in periumbilical area and LLQ and moved to epigastric area during the day yesterday. No vomiting, diarrhea, f/c. She has a hx of uterine fibroids. On admission her lipase was >3000 w/ nl LFTs and t bili. Pt denies alcohol abuse. Lipid panel doesn't indicate hypertriglyceridemia. CT shows pancreatitis and f/u [facility] shows cholecystolithiasis w/o cholecystitis. Today pt still has some LLQ pain, little pain in epigastric area. Patient Active Problem List    Diagnosis Date Noted    Acute pancreatitis 09/12/2021    HTN (hypertension), malignant 02/08/2018     Past Medical History:   Diagnosis Date    HTN (hypertension)     Vertigo       History reviewed. No pertinent surgical history. Social History     Tobacco Use    Smoking status: Never Smoker    Smokeless tobacco: Never Used   Substance Use Topics    Alcohol use: Yes     Comment: occasional      Family History   Problem Relation Age of Onset    No Known Problems Mother     No Known Problems Father       Prior to Admission medications    Medication Sig Start Date End Date Taking? Authorizing Provider   pseudoephedrine CR (Sudafed 12 Hour) 120 mg CR tablet Take 120 mg by mouth daily as needed for Congestion. Yes Provider, Historical   therapeutic multivitamin (THERAGRAN) tablet Take 1 Tablet by mouth daily. Yes Provider, Historical   diphenhydrAMINE (BENADRYL) 25 mg tablet Take 25 mg by mouth every six (6) hours as needed for Congestion or Allergies.    Yes Provider, Historical   Aspirin-Acetaminophen-Caffeine (Goody's Extra Strength) 500-325-65 mg pwpk Take  by mouth daily as needed for Pain or Other (Headache). Yes Provider, Historical   naproxen sodium (Aleve) 220 mg tablet Take 220 mg by mouth daily as needed for Pain or Other (Headache). Yes Provider, Historical   L-desoxyephedrine (VICKS VAPOR INHALER NA) by Nasal route as needed for Other (Congestion). Yes Provider, Historical   diclofenac (VOLTAREN) 1 % gel Apply  to affected area two (2) times daily as needed for Pain. Yes Provider, Historical   ibuprofen (MOTRIN) 600 mg tablet Take 1 Tab by mouth every six (6) hours as needed for Pain. 8/1/19 9/13/21  Kael Butterfield, NP   budesonide (RHINOCORT AQUA) 32 mcg/actuation nasal spray 2 Sprays by Both Nostrils route daily. 5/8/19 9/13/21  Yohana KELSEY NP   cetirizine (ZYRTEC) 10 mg tablet Take 1 Tab by mouth daily.  Indications: inflammation of the nose due to an allergy 5/8/19 9/13/21  Yohana KELSEY NP     Current Facility-Administered Medications   Medication Dose Route Frequency    sodium chloride (NS) flush 5-40 mL  5-40 mL IntraVENous Q8H    sodium chloride (NS) flush 5-40 mL  5-40 mL IntraVENous PRN    acetaminophen (TYLENOL) tablet 650 mg  650 mg Oral Q6H PRN    Or    acetaminophen (TYLENOL) suppository 650 mg  650 mg Rectal Q6H PRN    polyethylene glycol (MIRALAX) packet 17 g  17 g Oral DAILY PRN    ondansetron (ZOFRAN ODT) tablet 4 mg  4 mg Oral Q8H PRN    Or    ondansetron (ZOFRAN) injection 4 mg  4 mg IntraVENous Q6H PRN    enoxaparin (LOVENOX) injection 40 mg  40 mg SubCUTAneous Q24H    0.9% sodium chloride infusion  150 mL/hr IntraVENous CONTINUOUS    morphine injection 4 mg  4 mg IntraVENous Q4H PRN    cetirizine (ZYRTEC) tablet 10 mg  10 mg Oral DAILY    fluticasone propionate (FLONASE) 50 mcg/actuation nasal spray 2 Spray  2 Spray Both Nostrils DAILY     Allergies   Allergen Reactions    Claritin [Loratadine] Other (comments)     Tachycardia    Hydrocodone Hives    Tylenol [Acetaminophen] Vertigo    Oxycodone-Acetaminophen Anxiety hallucinations          Subjective:     Review of Systems:    A comprehensive review of systems was negative except for that written in the History of Present Illness. Objective:     Blood pressure (!) 158/98, pulse 77, temperature 97.6 °F (36.4 °C), resp. rate 16, weight 328 lb 0.7 oz (148.8 kg), SpO2 96 %. Temp (24hrs), Av.1 °F (36.7 °C), Min:97.6 °F (36.4 °C), Max:98.7 °F (37.1 °C)      Recent Labs     21  0139 21  1655   WBC 12.4* 9.9   HGB 11.9 11.8   HCT 38.2 37.9    314     Recent Labs     21  01321  1655    140   K 3.4* 3.8   * 108   CO2 24 29   * 83   BUN 12 13   CREA 0.70 0.72   CA 8.9 9.2   ALB  --  3.0*   TBILI  --  0.2   ALT  --  18     Recent Labs     21  1655   LPSE >3,000*       No intake or output data in the 24 hours ending 21 1423    _____________________  Physical Exam:     General:  Alert, cooperative, no distress, appears stated age. Eyes:   Sclera clear. Throat: Lips, mucosa, and tongue normal.   Neck: Supple, symmetrical, trachea midline. Lungs:   Clear to auscultation bilaterally. Heart:  Regular rate and rhythm. Abdomen:   Normal BS, obese, soft, TTP LLQ, minimal tenderness in epigastric area; No masses,  No organomegaly. Extremities: Extremities normal, atraumatic, no cyanosis or edema. Skin: Skin color, texture, turgor normal. No rashes or lesions. Assessment:   Active Problems:    Acute pancreatitis (2021)            Plan:     Cont to treat pancreatitis  Pt can have elective lap alyssa  Will schedule an appt w/ DR Wilman Vaz, next available    Thank you for allowing us to participate in the care of this patient. Total time spent with patient: 30 minutes. Signed By: Alex Cabezas NP     2021        I have independently examined the patient and have reviewed the chart. I agree with the above plan. The patient does appear to have pancreatitis.   The cause of this is not exactly clear. She does have gallstones present but also appears to have normal LFTs and no signs of common bile duct stones on imaging currently. Her LFTs are also normal on admission. She did not have LFTs today. We will check her LFTs tomorrow and lipase and see if those are coming down. She is having little less pain than before. I will advance her to sips of clears for tonight. If she is improving more she could go to clears tomorrow. Assuming she is not showing any signs of common bile duct stones we can let this pancreatitis improve then plan for outpatient laparoscopic cholecystectomy with cholangiogram in the next week or 2. I am following along.     Canelo Montoya MD

## 2021-09-13 NOTE — ACP (ADVANCE CARE PLANNING)
Advance Care Planning   Advance Care Planning Inpatient Note  [address] Department    Today's Date: 9/13/2021  Unit: [facility] 5W1 ORTHO SPINE    Received request from Health Care provider. Upon review of chart and communication with care team, patient's decision making abilities are not in question. Patient was/were present in the room during visit. Goals of ACP Conversation:  Discuss Advance Care planning documents    Health Care Decision Makers:    No healthcare decision makers have been documented. Click here to complete [address] including selection of the Healthcare Decision Maker Relationship (ie \"Primary\")    Summary:  No Decision Maker named by patient at this time    Advance Care Planning Documents (Patient Wishes) on file:  None     Assessment:      requested for Advance Directive (AMD) consult. Patient would like information concerning AMD.  Provided a blank copy of AMD, a copy of the booklet, \"Your Right to Decide,\" and information card describing availability of  and pastoral care services including telephone number. Explained the purpose and intent of AMD and explained the document page by page. Patient stated she would like to review this information prior to making a decision. Asked patient to have her nurse contact the  should she wish to complete AMD during this hospitalization and she agreed.     Interventions:  Provided education on documents for clarity and greater understanding  Deferred conversation as patient not interested in completing an advance directive at this time    Care Preferences Communicated:  No    Outcomes/Plan:  ACP Discussion Postponed    Brianna SahaRoc[facility] on 9/13/2021 at 1:21 PM

## 2021-09-13 NOTE — PROGRESS NOTES
Spiritual Care Assessment/Progress Note  [facility]      NAME: Horacio Lozano      MRN: [id number]  AGE: 60 y.o.  SEX: female  Jainism Affiliation: Buddhism   Language: English     9/13/2021     Total Time (in minutes): 26     Spiritual Assessment begun in [facility] through conversation with:         [x]Patient        [] Family    [] Friend(s)        Reason for Consult: Advance medical directive consult, Initial/Spiritual assessment, patient floor     Spiritual beliefs: (Please include comment if needed)     [x] Identifies with a lisa tradition:   Buddhism      [] Supported by a ilsa community:            [] Claims no spiritual orientation:           [] Seeking spiritual identity:                [] Adheres to an individual form of spirituality:           [] Not able to assess:                           Identified resources for coping:      [x] Prayer                               [] Music                  [] Guided Imagery     [x] Family/friends                 [] Pet visits     [] Devotional reading                         [] Unknown     [] Other:                                              Interventions offered during this visit: (See comments for more details)    Patient Interventions: Affirmation of emotions/emotional suffering, Affirmation of lisa, Catharsis/review of pertinent events in supportive environment, Coping skills reviewed/reinforced, Iconic (affirming the presence of God/Higher Power), Advance medical directive consult           Plan of Care:     [] Support spiritual and/or cultural needs    [x] Support AMD and/or advance care planning process      [] Support grieving process   [] Coordinate Rites and/or Rituals    [] Coordination with community clergy   [] No spiritual needs identified at this time   [] Detailed Plan of Care below (See Comments)  [] Make referral to Music Therapy  [] Make referral to Pet Therapy     [] Make referral to Addiction services  [] Make referral to [facility] Passages  [] Make referral to Spiritual Care Partner  [] No future visits requested        [x] Follow up upon further referrals     Comments:  visit for initial spiritual assessment and Advance Directive (AMD) consult. Patient reclining in bed, good eye contact, friendly. Says she is feeling better today than she was yesterday. Said she was not aware she had problems with her pancreas and is a little concerned, but remains hopeful. Goal is to regain her health and return home. Described good family support.  requested for Advance Directive (AMD) consult. Patient would like information concerning AMD.  Provided a blank copy of AMD, a copy of the booklet, \"Your Right to Decide,\" and information card describing availability of  and pastoral care services including telephone number. Explained the purpose and intent of AMD and explained the document page by page. Patient stated she would like to review this information prior to making a decision. Asked patient to have her nurse contact the  should she wish to complete AMD during this hospitalization and she agreed. She appeared comforted as a result of this visit and expressed gratitude for this visit. Rev.  Elizabeth Marquez MDiv, [facility], [facility]   paging service: [phone])

## 2021-09-13 NOTE — PROGRESS NOTES
Hospitalist Progress Note  Nyla Stevens MD  Answering service: [phone] OR [phone] from in house phone      Date of Service:  2021  NAME:  Violeta Purdy  :  1965  MRN:  [id number]      Admission Summary:   Violeta Purdy is a 60 y.o. female with medical history significant for hypertension and fibroids present to the hospital with chief complaint of abdominal pain of 3 days duration.     Patient stated that the abdominal pain is mostly located at the right pelvic area though it first started at epigastric region which radiates to her throughout the abdomen including pelvic area. Patient denied alcohol intake except socially. But she stated on Saturday she had drinking limited which has aspartame triggers symptoms like this and patient had similar episode in the past.  She has some nausea, vomiting, diarrhea or constipation. She also denied urgency frequency or dysuria or hematuria. Interval history / Subjective:      Patient seen and examined today. Patient still c/o RLQ pain. The epigastric pain is better. Assessment & Plan:     # Acute pancreatitis  - Lipase> 3K, CT abdomen suggestive of pancreatitis  - triglyceride okay   - gallbladder ultrasound Cholecystolithiasis  - on IVF at 150 cc/hr NS  - Pain management with IV morphine  - advance diet to clear liquids   - Surgery consult      # Hypertension: Patient not on medication      DVT ppx: Enoxaparin   Code: Full      Hospital Problems  Date Reviewed: 2/10/2018        Codes Class Noted POA    Acute pancreatitis ICD-10-CM: K85.90  ICD-9-CM: 130.7  2021 Unknown                Review of Systems:   Pertinent positive mentioned in interval hx/HPI. Other systems reviewed and negative     Vital Signs:    Last 24hrs VS reviewed since prior progress note.  Most recent are:  Visit Vitals  BP (!) 158/98   Pulse 77   Temp 97.6 °F (36.4 °C)   Resp 16   Wt 148.8 kg (328 lb 0.7 oz)   SpO2 96% BMI 49.88 kg/m²       No intake or output data in the 24 hours ending 09/13/21 1615     Physical Examination:             Constitutional:  No acute distress, cooperative, pleasant    ENT:  Oral mucous moist, oropharynx benign. Neck supple,    Resp:  CTA bilaterally. No wheezing/rhonchi/rales. No accessory muscle use   CV:  Regular rhythm, normal rate, no murmurs, gallops, rubs    GI:  Soft, non distended, non tender. normoactive bowel sounds, no hepatosplenomegaly     Musculoskeletal:  No edema, warm, 2+ pulses throughout    Neurologic:  Moves all extremities. AAOx3, CN II-XII reviewed            Data Review:   I personally reviewed labs and imaging     Labs:     Recent Labs     09/13/21  0139 09/12/21  1655   WBC 12.4* 9.9   HGB 11.9 11.8   HCT 38.2 37.9    314     Recent Labs     09/13/21  0139 09/12/21  1655    140   K 3.4* 3.8   * 108   CO2 24 29   BUN 12 13   CREA 0.70 0.72   * 83   CA 8.9 9.2     Recent Labs     09/12/21  1655   ALT 18   AP 80   TBILI 0.2   TP 8.2   ALB 3.0*   GLOB 5.2*   LPSE >3,000*     No results for input(s): INR, PTP, APTT, INREXT in the last 72 hours. No results for input(s): FE, TIBC, PSAT, FERR in the last 72 hours. No results found for: FOL, RBCF   No results for input(s): PH, PCO2, PO2 in the last 72 hours. No results for input(s): CPK, CKNDX, TROIQ in the last 72 hours.     No lab exists for component: CPKMB  Lab Results   Component Value Date/Time    Cholesterol, total 175 09/12/2021 04:55 PM    HDL Cholesterol 54 09/12/2021 04:55 PM    LDL, calculated 111.8 (H) 09/12/2021 04:55 PM    Triglyceride 46 09/12/2021 04:55 PM    CHOL/HDL Ratio 3.2 09/12/2021 04:55 PM     Lab Results   Component Value Date/Time    Glucose (POC) 78 12/01/2010 06:25 PM     Lab Results   Component Value Date/Time    Color YELLOW/STRAW 09/12/2021 04:55 PM    Appearance CLEAR 09/12/2021 04:55 PM    Specific gravity 1.027 09/12/2021 04:55 PM    Specific gravity >1.030 (H) 04/29/2015 12:15 PM    pH (UA) 7.5 09/12/2021 04:55 PM    Protein TRACE (A) 09/12/2021 04:55 PM    Glucose Negative 09/12/2021 04:55 PM    Ketone Negative 09/12/2021 04:55 PM    Bilirubin Negative 09/12/2021 04:55 PM    Urobilinogen 1.0 09/12/2021 04:55 PM    Nitrites Negative 09/12/2021 04:55 PM    Leukocyte Esterase TRACE (A) 09/12/2021 04:55 PM    Epithelial cells MODERATE (A) 09/12/2021 04:55 PM    Bacteria Negative 09/12/2021 04:55 PM    WBC 10-20 09/12/2021 04:55 PM    RBC 0-5 09/12/2021 04:55 PM         Medications Reviewed:     Current Facility-Administered Medications   Medication Dose Route Frequency    sodium chloride (NS) flush 5-40 mL  5-40 mL IntraVENous Q8H    sodium chloride (NS) flush 5-40 mL  5-40 mL IntraVENous PRN    acetaminophen (TYLENOL) tablet 650 mg  650 mg Oral Q6H PRN    Or    acetaminophen (TYLENOL) suppository 650 mg  650 mg Rectal Q6H PRN    polyethylene glycol (MIRALAX) packet 17 g  17 g Oral DAILY PRN    ondansetron (ZOFRAN ODT) tablet 4 mg  4 mg Oral Q8H PRN    Or    ondansetron (ZOFRAN) injection 4 mg  4 mg IntraVENous Q6H PRN    enoxaparin (LOVENOX) injection 40 mg  40 mg SubCUTAneous Q24H    0.9% sodium chloride infusion  150 mL/hr IntraVENous CONTINUOUS    morphine injection 4 mg  4 mg IntraVENous Q4H PRN    cetirizine (ZYRTEC) tablet 10 mg  10 mg Oral DAILY    fluticasone propionate (FLONASE) 50 mcg/actuation nasal spray 2 Spray  2 Spray Both Nostrils DAILY     ______________________________________________________________________  EXPECTED LENGTH OF STAY: - - -  ACTUAL LENGTH OF STAY:          1                 Prachi F Xavier, MD   Patient has given Verbal permission to discuss medical care with   persons present in the room and and also with contact as listed on face sheet. Please note that this dictation was completed with [employer], the computer voice recognition software.   Quite often unanticipated grammatical, syntax, homophones, and other interpretive errors are inadvertently transcribed by the computer software. Please disregard these errors. Please excuse any errors that have escaped final proofreading. Thank you.

## 2021-09-14 LAB
ALBUMIN SERPL-MCNC: 2.5 G/DL (ref 3.5–5)
ALBUMIN/GLOB SERPL: 0.6 {RATIO} (ref 1.1–2.2)
ALP SERPL-CCNC: 68 U/L (ref 45–117)
ALT SERPL-CCNC: 14 U/L (ref 12–78)
ANION GAP SERPL CALC-SCNC: 9 MMOL/L (ref 5–15)
AST SERPL-CCNC: 18 U/L (ref 15–37)
BILIRUB SERPL-MCNC: 0.4 MG/DL (ref 0.2–1)
BUN SERPL-MCNC: 8 MG/DL (ref 6–20)
BUN/CREAT SERPL: 13 (ref 12–20)
CALCIUM SERPL-MCNC: 8.3 MG/DL (ref 8.5–10.1)
CHLORIDE SERPL-SCNC: 113 MMOL/L (ref 97–108)
CO2 SERPL-SCNC: 22 MMOL/L (ref 21–32)
COMMENT, HOLDF: NORMAL
CREAT SERPL-MCNC: 0.63 MG/DL (ref 0.55–1.02)
ERYTHROCYTE [DISTWIDTH] IN BLOOD BY AUTOMATED COUNT: 14.4 % (ref 11.5–14.5)
GLOBULIN SER CALC-MCNC: 4.1 G/DL (ref 2–4)
GLUCOSE SERPL-MCNC: 67 MG/DL (ref 65–100)
HCT VFR BLD AUTO: 35.7 % (ref 35–47)
HGB BLD-MCNC: 11 G/DL (ref 11.5–16)
LIPASE SERPL-CCNC: 623 U/L (ref 73–393)
MCH RBC QN AUTO: 27.5 PG (ref 26–34)
MCHC RBC AUTO-ENTMCNC: 30.8 G/DL (ref 30–36.5)
MCV RBC AUTO: 89.3 FL (ref 80–99)
NRBC # BLD: 0 K/UL (ref 0–0.01)
NRBC BLD-RTO: 0 PER 100 WBC
PLATELET # BLD AUTO: 290 K/UL (ref 150–400)
PMV BLD AUTO: 9.2 FL (ref 8.9–12.9)
POTASSIUM SERPL-SCNC: 3.5 MMOL/L (ref 3.5–5.1)
PROT SERPL-MCNC: 6.6 G/DL (ref 6.4–8.2)
RBC # BLD AUTO: 4 M/UL (ref 3.8–5.2)
SAMPLES BEING HELD,HOLD: NORMAL
SODIUM SERPL-SCNC: 144 MMOL/L (ref 136–145)
WBC # BLD AUTO: 8.3 K/UL (ref 3.6–11)

## 2021-09-14 PROCEDURE — 74011250636 HC RX REV CODE- 250/636: Performed by: INTERNAL MEDICINE

## 2021-09-14 PROCEDURE — 65270000029 HC RM PRIVATE

## 2021-09-14 PROCEDURE — 74011250637 HC RX REV CODE- 250/637: Performed by: INTERNAL MEDICINE

## 2021-09-14 PROCEDURE — 36415 COLL VENOUS BLD VENIPUNCTURE: CPT

## 2021-09-14 PROCEDURE — 80053 COMPREHEN METABOLIC PANEL: CPT

## 2021-09-14 PROCEDURE — 99232 SBSQ HOSP IP/OBS MODERATE 35: CPT | Performed by: SURGERY

## 2021-09-14 PROCEDURE — 83690 ASSAY OF LIPASE: CPT

## 2021-09-14 PROCEDURE — 85027 COMPLETE CBC AUTOMATED: CPT

## 2021-09-14 RX ORDER — MORPHINE SULFATE 2 MG/ML
4 INJECTION, SOLUTION INTRAMUSCULAR; INTRAVENOUS
Status: DISCONTINUED | OUTPATIENT
Start: 2021-09-14 | End: 2021-09-16 | Stop reason: HOSPADM

## 2021-09-14 RX ORDER — HYDRALAZINE HYDROCHLORIDE 20 MG/ML
10 INJECTION INTRAMUSCULAR; INTRAVENOUS
Status: DISCONTINUED | OUTPATIENT
Start: 2021-09-14 | End: 2021-09-16

## 2021-09-14 RX ORDER — AMLODIPINE BESYLATE 5 MG/1
5 TABLET ORAL DAILY
Status: DISCONTINUED | OUTPATIENT
Start: 2021-09-14 | End: 2021-09-15

## 2021-09-14 RX ADMIN — SODIUM CHLORIDE 150 ML/HR: 9 INJECTION, SOLUTION INTRAVENOUS at 05:05

## 2021-09-14 RX ADMIN — SODIUM CHLORIDE 100 ML/HR: 9 INJECTION, SOLUTION INTRAVENOUS at 22:05

## 2021-09-14 RX ADMIN — CETIRIZINE HYDROCHLORIDE 10 MG: 10 TABLET, FILM COATED ORAL at 07:22

## 2021-09-14 RX ADMIN — HYDRALAZINE HYDROCHLORIDE 10 MG: 20 INJECTION, SOLUTION INTRAMUSCULAR; INTRAVENOUS at 22:25

## 2021-09-14 RX ADMIN — Medication 10 ML: at 22:06

## 2021-09-14 RX ADMIN — ENOXAPARIN SODIUM 40 MG: 40 INJECTION SUBCUTANEOUS at 22:05

## 2021-09-14 RX ADMIN — MORPHINE SULFATE 4 MG: 2 INJECTION, SOLUTION INTRAMUSCULAR; INTRAVENOUS at 15:02

## 2021-09-14 RX ADMIN — ONDANSETRON 4 MG: 4 TABLET, ORALLY DISINTEGRATING ORAL at 15:27

## 2021-09-14 RX ADMIN — HYDRALAZINE HYDROCHLORIDE 10 MG: 20 INJECTION, SOLUTION INTRAMUSCULAR; INTRAVENOUS at 10:33

## 2021-09-14 RX ADMIN — MORPHINE SULFATE 4 MG: 2 INJECTION, SOLUTION INTRAMUSCULAR; INTRAVENOUS at 11:13

## 2021-09-14 RX ADMIN — Medication 10 ML: at 07:23

## 2021-09-14 RX ADMIN — FLUTICASONE PROPIONATE 2 SPRAY: 50 SPRAY, METERED NASAL at 09:00

## 2021-09-14 RX ADMIN — AMLODIPINE BESYLATE 5 MG: 5 TABLET ORAL at 15:01

## 2021-09-14 NOTE — PROGRESS NOTES
[employer] [facility] at [facility]        Subjective   Having less pain today. She says her pain is little bit more in the left lower side now. She was tolerating sips of clears well. Objective     Patient Vitals for the past 24 hrs:   Temp Pulse Resp BP SpO2   09/14/21 0920 98.1 °F (36.7 °C) 81 18 (!) 207/109 97 %   09/14/21 0235    (!) 172/95    09/14/21 0219 99.1 °F (37.3 °C) 79 18  95 %   09/13/21 1958 99 °F (37.2 °C) 65 18 (!) 176/99 97 %   09/13/21 1400 97.8 °F (36.6 °C) 69 18 (!) 164/88 97 %       Date 09/13/21 0700 - 09/14/21 0659 09/14/21 0700 - 09/15/21 0659   Shift 7648-6663 6102-6238 24 Hour Total 7736-9846 7318-9046 24 Hour Total   INTAKE   P.O.  490 490        P. O.  490 490      Shift Total(mL/kg)  490(3.3) 490(3.3)      OUTPUT   Shift Total(mL/kg)         NET  490 490      Weight (kg) 148.8 148.8 148.8 148.8 148.8 148.8       PE  Pulm - CTAB  CV - RRR  Abd -soft, nondistended, bowel sounds present, mild tenderness to palpation left side    Labs  Recent Results (from the past 12 hour(s))   METABOLIC PANEL, COMPREHENSIVE    Collection Time: 09/14/21  3:28 AM   Result Value Ref Range    Sodium 144 136 - 145 mmol/L    Potassium 3.5 3.5 - 5.1 mmol/L    Chloride 113 (H) 97 - 108 mmol/L    CO2 22 21 - 32 mmol/L    Anion gap 9 5 - 15 mmol/L    Glucose 67 65 - 100 mg/dL    BUN 8 6 - 20 MG/DL    Creatinine 0.63 0.55 - 1.02 MG/DL    BUN/Creatinine ratio 13 12 - 20      GFR est AA >60 >60 ml/min/1.73m2    GFR est non-AA >60 >60 ml/min/1.73m2    Calcium 8.3 (L) 8.5 - 10.1 MG/DL    Bilirubin, total 0.4 0.2 - 1.0 MG/DL    ALT (SGPT) 14 12 - 78 U/L    AST (SGOT) 18 15 - 37 U/L    Alk.  phosphatase 68 45 - 117 U/L    Protein, total 6.6 6.4 - 8.2 g/dL    Albumin 2.5 (L) 3.5 - 5.0 g/dL    Globulin 4.1 (H) 2.0 - 4.0 g/dL    A-G Ratio 0.6 (L) 1.1 - 2.2     CBC W/O DIFF    Collection Time: 09/14/21  3:28 AM   Result Value Ref Range    WBC 8.3 3.6 - 11.0 K/uL    RBC 4.00 3.80 - 5.20 M/uL    HGB 11.0 (L) 11.5 - 16.0 g/dL    HCT 35.7 35.0 - 47.0 %    MCV 89.3 80.0 - 99.0 FL    MCH 27.5 26.0 - 34.0 PG    MCHC 30.8 30.0 - 36.5 g/dL    RDW 14.4 11.5 - 14.5 %    PLATELET 557 725 - 356 K/uL    MPV 9.2 8.9 - 12.9 FL    NRBC 0.0 0  WBC    ABSOLUTE NRBC 0.00 0.00 - 0.01 K/uL   SAMPLES BEING HELD    Collection Time: 09/14/21  3:28 AM   Result Value Ref Range    SAMPLES BEING HELD 1sst     COMMENT        Add-on orders for these samples will be processed based on acceptable specimen integrity and analyte stability, which may vary by analyte. LIPASE    Collection Time: 09/14/21  3:28 AM   Result Value Ref Range    Lipase 623 (H) 73 - 393 U/L         Assessment     Sandro Wu is a 60 y. o.yr old female with pancreatitis and cholelithiasis    Plan     She is doing well today having little less pain, but the pain has moved to the left side a little bit  I started her on clear liquids today she could possibly advance later today if she is feeling better  Her lipase is coming down  Her LFTs remain normal  Does not 100% clear if she has gallstone related pancreatitis but would recommend cholecystectomy as an outpatient in the next 2 weeks  I will work on getting this scheduled  Hopefully home soon in the next day or 2    Vick Sacks, MD

## 2021-09-14 NOTE — PROGRESS NOTES
Hospitalist Progress Note  Merced Brock MD  Answering service: [phone] OR [phone] from in house phone      Date of Service:  2021  NAME:  Kasandra Abreu  :  1965  MRN:  [id number]      Admission Summary:   Kasandra Abreu is a 60 y.o. female with medical history significant for hypertension and fibroids present to the hospital with chief complaint of abdominal pain of 3 days duration.     Patient stated that the abdominal pain is mostly located at the right pelvic area though it first started at epigastric region which radiates to her throughout the abdomen including pelvic area. Patient denied alcohol intake except socially. But she stated on Saturday she had drinking limited which has aspartame triggers symptoms like this and patient had similar episode in the past.  She has some nausea, vomiting, diarrhea or constipation. She also denied urgency frequency or dysuria or hematuria. Interval history / Subjective:      Patient seen and examined today. Patient feeling better. She was able to tolerate her clear liquid diet. She continued to complain pain on the RLQ. Assessment & Plan:     # Acute pancreatitis due to possible gallstones   - Lipase> 3K, CT abdomen suggestive of pancreatitis  - triglyceride okay   - gallbladder ultrasound Cholecystolithiasis  - reduce  IVF at 100 cc/hr NS  - Pain management with IV morphine  - advance diet to clear liquids   - Surgery input appt-- plan for elective cholecystectomy as outpatient      # Hypertension Urgency: Patient is not on medication at home.    - PRN hydralazine   - start on amlodipine 5mg daily     # Morbid obesity      DVT ppx: Enoxaparin   Code: Full   Dispo: home Tomorrow      Hospital Problems  Date Reviewed: 2/10/2018        Codes Class Noted POA    Acute pancreatitis ICD-10-CM: K85.90  ICD-9-CM: 769.8  2021 Unknown                Review of Systems:   Pertinent positive mentioned in interval hx/HPI. Other systems reviewed and negative     Vital Signs:    Last 24hrs VS reviewed since prior progress note. Most recent are:  Visit Vitals  BP (!) 207/109   Pulse 81   Temp 98.1 °F (36.7 °C)   Resp 18   Wt 148.8 kg (328 lb 0.7 oz)   SpO2 97%   BMI 49.88 kg/m²         Intake/Output Summary (Last 24 hours) at 9/14/2021 1021  Last data filed at 9/14/2021 0013  Gross per 24 hour   Intake 490 ml   Output    Net 490 ml        Physical Examination:             Constitutional:  No acute distress, cooperative, pleasant, obese    ENT:  Oral mucous moist, oropharynx benign. Neck supple,    Resp:  CTA bilaterally. No wheezing/rhonchi/rales. No accessory muscle use   CV:  Regular rhythm, normal rate, no murmurs, gallops, rubs    GI:  Soft, non distended, non tender. normoactive bowel sounds, no hepatosplenomegaly     Musculoskeletal:  No edema, warm, 2+ pulses throughout    Neurologic:  Moves all extremities. AAOx3, CN II-XII reviewed            Data Review:   I personally reviewed labs and imaging     Labs:     Recent Labs     09/14/21 0328 09/13/21 0139   WBC 8.3 12.4*   HGB 11.0* 11.9   HCT 35.7 38.2    279     Recent Labs     09/14/21 0328 09/13/21 0139 09/12/21  1655    139 140   K 3.5 3.4* 3.8   * 109* 108   CO2 22 24 29   BUN 8 12 13   CREA 0.63 0.70 0.72   GLU 67 110* 83   CA 8.3* 8.9 9.2     Recent Labs     09/14/21 0328 09/12/21  1655   ALT 14 18   AP 68 80   TBILI 0.4 0.2   TP 6.6 8.2   ALB 2.5* 3.0*   GLOB 4.1* 5.2*   LPSE 623* >3,000*     No results for input(s): INR, PTP, APTT, INREXT, INREXT in the last 72 hours. No results for input(s): FE, TIBC, PSAT, FERR in the last 72 hours. No results found for: FOL, RBCF   No results for input(s): PH, PCO2, PO2 in the last 72 hours. No results for input(s): CPK, CKNDX, TROIQ in the last 72 hours.     No lab exists for component: CPKMB  Lab Results   Component Value Date/Time    Cholesterol, total 175 09/12/2021 04:55 PM    HDL Cholesterol 54 09/12/2021 04:55 PM    LDL, calculated 111.8 (H) 09/12/2021 04:55 PM    Triglyceride 46 09/12/2021 04:55 PM    CHOL/HDL Ratio 3.2 09/12/2021 04:55 PM     Lab Results   Component Value Date/Time    Glucose (POC) 78 12/01/2010 06:25 PM     Lab Results   Component Value Date/Time    Color YELLOW/STRAW 09/12/2021 04:55 PM    Appearance CLEAR 09/12/2021 04:55 PM    Specific gravity 1.027 09/12/2021 04:55 PM    Specific gravity >1.030 (H) 04/29/2015 12:15 PM    pH (UA) 7.5 09/12/2021 04:55 PM    Protein TRACE (A) 09/12/2021 04:55 PM    Glucose Negative 09/12/2021 04:55 PM    Ketone Negative 09/12/2021 04:55 PM    Bilirubin Negative 09/12/2021 04:55 PM    Urobilinogen 1.0 09/12/2021 04:55 PM    Nitrites Negative 09/12/2021 04:55 PM    Leukocyte Esterase TRACE (A) 09/12/2021 04:55 PM    Epithelial cells MODERATE (A) 09/12/2021 04:55 PM    Bacteria Negative 09/12/2021 04:55 PM    WBC 10-20 09/12/2021 04:55 PM    RBC 0-5 09/12/2021 04:55 PM         Medications Reviewed:     Current Facility-Administered Medications   Medication Dose Route Frequency    hydrALAZINE (APRESOLINE) 20 mg/mL injection 10 mg  10 mg IntraVENous Q6H PRN    diphenhydrAMINE (BENADRYL) capsule 25 mg  25 mg Oral Q6H PRN    sodium chloride (NS) flush 5-40 mL  5-40 mL IntraVENous Q8H    sodium chloride (NS) flush 5-40 mL  5-40 mL IntraVENous PRN    acetaminophen (TYLENOL) tablet 650 mg  650 mg Oral Q6H PRN    Or    acetaminophen (TYLENOL) suppository 650 mg  650 mg Rectal Q6H PRN    polyethylene glycol (MIRALAX) packet 17 g  17 g Oral DAILY PRN    ondansetron (ZOFRAN ODT) tablet 4 mg  4 mg Oral Q8H PRN    Or    ondansetron (ZOFRAN) injection 4 mg  4 mg IntraVENous Q6H PRN    enoxaparin (LOVENOX) injection 40 mg  40 mg SubCUTAneous Q24H    0.9% sodium chloride infusion  100 mL/hr IntraVENous CONTINUOUS    morphine injection 4 mg  4 mg IntraVENous Q4H PRN    cetirizine (ZYRTEC) tablet 10 mg  10 mg Oral DAILY    fluticasone propionate (FLONASE) 50 mcg/actuation nasal spray 2 Spray  2 Spray Both Nostrils DAILY     ______________________________________________________________________  EXPECTED LENGTH OF STAY: - - -  ACTUAL LENGTH OF STAY:          2                 Prachi Park MD   Patient has given Verbal permission to discuss medical care with   persons present in the room and and also with contact as listed on face sheet. Please note that this dictation was completed with [employer], the computer voice recognition software. Quite often unanticipated grammatical, syntax, homophones, and other interpretive errors are inadvertently transcribed by the computer software. Please disregard these errors. Please excuse any errors that have escaped final proofreading. Thank you.

## 2021-09-14 NOTE — PROGRESS NOTES
Problem: Falls - Risk of  Goal: *Absence of Falls  Description: Document Lydia Armas Fall Risk and appropriate interventions in the flowsheet.   Outcome: Progressing Towards Goal  Note: Fall Risk Interventions:            Medication Interventions: Teach patient to arise slowly    Elimination Interventions: Call light in reach, Patient to call for help with toileting needs              Problem: Discharge Planning  Goal: *Discharge to safe environment  Outcome: Progressing Towards Goal

## 2021-09-15 ENCOUNTER — APPOINTMENT (OUTPATIENT)
Dept: GENERAL RADIOLOGY | Age: 56
DRG: 282 | End: 2021-09-15
Attending: STUDENT IN AN ORGANIZED HEALTH CARE EDUCATION/TRAINING PROGRAM
Payer: COMMERCIAL

## 2021-09-15 LAB
ALBUMIN SERPL-MCNC: 2.9 G/DL (ref 3.5–5)
ALBUMIN/GLOB SERPL: 0.5 {RATIO} (ref 1.1–2.2)
ALP SERPL-CCNC: 75 U/L (ref 45–117)
ALT SERPL-CCNC: 20 U/L (ref 12–78)
ANION GAP SERPL CALC-SCNC: 10 MMOL/L (ref 5–15)
ANION GAP SERPL CALC-SCNC: 8 MMOL/L (ref 5–15)
AST SERPL-CCNC: 20 U/L (ref 15–37)
BILIRUB SERPL-MCNC: 0.4 MG/DL (ref 0.2–1)
BUN SERPL-MCNC: 5 MG/DL (ref 6–20)
BUN SERPL-MCNC: 6 MG/DL (ref 6–20)
BUN/CREAT SERPL: 7 (ref 12–20)
BUN/CREAT SERPL: 9 (ref 12–20)
CALCIUM SERPL-MCNC: 8.8 MG/DL (ref 8.5–10.1)
CALCIUM SERPL-MCNC: 9 MG/DL (ref 8.5–10.1)
CHLORIDE SERPL-SCNC: 103 MMOL/L (ref 97–108)
CHLORIDE SERPL-SCNC: 104 MMOL/L (ref 97–108)
CO2 SERPL-SCNC: 23 MMOL/L (ref 21–32)
CO2 SERPL-SCNC: 25 MMOL/L (ref 21–32)
CREAT SERPL-MCNC: 0.68 MG/DL (ref 0.55–1.02)
CREAT SERPL-MCNC: 0.69 MG/DL (ref 0.55–1.02)
GLOBULIN SER CALC-MCNC: 5.6 G/DL (ref 2–4)
GLUCOSE BLD STRIP.AUTO-MCNC: 96 MG/DL (ref 65–117)
GLUCOSE SERPL-MCNC: 116 MG/DL (ref 65–100)
GLUCOSE SERPL-MCNC: 121 MG/DL (ref 65–100)
LIPASE SERPL-CCNC: 235 U/L (ref 73–393)
POTASSIUM SERPL-SCNC: 3.2 MMOL/L (ref 3.5–5.1)
POTASSIUM SERPL-SCNC: 3.2 MMOL/L (ref 3.5–5.1)
PROT SERPL-MCNC: 8.5 G/DL (ref 6.4–8.2)
SERVICE CMNT-IMP: NORMAL
SODIUM SERPL-SCNC: 136 MMOL/L (ref 136–145)
SODIUM SERPL-SCNC: 137 MMOL/L (ref 136–145)
TROPONIN I SERPL-MCNC: <0.05 NG/ML

## 2021-09-15 PROCEDURE — 74011250637 HC RX REV CODE- 250/637: Performed by: STUDENT IN AN ORGANIZED HEALTH CARE EDUCATION/TRAINING PROGRAM

## 2021-09-15 PROCEDURE — 93005 ELECTROCARDIOGRAM TRACING: CPT

## 2021-09-15 PROCEDURE — 80053 COMPREHEN METABOLIC PANEL: CPT

## 2021-09-15 PROCEDURE — 82962 GLUCOSE BLOOD TEST: CPT

## 2021-09-15 PROCEDURE — 74011250637 HC RX REV CODE- 250/637: Performed by: INTERNAL MEDICINE

## 2021-09-15 PROCEDURE — 74011250637 HC RX REV CODE- 250/637: Performed by: HOSPITALIST

## 2021-09-15 PROCEDURE — 74011250636 HC RX REV CODE- 250/636: Performed by: STUDENT IN AN ORGANIZED HEALTH CARE EDUCATION/TRAINING PROGRAM

## 2021-09-15 PROCEDURE — 65660000001 HC RM ICU INTERMED STEPDOWN

## 2021-09-15 PROCEDURE — 36415 COLL VENOUS BLD VENIPUNCTURE: CPT

## 2021-09-15 PROCEDURE — 74011250636 HC RX REV CODE- 250/636: Performed by: INTERNAL MEDICINE

## 2021-09-15 PROCEDURE — 84484 ASSAY OF TROPONIN QUANT: CPT

## 2021-09-15 PROCEDURE — 74011000250 HC RX REV CODE- 250: Performed by: STUDENT IN AN ORGANIZED HEALTH CARE EDUCATION/TRAINING PROGRAM

## 2021-09-15 PROCEDURE — 71045 X-RAY EXAM CHEST 1 VIEW: CPT

## 2021-09-15 PROCEDURE — 83690 ASSAY OF LIPASE: CPT

## 2021-09-15 PROCEDURE — 99232 SBSQ HOSP IP/OBS MODERATE 35: CPT | Performed by: SURGERY

## 2021-09-15 PROCEDURE — 74011250636 HC RX REV CODE- 250/636: Performed by: HOSPITALIST

## 2021-09-15 RX ORDER — AMLODIPINE BESYLATE 5 MG/1
10 TABLET ORAL ONCE
Status: COMPLETED | OUTPATIENT
Start: 2021-09-15 | End: 2021-09-15

## 2021-09-15 RX ORDER — HYDRALAZINE HYDROCHLORIDE 20 MG/ML
10 INJECTION INTRAMUSCULAR; INTRAVENOUS ONCE
Status: COMPLETED | OUTPATIENT
Start: 2021-09-15 | End: 2021-09-15

## 2021-09-15 RX ORDER — LABETALOL HYDROCHLORIDE 5 MG/ML
20 INJECTION, SOLUTION INTRAVENOUS ONCE
Status: DISCONTINUED | OUTPATIENT
Start: 2021-09-15 | End: 2021-09-15

## 2021-09-15 RX ORDER — CLONIDINE HYDROCHLORIDE 0.1 MG/1
0.1 TABLET ORAL
Status: COMPLETED | OUTPATIENT
Start: 2021-09-15 | End: 2021-09-15

## 2021-09-15 RX ORDER — AMLODIPINE BESYLATE 5 MG/1
10 TABLET ORAL DAILY
Status: DISCONTINUED | OUTPATIENT
Start: 2021-09-15 | End: 2021-09-16 | Stop reason: HOSPADM

## 2021-09-15 RX ORDER — METOPROLOL SUCCINATE 50 MG/1
50 TABLET, EXTENDED RELEASE ORAL DAILY
Status: DISCONTINUED | OUTPATIENT
Start: 2021-09-15 | End: 2021-09-16 | Stop reason: HOSPADM

## 2021-09-15 RX ADMIN — SODIUM CHLORIDE 7.5 MG/HR: 9 INJECTION, SOLUTION INTRAVENOUS at 09:40

## 2021-09-15 RX ADMIN — CLONIDINE HYDROCHLORIDE 0.1 MG: 0.1 TABLET ORAL at 03:51

## 2021-09-15 RX ADMIN — SODIUM CHLORIDE 5 MG/HR: 9 INJECTION, SOLUTION INTRAVENOUS at 05:08

## 2021-09-15 RX ADMIN — Medication 10 ML: at 15:14

## 2021-09-15 RX ADMIN — PHENYLEPHRINE HYDROCHLORIDE 1 SPRAY: 0.25 SPRAY NASAL at 18:06

## 2021-09-15 RX ADMIN — ONDANSETRON 4 MG: 2 INJECTION INTRAMUSCULAR; INTRAVENOUS at 10:42

## 2021-09-15 RX ADMIN — FLUTICASONE PROPIONATE 2 SPRAY: 50 SPRAY, METERED NASAL at 10:43

## 2021-09-15 RX ADMIN — MORPHINE SULFATE 4 MG: 2 INJECTION, SOLUTION INTRAMUSCULAR; INTRAVENOUS at 01:57

## 2021-09-15 RX ADMIN — ONDANSETRON 4 MG: 2 INJECTION INTRAMUSCULAR; INTRAVENOUS at 01:57

## 2021-09-15 RX ADMIN — AMLODIPINE BESYLATE 10 MG: 5 TABLET ORAL at 02:24

## 2021-09-15 RX ADMIN — Medication 10 ML: at 05:53

## 2021-09-15 RX ADMIN — AMLODIPINE BESYLATE 10 MG: 5 TABLET ORAL at 10:42

## 2021-09-15 RX ADMIN — ENOXAPARIN SODIUM 40 MG: 40 INJECTION SUBCUTANEOUS at 21:11

## 2021-09-15 RX ADMIN — HYDRALAZINE HYDROCHLORIDE 10 MG: 20 INJECTION INTRAMUSCULAR; INTRAVENOUS at 02:25

## 2021-09-15 RX ADMIN — CETIRIZINE HYDROCHLORIDE 10 MG: 10 TABLET, FILM COATED ORAL at 10:42

## 2021-09-15 RX ADMIN — SODIUM CHLORIDE 5 MG/HR: 9 INJECTION, SOLUTION INTRAVENOUS at 13:05

## 2021-09-15 RX ADMIN — METOPROLOL SUCCINATE 50 MG: 50 TABLET, EXTENDED RELEASE ORAL at 10:41

## 2021-09-15 NOTE — ADT AUTH CERT NOTES
9/14/21 ATTENDING NOTE by Celi Trinh       Review Status Review Entered   In Primary 9/15/2021 12:52      Criteria Review   Hospitalist Progress Note     Admission Summary:  60 y. o. female with medical history significant for hypertension and fibroids present to the hospital with chief complaint of abdominal pain of 3 days duration. Patient stated that the abdominal pain is mostly located at the right pelvic area though it first started at epigastric region which radiates to her throughout the abdomen including pelvic area.  Patient denied alcohol intake except socially. But she stated on Saturday she had drinking limited which has aspartame triggers symptoms like this and patient had similar episode in the past.  She has some nausea, vomiting, diarrhea or constipation.  She also denied urgency frequency or dysuria or hematuria. Interval history / Subjective:  Patient seen and examined today. Patient feeling better. She was able to tolerate her clear liquid diet. She continued to complain pain on the RLQ.      Physical Examination:                                             Constitutional:  No acute distress, cooperative, pleasant, obese   ENT:     Oral mucous moist, oropharynx benign. Neck supple,   Resp:    CTA bilaterally. No wheezing/rhonchi/rales. No accessory muscle use  CV:       Regular rhythm, normal rate, no murmurs, gallops, rubs   GI:       Soft, non distended, non tender. normoactive bowel sounds, no hepatosplenomegaly    Musculoskeletal:         No edema, warm, 2+ pulses throughout   Neurologic:      Moves all extremities.   AAOx3, CN II-XII reviewed     Assessment & Plan:  # Acute pancreatitis due to possible gallstones   - Lipase> 3K, CT abdomen suggestive of pancreatitis  - triglyceride okay   - gallbladder ultrasound Cholecystolithiasis  - reduce  IVF at 100 cc/hr NS  - Pain management with IV morphine  - advance diet to clear liquids   - Surgery input appt-- plan for elective cholecystectomy as outpatient   # Hypertension Urgency: Patient is not on medication at home. - PRN hydralazine   - start on amlodipine 5mg daily   # Morbid obesity   DVT ppx: Enoxaparin      Labs  WBC: 8.3  HGB: 11.0 (L)  PLATELET: 854  Sodium: 144  Potassium: 3.5  Glucose: 67  BUN: 8  Creatinine: 0.63  Calcium: 8.3 (L)  ALT: 14  AST: 18  Lipase: 623 (H)         Pancreatitis - Care Day 3 (9/14/2021) by Nya Bajwa       Review Status Review Entered   Completed 9/14/2021 16:20      Criteria Review      Care Day: 3 Care Date: 9/14/2021 Level of Care: Inpatient Floor    Guideline Day 2    Level Of Care    (X) ICU or floor    9/14/2021 16:20:00 EDT by Beatriz Cleveland      medical floor    Clinical Status    (X) * Hemodynamic stability    9/14/2021 16:20:00 EDT by Beatriz Cleveland      98.7 °F (37.1 °C) 67 18 185/83 98  RA    ( ) * Pain absent or reduced    Activity    (X) Activity as tolerated    9/14/2021 16:20:00 EDT by Beatriz Cleveland      up ad phillip    Routes    ( ) Possible oral hydration    9/14/2021 16:20:00 EDT by Beatriz Cleveland      0.9% sod chlor cont iv 150cc/hr    (X) Possible oral medications    9/14/2021 16:20:00 EDT by Beatriz Cleveland      norvasc 5mg po daily x1    (X) Oral diet as tolerated    9/14/2021 16:20:00 EDT by Beatriz Cleveland      sips of clear    Interventions    (X) * NG tube absent    9/14/2021 16:20:00 EDT by Beatriz Cleveland      absent    (X) Laboratory tests    9/14/2021 16:20:00 EDT by Beatriz Cleveland      Ca 8.3 hgb 11 lipase 623    Medications    (X) Parenteral analgesics    * Milestone   Additional Notes   General surgery progress note:   Assessment        Franny Elmore is a 60 y. o.yr old female with pancreatitis and cholelithiasis       Plan        She is doing well today having little less pain, but the pain has moved to the left side a little bit   I started her on clear liquids today she could possibly advance later today if she is feeling better   Her lipase is coming down Her LFTs remain normal   Does not 100% clear if she has gallstone related pancreatitis but would recommend cholecystectomy as an outpatient in the next 2 weeks   I will work on getting this scheduled   Hopefully home soon in the next day or 2      Assessment/plan:   Acute pancreatitis due to possible gallstones    - Lipase> 3K, CT abdomen suggestive of pancreatitis   - triglyceride okay    - gallbladder ultrasound Cholecystolithiasis   - reduce  IVF at 100 cc/hr NS   - Pain management with IV morphine   - advance diet to clear liquids    - Surgery input appt-- plan for elective cholecystectomy as outpatient        Hypertension Urgency: Patient is not on medication at home.     - PRN hydralazine    - start on amlodipine 5mg daily        Morbid obesity       Other orders:   Hydralazine 10mg iv q6hrs prn x1   Morphine 4mg iv q4hrs prn x2

## 2021-09-15 NOTE — PROGRESS NOTES
Notified Dr. Bryce Fitzpatrick about the patient's blood pressure after there was no change after giving her the ordered medication. He ordered more hydralazine and amlodipine and said to call the in [address] [place] [address] if there was not change. Contacted Dr. Ashley Corado at 3:20. Pt complained of dizziness, light headedness and experience increased N&V - called a rapid response.  Pt.'s blood pressure continues to be high reaching 237/121

## 2021-09-15 NOTE — PROGRESS NOTES
[facility] at [facility] Surgery        Subjective     Issues overnight with elevated blood pressure noted, could not tolerate much of her clear liquid diet yesterday    Objective     Patient Vitals for the past 24 hrs:   Temp Pulse Resp BP SpO2   09/15/21 0800  74 16 (!) 141/80    09/15/21 0750  85 16 (!) 150/80 99 %   09/15/21 0740  82 17 (!) 154/72 98 %   09/15/21 0730  76 15 (!) 135/109 96 %   09/15/21 0720  73 22 (!) 149/75 95 %   09/15/21 0710  72 22 (!) 151/74 94 %   09/15/21 0700  70 28 (!) 157/73 95 %   09/15/21 0650  66 (!) 41 (!) 157/71 94 %   09/15/21 0640  67 26 (!) 155/74 94 %   09/15/21 0630  85 22 (!) 158/78 97 %   09/15/21 0620  72 24 (!) 161/76 94 %   09/15/21 0610  69 23 (!) 163/75 94 %   09/15/21 0600  68 24 (!) 175/74 94 %   09/15/21 0550  61 26 (!) 157/78 95 %   09/15/21 0540  (!) 102 (!) 35 (!) 171/80    09/15/21 0520  (!) 59 21 (!) 187/84 94 %   09/15/21 0511 99.2 °F (37.3 °C) 69 22 (!) 205/87 95 %   09/15/21 0400  (!) 104  (!) 237/121    09/15/21 0308    (!) 190/96    09/15/21 0252    (!) 191/89    09/15/21 0224  63  (!) 217/121    09/14/21 2357    (!) 183/83    09/14/21 2301   18 (!) 186/78 95 %   09/14/21 2241    (!) 195/92    09/14/21 2225    (!) 204/102    09/14/21 2219  67      09/14/21 2214    (!) 224/105    09/14/21 2020 98.6 °F (37 °C) 68 18 (!) 194/96 98 %   09/14/21 1351 98.7 °F (37.1 °C) 67 18 (!) 185/83 98 %   09/14/21 1127    (!) 168/84    09/14/21 0920 98.1 °F (36.7 °C) 81 18 (!) 207/109 97 %       Date 09/14/21 0700 - 09/15/21 0659 09/15/21 0700 - 09/16/21 0659   Shift 1358-9979 2334-0074 24 Hour Total 4875-0869 8820-0653 24 Hour Total   INTAKE   P.O.  240 240        P. O.  240 240      Shift Total(mL/kg)  240(1.6) 240(1.6)      OUTPUT   Urine(mL/kg/hr)  300(0.2) 300(0.1)        Urine Voided  300 300      Shift Total(mL/kg)  300(2) 300(2)      NET -60 -60      Weight (kg) 148.8 148 148 148 148 148       PE  Pulm - CTAB  CV - RRR  Abd -soft, nondistended, bowel sounds present, minimally tenderness to palpation in the left side    Labs  Recent Results (from the past 12 hour(s))   TROPONIN I    Collection Time: 09/15/21  4:50 AM   Result Value Ref Range    Troponin-I, Qt. <0.05 <9.74 ng/mL   METABOLIC PANEL, BASIC    Collection Time: 09/15/21  4:50 AM   Result Value Ref Range    Sodium 137 136 - 145 mmol/L    Potassium 3.2 (L) 3.5 - 5.1 mmol/L    Chloride 104 97 - 108 mmol/L    CO2 25 21 - 32 mmol/L    Anion gap 8 5 - 15 mmol/L    Glucose 116 (H) 65 - 100 mg/dL    BUN 5 (L) 6 - 20 MG/DL    Creatinine 0.69 0.55 - 1.02 MG/DL    BUN/Creatinine ratio 7 (L) 12 - 20      GFR est AA >60 >60 ml/min/1.73m2    GFR est non-AA >60 >60 ml/min/1.73m2    Calcium 9.0 8.5 - 10.1 MG/DL         Assessment     Gwen Tenorio is a 60 y. o.yr old female with pancreatitis and cholelithiasis    Plan     Unfortunately she did not have any LFTs or lipase today  This is supposed to be ordered for today  Continue clear liquid diet as tolerated  No immediate plans for surgery  Most likely will plan for outpatient surgery and we will schedule this when she is feeling better and improving    Stevie Rodriguez MD

## 2021-09-15 NOTE — PROGRESS NOTES
Hospitalist Progress Note  Ester Delarosa MD  Answering service: [phone] OR [phone] from in house phone      Date of Service:  9/15/2021  NAME:  Horacio Lozano  :  1965  MRN:  [id number]      Admission Summary:   Horacio Lozano is a 60 y.o. female with medical history significant for hypertension and fibroids present to the hospital with chief complaint of abdominal pain of 3 days duration.     Patient stated that the abdominal pain is mostly located at the right pelvic area though it first started at epigastric region which radiates to her throughout the abdomen including pelvic area. Patient denied alcohol intake except socially. But she stated on Saturday she had drinking limited which has aspartame triggers symptoms like this and patient had similar episode in the past.  She has some nausea, vomiting, diarrhea or constipation. She also denied urgency frequency or dysuria or hematuria. Interval history / Subjective:      Patient seen and examined today. Patient admitted for abdominal pain. Overnight event noted. Patient was having high blood pressure needing drip. She now c/o sinus headache. Still c/o RLQ pain. Patient denied any sexual activity last 2-3 years. No vaginal discharge, fever     Assessment & Plan:     # Acute pancreatitis due to possible gallstones   - Lipase> 3K, CT abdomen suggestive of pancreatitis  - triglyceride okay   - gallbladder ultrasound showing Cholecystolithiasis  - lipase trended down   - off IVF due to super high bp  - Pain management with IV morphine  - advance diet to clear liquids   - Surgery input appt-- plan for elective cholecystectomy as outpatient in 2 weeks      # Hypertension Urgency: Patient is not on medication at home.    - on nicardipine gtt , plan to wean off   - start on oral Metoprolol and amlodipine   - obtain troponin and EKG     # Morbid obesity      DVT ppx: Enoxaparin   Code: Full   Dispo: home tomorrow      Hospital Problems  Date Reviewed: 2/10/2018        Codes Class Noted POA    Acute pancreatitis ICD-10-CM: K85.90  ICD-9-CM: 398.5  9/12/2021 Unknown                Review of Systems:   Pertinent positive mentioned in interval hx/HPI. Other systems reviewed and negative     Vital Signs:    Last 24hrs VS reviewed since prior progress note. Most recent are:  Visit Vitals  BP (!) 141/80   Pulse 74   Temp 99.2 °F (37.3 °C)   Resp 16   Wt 148 kg (326 lb 4.5 oz)   SpO2 99%   BMI 49.61 kg/m²         Intake/Output Summary (Last 24 hours) at 9/15/2021 0813  Last data filed at 9/15/2021 0511  Gross per 24 hour   Intake 240 ml   Output 300 ml   Net -60 ml        Physical Examination:             Constitutional:  No acute distress, cooperative, pleasant, obese    ENT:  Oral mucous moist, oropharynx benign. Neck supple,    Resp:  CTA bilaterally. No wheezing/rhonchi/rales. No accessory muscle use   CV:  Regular rhythm, normal rate, no murmurs, gallops, rubs    GI:  Soft, non distended, non tender. normoactive bowel sounds, no hepatosplenomegaly     Musculoskeletal:  No edema, warm, 2+ pulses throughout    Neurologic:  Moves all extremities. AAOx3, CN II-XII reviewed            Data Review:   I personally reviewed labs and imaging     Labs:     Recent Labs     09/14/21 0328 09/13/21 0139   WBC 8.3 12.4*   HGB 11.0* 11.9   HCT 35.7 38.2    279     Recent Labs     09/15/21  0450 09/14/21 0328 09/13/21  0139    144 139   K 3.2* 3.5 3.4*    113* 109*   CO2 25 22 24   BUN 5* 8 12   CREA 0.69 0.63 0.70   * 67 110*   CA 9.0 8.3* 8.9     Recent Labs     09/14/21 0328 09/12/21  1655   ALT 14 18   AP 68 80   TBILI 0.4 0.2   TP 6.6 8.2   ALB 2.5* 3.0*   GLOB 4.1* 5.2*   LPSE 623* >3,000*     No results for input(s): INR, PTP, APTT, INREXT, INREXT in the last 72 hours. No results for input(s): FE, TIBC, PSAT, FERR in the last 72 hours.    No results found for: FOL, RBCF   No results for input(s): PH, PCO2, PO2 in the last 72 hours.   Recent Labs     09/15/21  0450   TROIQ <0.05     Lab Results   Component Value Date/Time    Cholesterol, total 175 09/12/2021 04:55 PM    HDL Cholesterol 54 09/12/2021 04:55 PM    LDL, calculated 111.8 (H) 09/12/2021 04:55 PM    Triglyceride 46 09/12/2021 04:55 PM    CHOL/HDL Ratio 3.2 09/12/2021 04:55 PM     Lab Results   Component Value Date/Time    Glucose (POC) 78 12/01/2010 06:25 PM     Lab Results   Component Value Date/Time    Color YELLOW/STRAW 09/12/2021 04:55 PM    Appearance CLEAR 09/12/2021 04:55 PM    Specific gravity 1.027 09/12/2021 04:55 PM    Specific gravity >1.030 (H) 04/29/2015 12:15 PM    pH (UA) 7.5 09/12/2021 04:55 PM    Protein TRACE (A) 09/12/2021 04:55 PM    Glucose Negative 09/12/2021 04:55 PM    Ketone Negative 09/12/2021 04:55 PM    Bilirubin Negative 09/12/2021 04:55 PM    Urobilinogen 1.0 09/12/2021 04:55 PM    Nitrites Negative 09/12/2021 04:55 PM    Leukocyte Esterase TRACE (A) 09/12/2021 04:55 PM    Epithelial cells MODERATE (A) 09/12/2021 04:55 PM    Bacteria Negative 09/12/2021 04:55 PM    WBC 10-20 09/12/2021 04:55 PM    RBC 0-5 09/12/2021 04:55 PM         Medications Reviewed:     Current Facility-Administered Medications   Medication Dose Route Frequency    niCARdipine (CARDENE) 25 mg in 0.9% sodium chloride 250 mL infusion  0-15 mg/hr IntraVENous TITRATE    amLODIPine (NORVASC) tablet 10 mg  10 mg Oral DAILY    metoprolol succinate (TOPROL-XL) XL tablet 50 mg  50 mg Oral DAILY    hydrALAZINE (APRESOLINE) 20 mg/mL injection 10 mg  10 mg IntraVENous Q6H PRN    morphine injection 4 mg  4 mg IntraVENous Q4H PRN    diphenhydrAMINE (BENADRYL) capsule 25 mg  25 mg Oral Q6H PRN    sodium chloride (NS) flush 5-40 mL  5-40 mL IntraVENous Q8H    sodium chloride (NS) flush 5-40 mL  5-40 mL IntraVENous PRN    acetaminophen (TYLENOL) tablet 650 mg  650 mg Oral Q6H PRN    Or    acetaminophen (TYLENOL) suppository 650 mg  650 mg Rectal Q6H PRN  polyethylene glycol (MIRALAX) packet 17 g  17 g Oral DAILY PRN    ondansetron (ZOFRAN ODT) tablet 4 mg  4 mg Oral Q8H PRN    Or    ondansetron (ZOFRAN) injection 4 mg  4 mg IntraVENous Q6H PRN    enoxaparin (LOVENOX) injection 40 mg  40 mg SubCUTAneous Q24H    cetirizine (ZYRTEC) tablet 10 mg  10 mg Oral DAILY    fluticasone propionate (FLONASE) 50 mcg/actuation nasal spray 2 Spray  2 Spray Both Nostrils DAILY     ______________________________________________________________________  EXPECTED LENGTH OF STAY: - - -  ACTUAL LENGTH OF STAY:          3                 Prachi Park MD   Patient has given Verbal permission to discuss medical care with   persons present in the room and and also with contact as listed on face sheet. Please note that this dictation was completed with FaceAlerta, the computer voice recognition software. Quite often unanticipated grammatical, syntax, homophones, and other interpretive errors are inadvertently transcribed by the computer software. Please disregard these errors. Please excuse any errors that have escaped final proofreading. Thank you.

## 2021-09-15 NOTE — PROGRESS NOTES
1: TRANSFER - IN REPORT:    Verbal report received from Sandro Cooley (name) on Adebayo Chua  being received from [address] [place] [address] (unit) for urgent transfer      Report consisted of patients Situation, Background, Assessment and   Recommendations(SBAR). Information from the following report(s) SBAR, Kardex, ED Summary, Intake/Output, MAR and Recent Results was reviewed with the receiving nurse. Opportunity for questions and clarification was provided. Assessment completed upon patients arrival to unit and care assumed. 1910: Updated sister on patient condition. 1930: Bedside shift change report given to Naya Nolasco and Swapnil Abebe (oncoming nurse) by Blanquita Gutierrez (offgoing nurse). Report included the following information SBAR, Kardex, ED Summary, Intake/Output, MAR and Recent Results.

## 2021-09-15 NOTE — PROGRESS NOTES
Responded to RRT for hypertensive pt with new symptoms of nausea, headache and vomiting. Systolic BP at bedside 078. Pt had previously received 10mg of iv hydralazine x2 doses. Prior to RRT, I had ordered patient be transferred to tele and be administered an iv dose of labetalol. Floor nurses insisted Labetalol could not be done on floor per protocol  Later notified that no beds were available to transfer patient and bed was pending. Ordered clonidine po pending transfer to tele floor. RRT called due to patient symptoms. Insisted that patient be transferred to tele and Nicardipene gtt be initiated. Multiple attempts at transferring pt to appropriate level of care were hindered by lack of beds and nursing unwillingness to administer IV meds against floor/unit protocol.      Karyle Devonshire, MD

## 2021-09-15 NOTE — PROGRESS NOTES
09/15/21 0511   Vitals   Temp 99.2 °F (37.3 °C)   Temp Source Oral   Pulse (Heart Rate) 69   Heart Rate Source Monitor   Resp Rate 22   O2 Sat (%) 95 %   Level of Consciousness Alert (0)   BP (!) 205/87   MAP (Calculated) 126   BP 1 Location Right arm   BP 1 Method Automatic   BP Patient Position At rest   Cardiac Rhythm Sinus Rhythm   MEWS Score 4   Alarms Set and Audible Cardiac alarms   Box Number Hardwired   Electrodes Replaced Yes   Pain 1   Pain Scale 1 Numeric (0 - 10)   Pain Intensity 1 10   Patient Stated Pain Goal 0   Pain Onset 1 Acute   Pain Location 1 Head;Shoulder   Pain Orientation 1 Left;Mid   Pain Description 1 Aching;Constant   Pain Intervention(s) 1 Declines   Oxygen Therapy   O2 Device None (Room air)   Patient Observation   Special Appliances/Equipment Bed (comment)   Repositioned Head of bed elevated (degrees)   Patient Turned Turns self   Observations Assessment   Ambulate No (Comment)   Activity In bed   Mode of Transportation Wheelchair;IV equipment;Stretcher   Height/Weight   Height 5' 8\" (1.727 m)   Weight 148 kg (326 lb 4.5 oz)   Weight Source Standing scale   BSA (calculated - sq m) 2.66 sq meters   BMI (calculated) 49.6     MD aware of increased BP

## 2021-09-16 VITALS
SYSTOLIC BLOOD PRESSURE: 150 MMHG | HEART RATE: 64 BPM | HEIGHT: 68 IN | OXYGEN SATURATION: 97 % | RESPIRATION RATE: 16 BRPM | TEMPERATURE: 99.1 F | DIASTOLIC BLOOD PRESSURE: 86 MMHG | BODY MASS INDEX: 44.41 KG/M2 | WEIGHT: 293 LBS

## 2021-09-16 LAB
COMMENT, HOLDF: NORMAL
ERYTHROCYTE [DISTWIDTH] IN BLOOD BY AUTOMATED COUNT: 14.4 % (ref 11.5–14.5)
EST. AVERAGE GLUCOSE BLD GHB EST-MCNC: 128 MG/DL
GLUCOSE BLD STRIP.AUTO-MCNC: 101 MG/DL (ref 65–117)
HBA1C MFR BLD: 6.1 % (ref 4–5.6)
HCT VFR BLD AUTO: 36.7 % (ref 35–47)
HGB BLD-MCNC: 11.6 G/DL (ref 11.5–16)
MCH RBC QN AUTO: 27.6 PG (ref 26–34)
MCHC RBC AUTO-ENTMCNC: 31.6 G/DL (ref 30–36.5)
MCV RBC AUTO: 87.4 FL (ref 80–99)
NRBC # BLD: 0 K/UL (ref 0–0.01)
NRBC BLD-RTO: 0 PER 100 WBC
PLATELET # BLD AUTO: 341 K/UL (ref 150–400)
PMV BLD AUTO: 9.1 FL (ref 8.9–12.9)
RBC # BLD AUTO: 4.2 M/UL (ref 3.8–5.2)
SAMPLES BEING HELD,HOLD: NORMAL
SERVICE CMNT-IMP: NORMAL
WBC # BLD AUTO: 9.2 K/UL (ref 3.6–11)

## 2021-09-16 PROCEDURE — 74011250637 HC RX REV CODE- 250/637: Performed by: INTERNAL MEDICINE

## 2021-09-16 PROCEDURE — 85027 COMPLETE CBC AUTOMATED: CPT

## 2021-09-16 PROCEDURE — 99232 SBSQ HOSP IP/OBS MODERATE 35: CPT | Performed by: SURGERY

## 2021-09-16 PROCEDURE — 36415 COLL VENOUS BLD VENIPUNCTURE: CPT

## 2021-09-16 PROCEDURE — 83036 HEMOGLOBIN GLYCOSYLATED A1C: CPT

## 2021-09-16 PROCEDURE — 82962 GLUCOSE BLOOD TEST: CPT

## 2021-09-16 RX ORDER — AMLODIPINE BESYLATE 10 MG/1
10 TABLET ORAL DAILY
Qty: 30 TABLET | Refills: 0 | Status: SHIPPED | OUTPATIENT
Start: 2021-09-17 | End: 2021-10-17

## 2021-09-16 RX ORDER — METOPROLOL SUCCINATE 50 MG/1
50 TABLET, EXTENDED RELEASE ORAL DAILY
Qty: 30 TABLET | Refills: 0 | Status: SHIPPED | OUTPATIENT
Start: 2021-09-17 | End: 2021-10-17

## 2021-09-16 RX ORDER — HYDRALAZINE HYDROCHLORIDE 50 MG/1
50 TABLET, FILM COATED ORAL 3 TIMES DAILY
Qty: 90 TABLET | Refills: 0 | Status: SHIPPED | OUTPATIENT
Start: 2021-09-16 | End: 2021-10-16

## 2021-09-16 RX ORDER — HYDRALAZINE HYDROCHLORIDE 20 MG/ML
20 INJECTION INTRAMUSCULAR; INTRAVENOUS
Status: DISCONTINUED | OUTPATIENT
Start: 2021-09-16 | End: 2021-09-16 | Stop reason: HOSPADM

## 2021-09-16 RX ORDER — HYDRALAZINE HYDROCHLORIDE 50 MG/1
50 TABLET, FILM COATED ORAL 3 TIMES DAILY
Status: DISCONTINUED | OUTPATIENT
Start: 2021-09-16 | End: 2021-09-16 | Stop reason: HOSPADM

## 2021-09-16 RX ADMIN — AMLODIPINE BESYLATE 10 MG: 5 TABLET ORAL at 09:16

## 2021-09-16 RX ADMIN — Medication 10 ML: at 15:36

## 2021-09-16 RX ADMIN — Medication 10 ML: at 07:39

## 2021-09-16 RX ADMIN — HYDRALAZINE HYDROCHLORIDE 50 MG: 50 TABLET, FILM COATED ORAL at 13:51

## 2021-09-16 RX ADMIN — FLUTICASONE PROPIONATE 2 SPRAY: 50 SPRAY, METERED NASAL at 09:21

## 2021-09-16 RX ADMIN — CETIRIZINE HYDROCHLORIDE 10 MG: 10 TABLET, FILM COATED ORAL at 09:16

## 2021-09-16 NOTE — DISCHARGE SUMMARY
Discharge Summary       PATIENT ID: Effie Mcardle  MRN: [id number]   YOB: 1965    DATE OF ADMISSION: 9/12/2021  4:12 PM    DATE OF DISCHARGE: 9/16/2021   PRIMARY CARE PROVIDER: Trey Lee MD     ATTENDING PHYSICIAN: Lynne Montgomery MD  DISCHARGING PROVIDER: Lynne Montgomery MD    To contact this individual call [phone] and ask the  to page. If unavailable ask to be transferred the Adult Hospitalist Department. CONSULTATIONS: IP CONSULT TO GENERAL SURGERY    PROCEDURES/SURGERIES: * No surgery found *    ADMITTING DIAGNOSES & HOSPITAL COURSE:   Evaluated and treated for acute pancreatitis. Likely gallstone induced. Will need lap alyssa as op. Risk of Re-Admission: low  DISCHARGE DIAGNOSES / PLAN:      # Acute pancreatitis due to gallstones   - Lipase> 3K, CT abdomen suggestive of pancreatitis- triglyceride okay   - gallbladder ultrasound showing Cholecystolithiasis- advance diet tolerating regular diet. - Surgery input appt-- plan for elective cholecystectomy as outpatient in 2 weeks.      # Hypertension Urgency: Patient is not on medication at home. - nicardipine gtt weaned off, - start on oral Metoprolol and amlodipine, added hydralazine. Monitor BP closely op.     #. Morbid obesity: BMI 50. counseled on health benefits of weight loss, Healthy diet     FOLLOW UP APPOINTMENTS:    Follow-up Information     Follow up With Specialties Details Why Contact Info    Elvia Robles DO Family Medicine On 9/29/2021 Hospital follow up new primary care appointment Wednesday, 9/29/21 at 8:00 a.m. Please arrive 15 minutes early with photo ID, insurance card and a listing of all medications.   [place] [address]  [phone]      Trey Lee MD Pulmonary Disease In 1 week  [address]      Dilia Gonzalez MD General Surgery, Bariatrics In 2 weeks  [address] [address]  [phone]           ADDITIONAL CARE RECOMMENDATIONS:  Follow up with PCP and surgery for lap alyssa    DIET: Resume previous diet    ACTIVITY: Activity as tolerated    DISCHARGE MEDICATIONS:  Current Discharge Medication List      START taking these medications    Details   amLODIPine (NORVASC) 10 mg tablet Take 1 Tablet by mouth daily for 30 days. Qty: 30 Tablet, Refills: 0  Start date: 9/17/2021, End date: 10/17/2021      hydrALAZINE (APRESOLINE) 50 mg tablet Take 1 Tablet by mouth three (3) times daily for 30 days. Qty: 90 Tablet, Refills: 0  Start date: 9/16/2021, End date: 10/16/2021      metoprolol succinate (TOPROL-XL) 50 mg XL tablet Take 1 Tablet by mouth daily for 30 days. Qty: 30 Tablet, Refills: 0  Start date: 9/17/2021, End date: 10/17/2021         CONTINUE these medications which have NOT CHANGED    Details   pseudoephedrine CR (Sudafed 12 Hour) 120 mg CR tablet Take 120 mg by mouth daily as needed for Congestion. therapeutic multivitamin (THERAGRAN) tablet Take 1 Tablet by mouth daily. diphenhydrAMINE (BENADRYL) 25 mg tablet Take 25 mg by mouth every six (6) hours as needed for Congestion or Allergies. Aspirin-Acetaminophen-Caffeine (Goody's Extra Strength) 500-325-65 mg pwpk Take  by mouth daily as needed for Pain or Other (Headache).      naproxen sodium (Aleve) 220 mg tablet Take 220 mg by mouth daily as needed for Pain or Other (Headache). L-desoxyephedrine (VICKS VAPOR INHALER NA) by Nasal route as needed for Other (Congestion). diclofenac (VOLTAREN) 1 % gel Apply  to affected area two (2) times daily as needed for Pain. STOP taking these medications       budesonide (RHINOCORT AQUA) 32 mcg/actuation nasal spray Comments:   Reason for Stopping:         cetirizine (ZYRTEC) 10 mg tablet Comments:   Reason for Stopping:             NOTIFY YOUR PHYSICIAN FOR ANY OF THE FOLLOWING:   Fever over 101 degrees for 24 hours.    Chest pain, shortness of breath, fever, chills, nausea, vomiting, diarrhea, change in mentation, falling, weakness, bleeding. Severe pain or pain not relieved by medications. Or, any other signs or symptoms that you may have questions about. DISPOSITION:    Home With:   OT  PT  HH  RN       Long term SNF/Inpatient Rehab   x Independent/assisted living    Hospice    Other:     PATIENT CONDITION AT DISCHARGE:     Functional status    Poor     Deconditioned     Independent      Cognition     Lucid     Forgetful     Dementia      Catheters/lines (plus indication)    Olea     PICC     PEG     None      Code status     Full code     DNR      PHYSICAL EXAMINATION AT DISCHARGE:  Patient seen and examined at bedside, Condition stable, explained discharge and follow up plans. /76   Pulse 63   Temp 98.3 °F (36.8 °C)   Resp 16   Ht 5' 8\" (1.727 m)   Wt 148 kg (326 lb 4.5 oz)   SpO2 98%   BMI 49.61 kg/m²   General:  Alert, oriented, No acute distress. Comfortable, obese BW  Abdomen: soft, nontender. Resp:  No accessory muscle use, Good AE. Neuro:  Grossly normal, no focal neuro deficits, follows commands     CHRONIC MEDICAL DIAGNOSES:  Problem List as of 9/16/2021 Date Reviewed: 2/10/2018        Codes Class Noted - Resolved    Acute pancreatitis ICD-10-CM: K85.90  ICD-9-CM: 062.6  9/12/2021 - Present        HTN (hypertension), malignant ICD-10-CM: I10  ICD-9-CM: 401.0  2/8/2018 - Present              37 minutes were spent with the patient on counseling and coordination of care.     Signed:   Kilo Friend MD  9/16/2021  2:58 PM

## 2021-09-16 NOTE — PROGRESS NOTES
0730: Bedside shift change report given to Jodi Rutledge (oncoming nurse) by Lico Orr (offgoing nurse). Report included the following information SBAR, Kardex, Intake/Output, MAR, Recent Results and Cardiac Rhythm NSR     1654: I have reviewed discharge instructions with the patient. The patient verbalized understanding.

## 2021-09-16 NOTE — PROGRESS NOTES
Transitions of Care Plan:  RUR: 7%  Clinical Plan: BP management  Consults: General Surgery  Baseline: independent without DME; resides with family  Disposition: home with family once medically stable    Chart reviewed - patient with elevated BP yesterday afternoon.      Disposition:  Home with family once BP stabilized    Mayank Engle, [address]  Available via [address] [place] [address] or

## 2021-09-16 NOTE — PROGRESS NOTES
[employer] [facility]        Subjective     Feeling much better, minimal to no abdominal pain, tolerating clears    Objective     Patient Vitals for the past 24 hrs:   Temp Pulse Resp BP SpO2   09/16/21 1000  (!) 57      09/16/21 0916  63  (!) 144/77    09/16/21 0736 99 °F (37.2 °C) (!) 54 20 (!) 169/89 96 %   09/16/21 0606  (!) 53      09/16/21 0411  (!) 54      09/16/21 0300 99.3 °F (37.4 °C) (!) 54 22 (!) 164/65 98 %   09/16/21 0151 99.2 °F (37.3 °C) 60 20 (!) 172/83 98 %   09/15/21 2159  64      09/15/21 2010  (!) 101      09/15/21 1835  78      09/15/21 1653  74 25 (!) 148/80    09/15/21 1514  67  (!) 159/49    09/15/21 1508 98.9 °F (37.2 °C) 75 18 (!) 160/77 98 %   09/15/21 1416  68      09/15/21 1221 98.3 °F (36.8 °C) 74 17 (!) 119/41 98 %       Date 09/15/21 0700 - 09/16/21 0659 09/16/21 0700 - 09/17/21 0659   Shift 7994-7523 0162-9851 24 Hour Total 3365-7100 2539-2026 24 Hour Total   INTAKE   P.O. 840 120 960        P. O. 840 120 960      Shift Total(mL/kg) 840(5.7) 120(0.8) 960(6.5)      OUTPUT   Urine(mL/kg/hr) 1400(0.8) 700(0.4) 2100(0.6)        Urine Voided 6141 381 0528        Urine Occurrence(s) 1 x  1 x      Shift Total(mL/kg) 1400(9.5) 700(4.7) 4717(83.0)      NET -560 580 -1140      Weight (kg) 148 148 148 148 148 148       PE  Pulm - CTAB  CV - RRR  Abd - soft, ND, BS present, NTTP    Labs  Recent Results (from the past 12 hour(s))   CBC W/O DIFF    Collection Time: 09/16/21  3:05 AM   Result Value Ref Range    WBC 9.2 3.6 - 11.0 K/uL    RBC 4.20 3.80 - 5.20 M/uL    HGB 11.6 11.5 - 16.0 g/dL    HCT 36.7 35.0 - 47.0 %    MCV 87.4 80.0 - 99.0 FL    MCH 27.6 26.0 - 34.0 PG    MCHC 31.6 30.0 - 36.5 g/dL    RDW 14.4 11.5 - 14.5 %    PLATELET 065 527 - 000 K/uL    MPV 9.1 8.9 - 12.9 FL    NRBC 0.0 0  WBC    ABSOLUTE NRBC 0.00 0.00 - 0.01 K/uL   SAMPLES BEING HELD    Collection Time: 09/16/21  3:05 AM   Result Value Ref Range    SAMPLES BEING HELD 1sst     COMMENT        Add-on orders for these samples will be processed based on acceptable specimen integrity and analyte stability, which may vary by analyte. GLUCOSE, POC    Collection Time: 09/16/21  6:52 AM   Result Value Ref Range    Glucose (POC) 101 65 - 117 mg/dL    Performed by [address] Jesus Kumar is a 60 y. o.yr old female with pancreatitis and cholelithiasis    Plan     She can advance to a low fat diet today  I will set her up for laparoscopic cholecystectomy with cholangiogram as OP in 2 wks, my office will set this up  She can be DC later today or tomorrow if she does well with her PO intake    Leisa Man MD

## 2021-09-16 NOTE — DISCHARGE INSTRUCTIONS
Discharge Instructions       PATIENT ID: Kelvin Short  MRN: [id number]   YOB: 1965    DATE OF ADMISSION: 9/12/2021  4:12 PM    DATE OF DISCHARGE: 9/16/2021    PRIMARY CARE PROVIDER: Ashly Reddy MD     ATTENDING PHYSICIAN: Ni Moeller MD  DISCHARGING PROVIDER: Morena Bahena MD    To contact this individual call [phone] and ask the  to page. If unavailable ask to be transferred the Adult Hospitalist Department. DISCHARGE DIAGNOSES acute pancreatitis, gallstones. Hypertensive urgency. CONSULTATIONS: IP CONSULT TO GENERAL SURGERY    PROCEDURES/SURGERIES: * No surgery found *    FOLLOW UP APPOINTMENTS:   Follow-up Information     Follow up With Specialties Details Why Contact Info    Miriam Cardona DO Family Medicine On 9/29/2021 Hospital follow up new primary care appointment Wednesday, 9/29/21 at 8:00 a.m. Please arrive 15 minutes early with photo ID, insurance card and a listing of all medications. [place] [id number]  [phone]      Ashly Reddy MD Pulmonary Disease In 1 week  [address]      Darell Staley MD General Surgery, Bariatrics In 2 weeks  [address] [place] [address]  [phone]             ADDITIONAL CARE RECOMMENDATIONS: follow up with PCP and surgery 'for laparoscopic cholecystectomy'    DIET: Resume previous diet    DISCHARGE MEDICATIONS:  Amlodipine, hydralazine, metoprolol. Monitor BP closely. · It is important that you take the medication exactly as they are prescribed. · Keep your medication in the bottles provided by the pharmacist and keep a list of the medication names, dosages, and times to be taken in your wallet. · Do not take other medications without consulting your doctor. NOTIFY YOUR PHYSICIAN FOR ANY OF THE FOLLOWING:   Fever over 101 degrees for 24 hours.    Chest pain, shortness of breath, fever, chills, nausea, vomiting, diarrhea, change in mentation, falling, weakness, bleeding. Severe pain or pain not relieved by medications. Or, any other signs or symptoms that you may have questions about. It was our pleasure to help take care of you and we hope you get well very soon.     DISPOSITION:    Home With:   OT  PT  HH  RN       SNF/Inpatient Rehab/LTAC   x Independent/assisted living    Hospice    Other:     CDMP Checked:   Yes x     PROBLEM LIST Updated:  Yes x     Signed:   Cristian Pyle MD  9/16/2021  3:07 PM

## 2021-09-16 NOTE — PROGRESS NOTES
Charting and patient care of Sandro Wu by student orientee Serena Avalos from 4896 to 0730 was supervised and reviewed by this RN. 1930: BELIA Gutierres    0730: Bedside shift change report given to STEPHANIE De Souza (oncoming nurse) by Nola Gabriel RN (offgoing nurse). Report included the following information SBAR, Kardex, Intake/Output, MAR, Recent Results, Med Rec Status and Cardiac Rhythm  .

## 2021-09-17 LAB
ATRIAL RATE: 72 BPM
ATRIAL RATE: 88 BPM
CALCULATED P AXIS, ECG09: 58 DEGREES
CALCULATED P AXIS, ECG09: 62 DEGREES
CALCULATED R AXIS, ECG10: -6 DEGREES
CALCULATED R AXIS, ECG10: -7 DEGREES
CALCULATED T AXIS, ECG11: -16 DEGREES
CALCULATED T AXIS, ECG11: -171 DEGREES
DIAGNOSIS, 93000: NORMAL
DIAGNOSIS, 93000: NORMAL
P-R INTERVAL, ECG05: 124 MS
P-R INTERVAL, ECG05: 162 MS
Q-T INTERVAL, ECG07: 392 MS
Q-T INTERVAL, ECG07: 446 MS
QRS DURATION, ECG06: 84 MS
QRS DURATION, ECG06: 88 MS
QTC CALCULATION (BEZET), ECG08: 474 MS
QTC CALCULATION (BEZET), ECG08: 488 MS
VENTRICULAR RATE, ECG03: 72 BPM
VENTRICULAR RATE, ECG03: 88 BPM

## 2021-09-20 ENCOUNTER — TELEPHONE (OUTPATIENT)
Dept: SURGERY | Age: 56
End: 2021-09-20

## 2021-09-20 NOTE — TELEPHONE ENCOUNTER
Called patient to reschedule appointment with Loli Sandoval on 10/18 due to being out of office. Patient needs to be put on Chana's schedule for that week -PO visit.

## 2021-09-22 NOTE — TELEPHONE ENCOUNTER
Called  and the number on [facility]  Chart- Person who answered the phone stated we had the incorrect number. Patient needs to be seen for PO visit and can be put on Chana's sched if patient shows up to appt.

## 2021-09-28 ENCOUNTER — TRANSCRIBE ORDER (OUTPATIENT)
Dept: REGISTRATION | Age: 56
End: 2021-09-28

## 2021-09-28 DIAGNOSIS — Z01.812 ENCOUNTER FOR PREOPERATIVE SCREENING LABORATORY TESTING FOR COVID-19 VIRUS: Primary | ICD-10-CM

## 2021-09-28 DIAGNOSIS — Z20.822 ENCOUNTER FOR PREOPERATIVE SCREENING LABORATORY TESTING FOR COVID-19 VIRUS: Primary | ICD-10-CM

## 2021-09-29 ENCOUNTER — HOSPITAL ENCOUNTER (OUTPATIENT)
Dept: PREADMISSION TESTING | Age: 56
Discharge: HOME | End: 2021-09-29
Payer: COMMERCIAL

## 2021-09-29 DIAGNOSIS — Z01.812 ENCOUNTER FOR PREOPERATIVE SCREENING LABORATORY TESTING FOR COVID-19 VIRUS: ICD-10-CM

## 2021-09-29 DIAGNOSIS — Z20.822 ENCOUNTER FOR PREOPERATIVE SCREENING LABORATORY TESTING FOR COVID-19 VIRUS: ICD-10-CM

## 2021-09-29 PROCEDURE — U0005 INFEC AGEN DETEC AMPLI PROBE: HCPCS

## 2021-09-30 LAB
SARS-COV-2, XPLCVT: NOT DETECTED
SOURCE, COVRS: NORMAL

## 2021-10-03 ENCOUNTER — ANESTHESIA EVENT (OUTPATIENT)
Dept: SURGERY | Age: 56
End: 2021-10-03
Payer: COMMERCIAL

## 2021-10-04 ENCOUNTER — ANESTHESIA (OUTPATIENT)
Dept: SURGERY | Age: 56
End: 2021-10-04
Payer: COMMERCIAL

## 2021-10-04 ENCOUNTER — APPOINTMENT (OUTPATIENT)
Dept: GENERAL RADIOLOGY | Age: 56
End: 2021-10-04
Attending: SURGERY
Payer: COMMERCIAL

## 2021-10-04 ENCOUNTER — HOSPITAL ENCOUNTER (OUTPATIENT)
Age: 56
Setting detail: OUTPATIENT SURGERY
Discharge: HOME | End: 2021-10-04
Attending: SURGERY | Admitting: SURGERY
Payer: COMMERCIAL

## 2021-10-04 VITALS
OXYGEN SATURATION: 98 % | SYSTOLIC BLOOD PRESSURE: 154 MMHG | BODY MASS INDEX: 44.41 KG/M2 | DIASTOLIC BLOOD PRESSURE: 90 MMHG | RESPIRATION RATE: 15 BRPM | TEMPERATURE: 98.3 F | WEIGHT: 293 LBS | HEIGHT: 68 IN | HEART RATE: 60 BPM

## 2021-10-04 DIAGNOSIS — K85.10 GALLSTONE PANCREATITIS: Primary | ICD-10-CM

## 2021-10-04 PROCEDURE — 76210000001 HC OR PH I REC 2.5 TO 3 HR: Performed by: SURGERY

## 2021-10-04 PROCEDURE — 74011000250 HC RX REV CODE- 250: Performed by: SURGERY

## 2021-10-04 PROCEDURE — 76010000153 HC OR TIME 1.5 TO 2 HR: Performed by: SURGERY

## 2021-10-04 PROCEDURE — 77030008608 HC TRCR ENDOSC SMTH AMR -B: Performed by: SURGERY

## 2021-10-04 PROCEDURE — 77030040922 HC BLNKT HYPOTHRM STRY -A

## 2021-10-04 PROCEDURE — 74011000250 HC RX REV CODE- 250: Performed by: ANESTHESIOLOGY

## 2021-10-04 PROCEDURE — 77030013079 HC BLNKT BAIR HGGR 3M -A: Performed by: ANESTHESIOLOGY

## 2021-10-04 PROCEDURE — 77030012770 HC TRCR OPT FX AMR -B: Performed by: SURGERY

## 2021-10-04 PROCEDURE — 77030008771 HC TU NG SALEM SUMP -A: Performed by: ANESTHESIOLOGY

## 2021-10-04 PROCEDURE — 88304 TISSUE EXAM BY PATHOLOGIST: CPT

## 2021-10-04 PROCEDURE — 74011000250 HC RX REV CODE- 250: Performed by: NURSE ANESTHETIST, CERTIFIED REGISTERED

## 2021-10-04 PROCEDURE — 74011250636 HC RX REV CODE- 250/636: Performed by: ANESTHESIOLOGY

## 2021-10-04 PROCEDURE — 77030027138 HC INCENT SPIROMETER -A

## 2021-10-04 PROCEDURE — 74011000636 HC RX REV CODE- 636: Performed by: SURGERY

## 2021-10-04 PROCEDURE — 77030008756 HC TU IRR SUC STRY -B: Performed by: SURGERY

## 2021-10-04 PROCEDURE — 74011250636 HC RX REV CODE- 250/636: Performed by: SURGERY

## 2021-10-04 PROCEDURE — 77030020829: Performed by: SURGERY

## 2021-10-04 PROCEDURE — 77030008684 HC TU ET CUF COVD -B: Performed by: ANESTHESIOLOGY

## 2021-10-04 PROCEDURE — 77030010513 HC APPL CLP LIG J&J -C: Performed by: SURGERY

## 2021-10-04 PROCEDURE — 74300 X-RAY BILE DUCTS/PANCREAS: CPT | Performed by: SURGERY

## 2021-10-04 PROCEDURE — 47563 LAPARO CHOLECYSTECTOMY/GRAPH: CPT | Performed by: SURGERY

## 2021-10-04 PROCEDURE — 77030002933 HC SUT MCRYL J&J -A: Performed by: SURGERY

## 2021-10-04 PROCEDURE — 74011250636 HC RX REV CODE- 250/636: Performed by: NURSE ANESTHETIST, CERTIFIED REGISTERED

## 2021-10-04 PROCEDURE — 2709999900 HC NON-CHARGEABLE SUPPLY: Performed by: SURGERY

## 2021-10-04 PROCEDURE — 77030040361 HC SLV COMPR DVT MDII -B: Performed by: SURGERY

## 2021-10-04 PROCEDURE — 76060000035 HC ANESTHESIA 2 TO 2.5 HR: Performed by: SURGERY

## 2021-10-04 PROCEDURE — 74300 X-RAY BILE DUCTS/PANCREAS: CPT

## 2021-10-04 PROCEDURE — 77030020053 HC ELECTRD LAPSCP COVD -B: Performed by: SURGERY

## 2021-10-04 PROCEDURE — 77030010507 HC ADH SKN DERMBND J&J -B: Performed by: SURGERY

## 2021-10-04 PROCEDURE — 74011250637 HC RX REV CODE- 250/637: Performed by: NURSE ANESTHETIST, CERTIFIED REGISTERED

## 2021-10-04 PROCEDURE — 77030007955 HC PCH ENDOSC SPEC J&J -B: Performed by: SURGERY

## 2021-10-04 PROCEDURE — 77030031139 HC SUT VCRL2 J&J -A: Performed by: SURGERY

## 2021-10-04 PROCEDURE — 77030037032 HC INSRT SCIS CLICKLLINE DISP STOR -B: Performed by: SURGERY

## 2021-10-04 PROCEDURE — 76210000020 HC REC RM PH II FIRST 0.5 HR: Performed by: SURGERY

## 2021-10-04 PROCEDURE — 77030026438 HC STYL ET INTUB CARD -A: Performed by: ANESTHESIOLOGY

## 2021-10-04 PROCEDURE — 77030002895 HC DEV VASC CLOSR COVD -B: Performed by: SURGERY

## 2021-10-04 RX ORDER — PROPOFOL 10 MG/ML
INJECTION, EMULSION INTRAVENOUS AS NEEDED
Status: DISCONTINUED | OUTPATIENT
Start: 2021-10-04 | End: 2021-10-04 | Stop reason: HOSPADM

## 2021-10-04 RX ORDER — FENTANYL CITRATE 50 UG/ML
25 INJECTION, SOLUTION INTRAMUSCULAR; INTRAVENOUS
Status: DISCONTINUED | OUTPATIENT
Start: 2021-10-04 | End: 2021-10-04 | Stop reason: HOSPADM

## 2021-10-04 RX ORDER — SUCCINYLCHOLINE CHLORIDE 20 MG/ML
INJECTION INTRAMUSCULAR; INTRAVENOUS AS NEEDED
Status: DISCONTINUED | OUTPATIENT
Start: 2021-10-04 | End: 2021-10-04 | Stop reason: HOSPADM

## 2021-10-04 RX ORDER — FENTANYL CITRATE 50 UG/ML
50 INJECTION, SOLUTION INTRAMUSCULAR; INTRAVENOUS AS NEEDED
Status: DISCONTINUED | OUTPATIENT
Start: 2021-10-04 | End: 2021-10-04 | Stop reason: HOSPADM

## 2021-10-04 RX ORDER — KETOROLAC TROMETHAMINE 30 MG/ML
INJECTION, SOLUTION INTRAMUSCULAR; INTRAVENOUS AS NEEDED
Status: DISCONTINUED | OUTPATIENT
Start: 2021-10-04 | End: 2021-10-04 | Stop reason: HOSPADM

## 2021-10-04 RX ORDER — EPHEDRINE SULFATE/0.9% NACL/PF 50 MG/5 ML
SYRINGE (ML) INTRAVENOUS AS NEEDED
Status: DISCONTINUED | OUTPATIENT
Start: 2021-10-04 | End: 2021-10-04 | Stop reason: HOSPADM

## 2021-10-04 RX ORDER — TRAMADOL HYDROCHLORIDE 50 MG/1
50-100 TABLET ORAL
Qty: 30 TABLET | Refills: 0 | Status: SHIPPED | OUTPATIENT
Start: 2021-10-04 | End: 2021-10-11

## 2021-10-04 RX ORDER — MIDAZOLAM HYDROCHLORIDE 1 MG/ML
1 INJECTION, SOLUTION INTRAMUSCULAR; INTRAVENOUS
Status: DISCONTINUED | OUTPATIENT
Start: 2021-10-04 | End: 2021-10-04 | Stop reason: HOSPADM

## 2021-10-04 RX ORDER — ONDANSETRON 2 MG/ML
INJECTION INTRAMUSCULAR; INTRAVENOUS AS NEEDED
Status: DISCONTINUED | OUTPATIENT
Start: 2021-10-04 | End: 2021-10-04 | Stop reason: HOSPADM

## 2021-10-04 RX ORDER — SODIUM CHLORIDE 0.9 % (FLUSH) 0.9 %
5-40 SYRINGE (ML) INJECTION EVERY 8 HOURS
Status: DISCONTINUED | OUTPATIENT
Start: 2021-10-04 | End: 2021-10-04 | Stop reason: HOSPADM

## 2021-10-04 RX ORDER — ROCURONIUM BROMIDE 10 MG/ML
INJECTION, SOLUTION INTRAVENOUS AS NEEDED
Status: DISCONTINUED | OUTPATIENT
Start: 2021-10-04 | End: 2021-10-04 | Stop reason: HOSPADM

## 2021-10-04 RX ORDER — PHENYLEPHRINE HCL IN 0.9% NACL 0.4MG/10ML
SYRINGE (ML) INTRAVENOUS AS NEEDED
Status: DISCONTINUED | OUTPATIENT
Start: 2021-10-04 | End: 2021-10-04 | Stop reason: HOSPADM

## 2021-10-04 RX ORDER — IBUPROFEN 800 MG/1
800 TABLET ORAL
Qty: 30 TABLET | Refills: 0 | Status: SHIPPED | OUTPATIENT
Start: 2021-10-04 | End: 2021-10-25

## 2021-10-04 RX ORDER — MORPHINE SULFATE 2 MG/ML
2 INJECTION, SOLUTION INTRAMUSCULAR; INTRAVENOUS
Status: DISCONTINUED | OUTPATIENT
Start: 2021-10-04 | End: 2021-10-04 | Stop reason: HOSPADM

## 2021-10-04 RX ORDER — SODIUM CHLORIDE, SODIUM LACTATE, POTASSIUM CHLORIDE, CALCIUM CHLORIDE 600; 310; 30; 20 MG/100ML; MG/100ML; MG/100ML; MG/100ML
125 INJECTION, SOLUTION INTRAVENOUS CONTINUOUS
Status: DISCONTINUED | OUTPATIENT
Start: 2021-10-04 | End: 2021-10-04 | Stop reason: HOSPADM

## 2021-10-04 RX ORDER — DEXAMETHASONE SODIUM PHOSPHATE 4 MG/ML
INJECTION, SOLUTION INTRA-ARTICULAR; INTRALESIONAL; INTRAMUSCULAR; INTRAVENOUS; SOFT TISSUE AS NEEDED
Status: DISCONTINUED | OUTPATIENT
Start: 2021-10-04 | End: 2021-10-04 | Stop reason: HOSPADM

## 2021-10-04 RX ORDER — DEXTROSE, SODIUM CHLORIDE, SODIUM LACTATE, POTASSIUM CHLORIDE, AND CALCIUM CHLORIDE 5; .6; .31; .03; .02 G/100ML; G/100ML; G/100ML; G/100ML; G/100ML
125 INJECTION, SOLUTION INTRAVENOUS CONTINUOUS
Status: DISCONTINUED | OUTPATIENT
Start: 2021-10-04 | End: 2021-10-04 | Stop reason: HOSPADM

## 2021-10-04 RX ORDER — SODIUM CHLORIDE 0.9 % (FLUSH) 0.9 %
5-40 SYRINGE (ML) INJECTION AS NEEDED
Status: DISCONTINUED | OUTPATIENT
Start: 2021-10-04 | End: 2021-10-04 | Stop reason: HOSPADM

## 2021-10-04 RX ORDER — MIDAZOLAM HYDROCHLORIDE 1 MG/ML
1 INJECTION, SOLUTION INTRAMUSCULAR; INTRAVENOUS AS NEEDED
Status: DISCONTINUED | OUTPATIENT
Start: 2021-10-04 | End: 2021-10-04 | Stop reason: HOSPADM

## 2021-10-04 RX ORDER — ONDANSETRON 2 MG/ML
4 INJECTION INTRAMUSCULAR; INTRAVENOUS AS NEEDED
Status: DISCONTINUED | OUTPATIENT
Start: 2021-10-04 | End: 2021-10-04 | Stop reason: HOSPADM

## 2021-10-04 RX ORDER — LIDOCAINE HYDROCHLORIDE 20 MG/ML
INJECTION, SOLUTION EPIDURAL; INFILTRATION; INTRACAUDAL; PERINEURAL AS NEEDED
Status: DISCONTINUED | OUTPATIENT
Start: 2021-10-04 | End: 2021-10-04 | Stop reason: HOSPADM

## 2021-10-04 RX ORDER — ONDANSETRON 4 MG/1
4 TABLET, ORALLY DISINTEGRATING ORAL
Qty: 20 TABLET | Refills: 0 | Status: SHIPPED | OUTPATIENT
Start: 2021-10-04 | End: 2021-10-25

## 2021-10-04 RX ORDER — BUPIVACAINE HYDROCHLORIDE AND EPINEPHRINE 5; 5 MG/ML; UG/ML
INJECTION, SOLUTION EPIDURAL; INTRACAUDAL; PERINEURAL AS NEEDED
Status: DISCONTINUED | OUTPATIENT
Start: 2021-10-04 | End: 2021-10-04 | Stop reason: HOSPADM

## 2021-10-04 RX ORDER — FENTANYL CITRATE 50 UG/ML
INJECTION, SOLUTION INTRAMUSCULAR; INTRAVENOUS AS NEEDED
Status: DISCONTINUED | OUTPATIENT
Start: 2021-10-04 | End: 2021-10-04 | Stop reason: HOSPADM

## 2021-10-04 RX ORDER — MIDAZOLAM HYDROCHLORIDE 1 MG/ML
INJECTION, SOLUTION INTRAMUSCULAR; INTRAVENOUS AS NEEDED
Status: DISCONTINUED | OUTPATIENT
Start: 2021-10-04 | End: 2021-10-04 | Stop reason: HOSPADM

## 2021-10-04 RX ORDER — SCOLOPAMINE TRANSDERMAL SYSTEM 1 MG/1
PATCH, EXTENDED RELEASE TRANSDERMAL AS NEEDED
Status: DISCONTINUED | OUTPATIENT
Start: 2021-10-04 | End: 2021-10-04 | Stop reason: HOSPADM

## 2021-10-04 RX ORDER — DIPHENHYDRAMINE HYDROCHLORIDE 50 MG/ML
12.5 INJECTION, SOLUTION INTRAMUSCULAR; INTRAVENOUS AS NEEDED
Status: DISCONTINUED | OUTPATIENT
Start: 2021-10-04 | End: 2021-10-04 | Stop reason: HOSPADM

## 2021-10-04 RX ORDER — LIDOCAINE HYDROCHLORIDE 10 MG/ML
0.5 INJECTION, SOLUTION EPIDURAL; INFILTRATION; INTRACAUDAL; PERINEURAL AS NEEDED
Status: DISCONTINUED | OUTPATIENT
Start: 2021-10-04 | End: 2021-10-04 | Stop reason: HOSPADM

## 2021-10-04 RX ADMIN — SODIUM CHLORIDE, POTASSIUM CHLORIDE, SODIUM LACTATE AND CALCIUM CHLORIDE 125 ML/HR: 600; 310; 30; 20 INJECTION, SOLUTION INTRAVENOUS at 11:03

## 2021-10-04 RX ADMIN — DEXAMETHASONE SODIUM PHOSPHATE 10 MG: 4 INJECTION, SOLUTION INTRAMUSCULAR; INTRAVENOUS at 11:40

## 2021-10-04 RX ADMIN — LIDOCAINE HYDROCHLORIDE 100 MG: 20 INJECTION, SOLUTION EPIDURAL; INFILTRATION; INTRACAUDAL; PERINEURAL at 11:29

## 2021-10-04 RX ADMIN — Medication 80 MCG: at 11:43

## 2021-10-04 RX ADMIN — PROCHLORPERAZINE EDISYLATE 5 MG: 5 INJECTION INTRAMUSCULAR; INTRAVENOUS at 14:15

## 2021-10-04 RX ADMIN — FENTANYL CITRATE 50 MCG: 50 INJECTION, SOLUTION INTRAMUSCULAR; INTRAVENOUS at 12:56

## 2021-10-04 RX ADMIN — SUCCINYLCHOLINE CHLORIDE 200 MG: 20 INJECTION, SOLUTION INTRAMUSCULAR; INTRAVENOUS at 11:29

## 2021-10-04 RX ADMIN — PROPOFOL 200 MG: 10 INJECTION, EMULSION INTRAVENOUS at 11:29

## 2021-10-04 RX ADMIN — ONDANSETRON HYDROCHLORIDE 4 MG: 2 INJECTION, SOLUTION INTRAMUSCULAR; INTRAVENOUS at 13:10

## 2021-10-04 RX ADMIN — Medication 80 MCG: at 11:48

## 2021-10-04 RX ADMIN — MIDAZOLAM 2 MG: 1 INJECTION INTRAMUSCULAR; INTRAVENOUS at 11:16

## 2021-10-04 RX ADMIN — ONDANSETRON 4 MG: 2 INJECTION INTRAMUSCULAR; INTRAVENOUS at 13:55

## 2021-10-04 RX ADMIN — FENTANYL CITRATE 100 MCG: 50 INJECTION, SOLUTION INTRAMUSCULAR; INTRAVENOUS at 11:29

## 2021-10-04 RX ADMIN — SCOPALAMINE 1 PATCH: 1 PATCH, EXTENDED RELEASE TRANSDERMAL at 11:16

## 2021-10-04 RX ADMIN — SODIUM CHLORIDE 5 MG: 9 INJECTION INTRAMUSCULAR; INTRAVENOUS; SUBCUTANEOUS at 14:43

## 2021-10-04 RX ADMIN — Medication 40 MCG: at 11:34

## 2021-10-04 RX ADMIN — KETOROLAC TROMETHAMINE 30 MG: 30 INJECTION, SOLUTION INTRAMUSCULAR; INTRAVENOUS at 12:56

## 2021-10-04 RX ADMIN — Medication 80 MCG: at 11:44

## 2021-10-04 RX ADMIN — ROCURONIUM BROMIDE 10 MG: 10 SOLUTION INTRAVENOUS at 11:29

## 2021-10-04 RX ADMIN — MEPERIDINE HYDROCHLORIDE 25 MG: 50 INJECTION INTRAMUSCULAR; INTRAVENOUS; SUBCUTANEOUS at 12:51

## 2021-10-04 RX ADMIN — FENTANYL CITRATE 50 MCG: 50 INJECTION, SOLUTION INTRAMUSCULAR; INTRAVENOUS at 11:16

## 2021-10-04 RX ADMIN — SODIUM CHLORIDE, POTASSIUM CHLORIDE, SODIUM LACTATE AND CALCIUM CHLORIDE: 600; 310; 30; 20 INJECTION, SOLUTION INTRAVENOUS at 13:10

## 2021-10-04 RX ADMIN — Medication 80 MCG: at 11:35

## 2021-10-04 RX ADMIN — SUGAMMADEX 300 MG: 100 INJECTION, SOLUTION INTRAVENOUS at 12:47

## 2021-10-04 RX ADMIN — Medication 10 MG: at 11:46

## 2021-10-04 RX ADMIN — ONDANSETRON HYDROCHLORIDE 4 MG: 2 INJECTION, SOLUTION INTRAMUSCULAR; INTRAVENOUS at 12:45

## 2021-10-04 RX ADMIN — Medication 40 MCG: at 11:33

## 2021-10-04 RX ADMIN — PROPOFOL 25 MG: 10 INJECTION, EMULSION INTRAVENOUS at 12:52

## 2021-10-04 RX ADMIN — CEFAZOLIN 3 G: 1 INJECTION, POWDER, FOR SOLUTION INTRAMUSCULAR; INTRAVENOUS; PARENTERAL at 11:38

## 2021-10-04 RX ADMIN — ROCURONIUM BROMIDE 40 MG: 10 SOLUTION INTRAVENOUS at 11:37

## 2021-10-04 NOTE — ANESTHESIA PREPROCEDURE EVALUATION
Relevant Problems   CARDIOVASCULAR   (+) HTN (hypertension), malignant       Anesthetic History   No history of anesthetic complications            Review of Systems / Medical History  Patient summary reviewed, nursing notes reviewed and pertinent labs reviewed    Pulmonary  Within defined limits                 Neuro/Psych   Within defined limits           Cardiovascular  Within defined limits  Hypertension                   GI/Hepatic/Renal  Within defined limits              Endo/Other  Within defined limits      Morbid obesity     Other Findings              Physical Exam    Airway  Mallampati: II  TM Distance: > 6 cm  Neck ROM: normal range of motion   Mouth opening: Normal     Cardiovascular  Regular rate and rhythm,  S1 and S2 normal,  no murmur, click, rub, or gallop             Dental  No notable dental hx       Pulmonary  Breath sounds clear to auscultation               Abdominal  GI exam deferred       Other Findings            Anesthetic Plan    ASA: 3  Anesthesia type: general          Induction: Intravenous  Anesthetic plan and risks discussed with: Patient

## 2021-10-04 NOTE — PERIOP NOTES
Called contact, [address] Lauren Kumar @ [phone] to update and reviewed discharge instructions. No answer.    Left message to return call

## 2021-10-04 NOTE — H&P
[facility] History and Physical    History of Present Illness:      Brennon Hopkins is a 60 y.o. female who has recently had gallstone pancreatitis and is now ready for laparoscopic cholecystectomy to prevent another attack. She has been doing well    Past Medical History:   Diagnosis Date    HTN (hypertension)     Vertigo        No past surgical history on file.       Current Facility-Administered Medications:     lactated Ringers infusion, 125 mL/hr, IntraVENous, CONTINUOUS, Nakultad Pelletier MD    dextrose 5% lactated ringers infusion, 125 mL/hr, IntraVENous, CONTINUOUS, Nakul MD Prabhu    sodium chloride (NS) flush 5-40 mL, 5-40 mL, IntraVENous, Q8H, Nakul Prabhu, MD    sodium chloride (NS) flush 5-40 mL, 5-40 mL, IntraVENous, PRN, Nakul Pelletier MD    lidocaine (PF) (XYLOCAINE) 10 mg/mL (1 %) injection 0.5 mL, 0.5 mL, SubCUTAneous, PRN, Nakul Pelletier MD    fentaNYL citrate (PF) injection 50 mcg, 50 mcg, IntraVENous, PRN, Nakul Pelletier MD    midazolam (VERSED) injection 1 mg, 1 mg, IntraVENous, PRN, Nakul Prabhu, MD    midazolam (VERSED) injection 1 mg, 1 mg, IntraVENous, PRN, Nakul Pelletier MD    ceFAZolin (ANCEF) 3 g in 0.9%  ml IVPB, 3 g, IntraVENous, ONCE, Fely Yates MD    Allergies   Allergen Reactions    Claritin [Loratadine] Other (comments)     Tachycardia    Tylenol [Acetaminophen] Vertigo    Hydrocodone Hives    Oxycodone-Acetaminophen Anxiety     hallucinations       Social History     Socioeconomic History    Marital status: SINGLE     Spouse name: Not on file    Number of children: Not on file    Years of education: Not on file    Highest education level: Not on file   Occupational History    Not on file   Tobacco Use    Smoking status: Never Smoker    Smokeless tobacco: Never Used   Substance and Sexual Activity    Alcohol use: Yes     Comment: occasional    Drug use: No    Sexual activity: Not on file   Other Topics Concern    Not on file   Social History Narrative    Not on file     Social Determinants of Health     Financial Resource Strain:     Difficulty of Paying Living Expenses:    Food Insecurity:     Worried About Running Out of Food in the Last Year:     [address] in the Last Year:    Transportation Needs:     Lack of Transportation (Medical):  Lack of Transportation (Non-Medical):    Physical Activity:     Days of Exercise per Week:     Minutes of Exercise per Session:    Stress:     Feeling of Stress :    Social Connections:     Frequency of Communication with Friends and Family:     Frequency of Social Gatherings with Friends and Family:     Attends Synagogue Services:     Active Member of Clubs or Organizations:     Attends Club or Organization Meetings:     Marital Status:    Intimate Partner Violence:     Fear of Current or Ex-Partner:     Emotionally Abused:     Physically Abused:     Sexually Abused:        Family History   Problem Relation Age of Onset    No Known Problems Mother     No Known Problems Father        ROS   Constitutional: negative  Ears, Nose, Mouth, Throat, and Face: negative  Respiratory: negative  Cardiovascular: negative  Gastrointestinal: negative  Genitourinary:negative  Integument/Breast: negative  Hematologic/Lymphatic: negative  Behavioral/Psychiatric: negative  Allergic/Immunologic: negative      Physical Exam:     There were no vitals taken for this visit.     General - alert and oriented, no apparent distress  HEENT - no jaundice, no hearing imparement  Pulm - CTAB, no C/W/R  CV - RRR, no M/R/G  Abd - soft, ND, BS present, NTTP  Ext - pulses intact in UE and LE bilaterally, no edema  Skin - supple, no rashes  Psychiatric - normal affect, good mood    Labs  Lab Results   Component Value Date/Time    Sodium 137 09/15/2021 04:50 AM    Sodium 136 09/15/2021 04:50 AM    Potassium 3.2 (L) 09/15/2021 04:50 AM    Potassium 3.2 (L) 09/15/2021 04:50 AM    Chloride 104 09/15/2021 04:50 AM    Chloride 103 09/15/2021 04:50 AM    CO2 25 09/15/2021 04:50 AM    CO2 23 09/15/2021 04:50 AM    Anion gap 8 09/15/2021 04:50 AM    Anion gap 10 09/15/2021 04:50 AM    Glucose 116 (H) 09/15/2021 04:50 AM    Glucose 121 (H) 09/15/2021 04:50 AM    BUN 5 (L) 09/15/2021 04:50 AM    BUN 6 09/15/2021 04:50 AM    Creatinine 0.69 09/15/2021 04:50 AM    Creatinine 0.68 09/15/2021 04:50 AM    BUN/Creatinine ratio 7 (L) 09/15/2021 04:50 AM    BUN/Creatinine ratio 9 (L) 09/15/2021 04:50 AM    GFR est AA >60 09/15/2021 04:50 AM    GFR est AA >60 09/15/2021 04:50 AM    GFR est non-AA >60 09/15/2021 04:50 AM    GFR est non-AA >60 09/15/2021 04:50 AM    Calcium 9.0 09/15/2021 04:50 AM    Calcium 8.8 09/15/2021 04:50 AM    Bilirubin, total 0.4 09/15/2021 04:50 AM    Alk. phosphatase 75 09/15/2021 04:50 AM    Protein, total 8.5 (H) 09/15/2021 04:50 AM    Albumin 2.9 (L) 09/15/2021 04:50 AM    Globulin 5.6 (H) 09/15/2021 04:50 AM    A-G Ratio 0.5 (L) 09/15/2021 04:50 AM    ALT (SGPT) 20 09/15/2021 04:50 AM    AST (SGOT) 20 09/15/2021 04:50 AM     Lab Results   Component Value Date/Time    WBC 9.2 09/16/2021 03:05 AM    Hemoglobin (POC) 15.0 12/01/2010 06:25 PM    HGB 11.6 09/16/2021 03:05 AM    Hematocrit (POC) 44 12/01/2010 06:25 PM    HCT 36.7 09/16/2021 03:05 AM    PLATELET 711 62/92/8478 03:05 AM    MCV 87.4 09/16/2021 03:05 AM         Imaging  CT - FINDINGS:     GALLBLADDER: Contains immobile calculi. No gallbladder wall thickening or  pericholecystic fluid. No sonographic Gilbert's sign. COMMON DUCT: 5 mm in diameter. No visualized calculi. [place] LIVER: Normal echogenicity. No focal hepatic mass or intrahepatic biliary  dilatation is shown. MAIN PORTAL VEIN: Patent. Appropriate hepatopetal flow. PANCREAS: The visualized portions of the pancreas are normal.   RIGHT KIDNEY: 10.0 cm in length.  No hydronephrosis, shadowing calculus, or  contour-deforming renal mass.        IMPRESSION  Cholecystolithiasis, with no sonographic evidence of acute cholecystitis. I have reviewed and agree with all of the pertinent images    Assessment:     Annia Whitmore is a 60 y.o. female with gallstone pancreatitis    Recommendations:     1.   OR today for laparoscopic cholecystectomy with cholangiogram.  I have discussed the above procedure with the patient in detail. We reviewed the benefits and possible complications of the surgery which include bleeding, infection, damage to adjacent organs, venous thromboembolism, need for repeat surgery, death and other unforseen complications. The patient agreed to proceed with the surgery. Jean Villar MD    Greater than half of the time: 20 minutes was used in counciling the patient about diagnosis and treatment plan    Ms. Morales has a reminder for a \"due or due soon\" health maintenance. I have asked that she contact her primary care provider for follow-up on this health maintenance.

## 2021-10-04 NOTE — ANESTHESIA POSTPROCEDURE EVALUATION
Procedure(s):  LAPAROSCOPIC CHOLECYSTECTOMY WITH INTRAOPERATIVE ULTRASOUND. general    Anesthesia Post Evaluation        Patient location during evaluation: PACU  Patient participation: complete - patient participated  Level of consciousness: awake and alert  Pain management: adequate  Airway patency: patent  Anesthetic complications: no  Cardiovascular status: acceptable  Respiratory status: acceptable  Hydration status: acceptable  Comments: I have seen and evaluated the patient and is ready for discharge. Jacqui Muller MD    Post anesthesia nausea and vomiting:  none      INITIAL Post-op Vital signs:   Vitals Value Taken Time   /76 10/04/21 1500   Temp 36.8 °C (98.3 °F) 10/04/21 1409   Pulse 55 10/04/21 1508   Resp 18 10/04/21 1508   SpO2 98 % 10/04/21 1508   Vitals shown include unvalidated device data.

## 2021-10-04 NOTE — BRIEF OP NOTE
Brief Postoperative Note    Patient: Rose Mcgraw  YOB: 1965  MRN: [id number]    Date of Procedure: 10/4/2021     Pre-Op Diagnosis: PANCREATITIS  CHOLELITHIASIS    Post-Op Diagnosis: Same as preoperative diagnosis.       Procedure(s):  LAPAROSCOPIC CHOLECYSTECTOMY WITH INTRAOPERATIVE CHOLANGIOGRAM    Surgeon(s):  Maude Johnson MD    Surgical Assistant: Surg Asst-1: Consuelo Rosario    Anesthesia: General     Estimated Blood Loss (mL): less than 50     Complications: None    Specimens:   ID Type Source Tests Collected by Time Destination   1 : Gallbladder Fresh Abdomen  Maude Johnson MD 10/4/2021 1242 Pathology        Implants: * No implants in log *    Drains: * No LDAs found *    Findings: normal appearing GB with 1cm stone, normal cholangiogram    Electronically Signed by Tequila Walters MD on 10/4/2021 at 12:49 PM

## 2021-10-04 NOTE — DISCHARGE INSTRUCTIONS
Laparoscopic cholecystectomy      Patient Discharge Instructions    Teagan Simmsw / [id number] : 1965    Admitted 10/4/2021 Discharged: 10/4/2021       PATIENT INSTRUCTIONS  GALLBLADDER SURGERY  (CHOLECYSTECTOMY)    FOLLOW-UP:  Please make an appointment with your physician in 10 - 14 day(s). Call your physician immediately if you have any fevers greater than 101.5, drainage from your wound that is not clear or looks infected, persistent bleeding, increasing abdominal pain, problems urinating, or persistent nausea/vomiting. You should be aware that you may have right shoulder pain after surgery and that this will progressively go away. This is called 'referred pain' and is from the area of the gallbladder. It can also be caused by gas that may be trapped under the diaphragm from the surgery, especially if it was performed laparoscopically through mini-incisions. This gas will progressively get reabsorbed by your body. WOUND CARE INSTRUCTIONS:   You may shower at home. If clothing rubs against the wound or causes irritation and the wound is not draining you may cover it with a dry dressing during the daytime. Try to keep the wound dry and avoid ointments on the wound unless directed to do so. If the wound becomes bright red and painful or starts to drain infected material that is not clear, please contact your physician immediately. You should also call if you begin to drain fluid that is thin and greenish-brown from the wound and appears to look like bile. If the wound though is mildly pink and has a thick firm ridge underneath it, this is normal, and is referred to as a healing ridge. This will resolve over the next 4-6 weeks. Place an ice pack on the navel incision for the next 48 hours. After that, you may use a heating pad if you feel muscle tightening or pulling. DIET:  You may eat any foods that you can tolerate.   It is a good idea to eat a high fiber diet and take in plenty of fluids to prevent constipation. If you do become constipated you may want to take a mild laxative or take ducolax tablets on a daily basis until your bowel habits are regular. Constipation can be very uncomfortable, along with straining, after recent abdominal surgery. ACTIVITY:  You are encouraged to cough and deep breath or use your incentive spirometer if you were given one, every 15-30 minutes when awake. This will help prevent respiratory complications and low grade fevers post-operatively. You may want to hug a pillow when coughing and sneezing to add additional support to the surgical area(s) which will decrease pain during these times. You are encouraged to walk and engage in light activity for the next two weeks. You should not lift more than 20 pounds during this time frame as it could put you at increased risk for a post-operative hernia. Twenty pounds is roughly equivalent to a plastic bag of groceries. · Most people are able to return to work within 1 to 2 weeks after surgery. · You may shower 24 hours after surgery. Pat the cut (incision) dry. Do not take a bath for the first week. · Your doctor will tell you when you can have sex again. MEDICATIONS:  Try to take narcotic medications and anti-inflammatory medications, such as tylenol, ibuprofen, naprosyn, etc., with food. This will minimize stomach upset from the medication. Should you develop nausea and vomiting from the pain medication, or develop a rash, please discontinue the medication and contact your physician. You should not drive, make important decisions, or operate machinery when taking narcotic pain medication. · Take ibuprofen (Motrin) as scheduled then combine with tramadol (Ultram) as needed for severe pain. QUESTIONS:  Please feel free to call Dr. Arthea Ahumada office ([phone]) if you have any questions, and they will be glad to assist you. Follow-up with Dr. Mark Ortiz in 2 week(s).  Call the office to schedule your appointment. Information obtained by :    I understand that if any problems occur once I am at home I am to contact my physician. I understand and acknowledge receipt of the instructions indicated above. Physician's or R.N.'s Signature                                                                  Date/Time                                                                                                                                              Patient or Representative Signature                                                          Date/Time    Scopolamine (Absorbed through the skin)   Scopolamine ([id number])  Treat nausea and vomiting. Brand Name(s): Transderm Scop   There may be other brand names for this medicine. When This Medicine Should Not Be Used: You should not use this medicine if you have had an allergic reaction to scopolamine, or if you have narrow angle glaucoma. How to Use This Medicine:   Patch  · How to Store and Dispose of This Medicine:   · Store the patches at room temperature in a closed container, away from heat, moisture, and direct light. · Fold the used patch in half with the sticky sides together. Throw any used patch away so that children or pets cannot get to it. You will also need to throw away old patches after the expiration date has passed. · Keep all medicine out of the reach of children. Never share your medicine with anyone. Drugs and Foods to Avoid:   Ask your doctor or pharmacist before using any other medicine, including over-the-counter medicines, vitamins, and herbal products. · Tell your doctor if you use anything else that makes you sleepy. Some examples are allergy medicine, narcotic pain medicine, and alcohol. · Do not drink alcohol while you are using this medicine.   Warnings While Using This Medicine: · Make sure your doctor knows if you are pregnant or breastfeeding, or if you have glaucoma, prostate problems, trouble urinating, blocked bowels, liver disease, kidney disease, or a history of seizures or mental illness. · This medicine can cause blurring of vision and other vision problems if it comes in contact with the eyes. This medicine may also cause problems with urination. If any of these reactions occur, remove the patch and call your doctor right away. · This medicine may make you dizzy or drowsy. Avoid driving, using machines, or doing anything else that could be dangerous if you are not alert. If you plan to participate in underwater sports, this medicine may cause disorienting effects. If this is a concern for you, talk with your doctor. · This medicine may make you sweat less and cause your body to get too hot. Be careful in hot weather, when you are exercising, or if using a sauna or whirlpool. · Tell any doctor or dentist who treats you that you are using this medicine. This medicine may affect certain medical test results. · Skin burns have been reported at the patch site in several patients wearing an aluminized transdermal system during a magnetic resonance imaging scan (MRI). Because Transderm Sc?p® contains aluminum, it is recommended to remove the system before undergoing an MRI. Possible Side Effects While Using This Medicine:   Call your doctor right away if you notice any of these side effects:  · Allergic reaction: Itching or hives, swelling in your face or hands, swelling or tingling in your mouth or throat, chest tightness, trouble breathing  · Blurred vision. · Confusion or memory loss. · Fast, slow, or uneven heartbeat. · Lightheadedness, dizziness, drowsiness, or fainting. · Seeing, hearing, or feeling things that are not there. · Severe eye pain. · Trouble urinating. If you notice these less serious side effects, talk with your doctor:   · Dry mouth.   · Dry, itchy, or red eyes. · Restlessness. · Skin rash or redness. If you notice other side effects that you think are caused by this medicine, tell your doctor. Call your doctor for medical advice about side effects. You may report side effects to FDA at 9-515-FBA-6599  © 2017 [facility] Information is for End User's use only and may not be sold, redistributed or otherwise used for commercial purposes. The above information is an  only. It is not intended as medical advice for individual conditions or treatments. Talk to your doctor, nurse or pharmacist before following any medical regimen to see if it is safe and effective for you.             ______________________________________________________________________    Anesthesia Discharge Instructions    After general anesthesia or intervenous sedation, for 24 hours or while taking prescription Narcotics:  · Limit your activities  · Do not drive or operate hazardous machinery  · If you have not urinated within 8 hours after discharge, please contact your surgeon on call. · Do not make important personal or business decisions  · Do not drink alcoholic beverages    Report the following to your surgeon:  · Excessive pain, swelling, redness or odor of or around the surgical area  · Temperature over 100.5 degrees  · Nausea and vomiting lasting longer than 4 hours or if unable to take medication  · Any signs of decreased circulation or nerve impairment to extremity:  Change in color, persistent numbness, tingling, coldness or increased pain.   · Any questions

## 2021-10-05 NOTE — OP NOTES
[address]   OPERATIVE REPORT    Name:  Ramila Segura  MR#:  [id number]  :  1965  ACCOUNT #:  [de-identified]  DATE OF SERVICE:  10/04/2021      PREOPERATIVE DIAGNOSIS:  Gallstone pancreatitis. POSTOPERATIVE DIAGNOSIS:  Gallstone pancreatitis. PROCEDURE PERFORMED:  Laparoscopic cholecystectomy with intraoperative cholangiogram.    SURGEON:  Precious Gray MD    ASSISTANT:  Seth Dewey SA    ANESTHESIA:  General.    COMPLICATIONS:  None. SPECIMENS REMOVED:  Gallbladder. IMPLANTS:  None. ESTIMATED BLOOD LOSS:  Less than 50 mL. DRAINS:  None. FINDINGS:  Normal-appearing gallbladder with 1-cm stone, normal cholangiogram.    INDICATIONS FOR THE OPERATION:  The patient is a 73-year-old female who has a history of gallstone pancreatitis, who is needing laparoscopic cholecystectomy with cholangiogram.    DESCRIPTION OF THE OPERATION:  The patient was met in the preop holding area. The H and P was updated. Consent was signed. All risks and benefits were explained to the patient prior to the start of the operation. She was taken back to the operating room. She was lying in the supine position. The abdomen was prepped and draped in standard sterile fashion. Time-out was called. Antibiotics were given. SCDs were on lower extremities. Started the operation by making a 5-mm incision to the right upper quadrant, inserting a VisiPort trocar into the intra-abdominal cavity, insufflating to 15 mmHg. I then placed a 5-mm trocar superior to the umbilicus, a 77-BK subxiphoid port, and a 5-mm right lateral port. We then found the gallbladder, pushed up and over the liver, dissected down to the infundibulum of the gallbladder, dissecting free the cystic duct and cystic artery going into that area. We dissected out the cystic duct a bit further. We placed 2 clips on the cystic artery on the patient's side, 1 on the distal side and cut in between.   There was also a posterior branch that was also doubly clipped and divided as well. We then placed one clip on the infundibulum of the gallbladder, made a small incision into the cystic duct and inserted our cholangiogram catheter into the cystic duct and performed a cholangiogram.  The cholangiogram appeared to be normal.  There was no sign of any bile duct stones. The bile flowed freely into the duodenum and the intrahepatic bile ducts were all normal with a long cystic duct. We then had completed our cholangiogram, we removed the clip and the cholangiogram catheter, placing 3 clips on the patient's side of the cystic duct and divided it with scissors. We dissected up underneath the gallbladder little bit more and removed the gallbladder from the gallbladder fossa with hook cautery cauterizing any bleeding from liver bed along the way. There was also a possible duct of Luschka which we doubly clipped as well as we are removing the gallbladder and removed the gallbladder from an Endo Catch bag from the 12-mm port site. We could see one large 1-cm stone in the gallbladder. We passed it off the field for Pathology. Otherwise gallbladder looked pretty normal.  We then looked back into the gallbladder fossa, finding everything to be hemostatic. Our clips were all in place. We then closed our 12-mm trocar site fascial defect with an Endo Close suture passing device in interrupted figure-of-eight fashion with 0 Vicryl suture. We then desufflated the abdominal cavity, removed the trocars and closed the skin with 4-0 Monocryl and Dermabond to complete the operation. Dr. Ebony Solis was present and scrubbed during the entire operation. The counts were correct.         Chica Tran MD      [id number]  D:  10/04/2021 12:56  T:  10/04/2021 20:18  JOB #:  [id number]

## 2021-10-10 ENCOUNTER — APPOINTMENT (OUTPATIENT)
Dept: CT IMAGING | Age: 56
End: 2021-10-10
Attending: STUDENT IN AN ORGANIZED HEALTH CARE EDUCATION/TRAINING PROGRAM
Payer: COMMERCIAL

## 2021-10-10 ENCOUNTER — HOSPITAL ENCOUNTER (EMERGENCY)
Age: 56
Discharge: HOME | End: 2021-10-10
Attending: EMERGENCY MEDICINE
Payer: COMMERCIAL

## 2021-10-10 VITALS
DIASTOLIC BLOOD PRESSURE: 96 MMHG | SYSTOLIC BLOOD PRESSURE: 181 MMHG | TEMPERATURE: 98.3 F | HEART RATE: 62 BPM | RESPIRATION RATE: 16 BRPM | OXYGEN SATURATION: 98 %

## 2021-10-10 DIAGNOSIS — R11.2 NON-INTRACTABLE VOMITING WITH NAUSEA, UNSPECIFIED VOMITING TYPE: Primary | ICD-10-CM

## 2021-10-10 DIAGNOSIS — K59.00 CONSTIPATION, UNSPECIFIED CONSTIPATION TYPE: ICD-10-CM

## 2021-10-10 DIAGNOSIS — D35.00 ADRENAL ADENOMA, UNSPECIFIED LATERALITY: ICD-10-CM

## 2021-10-10 DIAGNOSIS — K42.9 UMBILICAL HERNIA WITHOUT OBSTRUCTION AND WITHOUT GANGRENE: ICD-10-CM

## 2021-10-10 DIAGNOSIS — D21.9 FIBROIDS: ICD-10-CM

## 2021-10-10 LAB
ALBUMIN SERPL-MCNC: 3.2 G/DL (ref 3.5–5)
ALBUMIN/GLOB SERPL: 0.6 {RATIO} (ref 1.1–2.2)
ALP SERPL-CCNC: 77 U/L (ref 45–117)
ALT SERPL-CCNC: 25 U/L (ref 12–78)
ANION GAP SERPL CALC-SCNC: 5 MMOL/L (ref 5–15)
AST SERPL-CCNC: 38 U/L (ref 15–37)
BASOPHILS # BLD: 0 K/UL (ref 0–0.1)
BASOPHILS NFR BLD: 0 % (ref 0–1)
BILIRUB SERPL-MCNC: 0.5 MG/DL (ref 0.2–1)
BUN SERPL-MCNC: 11 MG/DL (ref 6–20)
BUN/CREAT SERPL: 13 (ref 12–20)
CALCIUM SERPL-MCNC: 9.4 MG/DL (ref 8.5–10.1)
CHLORIDE SERPL-SCNC: 102 MMOL/L (ref 97–108)
CO2 SERPL-SCNC: 28 MMOL/L (ref 21–32)
COMMENT, HOLDF: NORMAL
CREAT SERPL-MCNC: 0.87 MG/DL (ref 0.55–1.02)
DIFFERENTIAL METHOD BLD: ABNORMAL
EOSINOPHIL # BLD: 0 K/UL (ref 0–0.4)
EOSINOPHIL NFR BLD: 0 % (ref 0–7)
ERYTHROCYTE [DISTWIDTH] IN BLOOD BY AUTOMATED COUNT: 14.7 % (ref 11.5–14.5)
GLOBULIN SER CALC-MCNC: 5.4 G/DL (ref 2–4)
GLUCOSE SERPL-MCNC: 101 MG/DL (ref 65–100)
HCT VFR BLD AUTO: 39.1 % (ref 35–47)
HGB BLD-MCNC: 12.3 G/DL (ref 11.5–16)
IMM GRANULOCYTES # BLD AUTO: 0 K/UL (ref 0–0.04)
IMM GRANULOCYTES NFR BLD AUTO: 0 % (ref 0–0.5)
LACTATE SERPL-SCNC: 0.4 MMOL/L (ref 0.4–2)
LIPASE SERPL-CCNC: 69 U/L (ref 73–393)
LYMPHOCYTES # BLD: 1.5 K/UL (ref 0.8–3.5)
LYMPHOCYTES NFR BLD: 16 % (ref 12–49)
MCH RBC QN AUTO: 27.8 PG (ref 26–34)
MCHC RBC AUTO-ENTMCNC: 31.5 G/DL (ref 30–36.5)
MCV RBC AUTO: 88.3 FL (ref 80–99)
MONOCYTES # BLD: 0.5 K/UL (ref 0–1)
MONOCYTES NFR BLD: 6 % (ref 5–13)
NEUTS SEG # BLD: 7.1 K/UL (ref 1.8–8)
NEUTS SEG NFR BLD: 78 % (ref 32–75)
NRBC # BLD: 0 K/UL (ref 0–0.01)
NRBC BLD-RTO: 0 PER 100 WBC
PLATELET # BLD AUTO: 319 K/UL (ref 150–400)
PMV BLD AUTO: 8.7 FL (ref 8.9–12.9)
POTASSIUM SERPL-SCNC: 4.5 MMOL/L (ref 3.5–5.1)
PROT SERPL-MCNC: 8.6 G/DL (ref 6.4–8.2)
RBC # BLD AUTO: 4.43 M/UL (ref 3.8–5.2)
SAMPLES BEING HELD,HOLD: NORMAL
SODIUM SERPL-SCNC: 135 MMOL/L (ref 136–145)
WBC # BLD AUTO: 9.2 K/UL (ref 3.6–11)

## 2021-10-10 PROCEDURE — 83690 ASSAY OF LIPASE: CPT

## 2021-10-10 PROCEDURE — 96374 THER/PROPH/DIAG INJ IV PUSH: CPT

## 2021-10-10 PROCEDURE — 99282 EMERGENCY DEPT VISIT SF MDM: CPT

## 2021-10-10 PROCEDURE — 74011000636 HC RX REV CODE- 636: Performed by: RADIOLOGY

## 2021-10-10 PROCEDURE — 83605 ASSAY OF LACTIC ACID: CPT

## 2021-10-10 PROCEDURE — 85025 COMPLETE CBC W/AUTO DIFF WBC: CPT

## 2021-10-10 PROCEDURE — 74177 CT ABD & PELVIS W/CONTRAST: CPT

## 2021-10-10 PROCEDURE — 80053 COMPREHEN METABOLIC PANEL: CPT

## 2021-10-10 PROCEDURE — 74011250636 HC RX REV CODE- 250/636: Performed by: STUDENT IN AN ORGANIZED HEALTH CARE EDUCATION/TRAINING PROGRAM

## 2021-10-10 RX ORDER — ONDANSETRON 2 MG/ML
4 INJECTION INTRAMUSCULAR; INTRAVENOUS
Status: COMPLETED | OUTPATIENT
Start: 2021-10-10 | End: 2021-10-10

## 2021-10-10 RX ORDER — ONDANSETRON HYDROCHLORIDE 8 MG/1
8 TABLET, FILM COATED ORAL
Qty: 15 TABLET | Refills: 0 | Status: SHIPPED | OUTPATIENT
Start: 2021-10-10 | End: 2021-10-25

## 2021-10-10 RX ADMIN — ONDANSETRON 4 MG: 2 INJECTION INTRAMUSCULAR; INTRAVENOUS at 15:33

## 2021-10-10 RX ADMIN — IOPAMIDOL 100 ML: 755 INJECTION, SOLUTION INTRAVENOUS at 17:23

## 2021-10-10 NOTE — ED NOTES
6:34 PM  Change of shift. Care of patient taken over from [facility]; H&P reviewed, handoff complete. Awaiting CT scan.     7:40 PM   Followed up with [facility] RN team, CT not done. They are following up with patient and radiology. Delays in care 2/2 IV placement issues. No post operative surgical findings on CT scan. Pt made aware of incidental CT scan findings. Discussed dietary modifications to assist w/ s/sx management. Discussed medication management at home. To call Dr. Laurie Figueroa office in AM for follow up. LABORATORY TESTS:  Recent Results (from the past 12 hour(s))   SAMPLES BEING HELD    Collection Time: 10/10/21  3:28 PM   Result Value Ref Range    SAMPLES BEING HELD 1red     COMMENT        Add-on orders for these samples will be processed based on acceptable specimen integrity and analyte stability, which may vary by analyte. CBC WITH AUTOMATED DIFF    Collection Time: 10/10/21  3:28 PM   Result Value Ref Range    WBC 9.2 3.6 - 11.0 K/uL    RBC 4.43 3.80 - 5.20 M/uL    HGB 12.3 11.5 - 16.0 g/dL    HCT 39.1 35.0 - 47.0 %    MCV 88.3 80.0 - 99.0 FL    MCH 27.8 26.0 - 34.0 PG    MCHC 31.5 30.0 - 36.5 g/dL    RDW 14.7 (H) 11.5 - 14.5 %    PLATELET 606 400 - 472 K/uL    MPV 8.7 (L) 8.9 - 12.9 FL    NRBC 0.0 0  WBC    ABSOLUTE NRBC 0.00 0.00 - 0.01 K/uL    NEUTROPHILS 78 (H) 32 - 75 %    LYMPHOCYTES 16 12 - 49 %    MONOCYTES 6 5 - 13 %    EOSINOPHILS 0 0 - 7 %    BASOPHILS 0 0 - 1 %    IMMATURE GRANULOCYTES 0 0.0 - 0.5 %    ABS. NEUTROPHILS 7.1 1.8 - 8.0 K/UL    ABS. LYMPHOCYTES 1.5 0.8 - 3.5 K/UL    ABS. MONOCYTES 0.5 0.0 - 1.0 K/UL    ABS. EOSINOPHILS 0.0 0.0 - 0.4 K/UL    ABS. BASOPHILS 0.0 0.0 - 0.1 K/UL    ABS. IMM.  GRANS. 0.0 0.00 - 0.04 K/UL    DF AUTOMATED     METABOLIC PANEL, COMPREHENSIVE    Collection Time: 10/10/21  3:28 PM   Result Value Ref Range    Sodium 135 (L) 136 - 145 mmol/L    Potassium 4.5 3.5 - 5.1 mmol/L    Chloride 102 97 - 108 mmol/L    CO2 28 21 - 32 mmol/L    Anion gap 5 5 - 15 mmol/L    Glucose 101 (H) 65 - 100 mg/dL    BUN 11 6 - 20 MG/DL    Creatinine 0.87 0.55 - 1.02 MG/DL    BUN/Creatinine ratio 13 12 - 20      GFR est AA >60 >60 ml/min/1.73m2    GFR est non-AA >60 >60 ml/min/1.73m2    Calcium 9.4 8.5 - 10.1 MG/DL    Bilirubin, total 0.5 0.2 - 1.0 MG/DL    ALT (SGPT) 25 12 - 78 U/L    AST (SGOT) 38 (H) 15 - 37 U/L    Alk. phosphatase 77 45 - 117 U/L    Protein, total 8.6 (H) 6.4 - 8.2 g/dL    Albumin 3.2 (L) 3.5 - 5.0 g/dL    Globulin 5.4 (H) 2.0 - 4.0 g/dL    A-G Ratio 0.6 (L) 1.1 - 2.2     LIPASE    Collection Time: 10/10/21  3:28 PM   Result Value Ref Range    Lipase 69 (L) 73 - 393 U/L   LACTIC ACID    Collection Time: 10/10/21  3:28 PM   Result Value Ref Range    Lactic acid 0.4 0.4 - 2.0 MMOL/L       IMAGING RESULTS:  CT ABD PELV W CONT   Final Result   Status post cholecystectomy with no unusual postprocedural finding or acute   process identified. Please see above for additional nonemergent incidental findings. MEDICATIONS GIVEN:  Medications   ondansetron (ZOFRAN) injection 4 mg (4 mg IntraVENous Given 10/10/21 1533)   iopamidoL (ISOVUE-370) 76 % injection 100 mL (100 mL IntraVENous Given 10/10/21 1723)       IMPRESSION:  1. Non-intractable vomiting with nausea, unspecified vomiting type    2. Fibroids    3. Umbilical hernia without obstruction and without gangrene    4. Constipation, unspecified constipation type    5. Adrenal adenoma, unspecified laterality        PLAN:  1. Discharge Medication List as of 10/10/2021  9:33 PM        START taking these medications    Details   ondansetron hcl (Zofran) 8 mg tablet Take 1 Tablet by mouth every eight (8) hours as needed for Nausea or Vomiting., Normal, Disp-15 Tablet, R-0           CONTINUE these medications which have NOT CHANGED    Details   traMADoL (ULTRAM) 50 mg tablet Take 1-2 Tablets by mouth every six (6) hours as needed for Pain for up to 7 days.  Max Daily Amount: 400 mg., Normal, Disp-30 Tablet, R-0      ondansetron (ZOFRAN ODT) 4 mg disintegrating tablet Take 1 Tablet by mouth every eight (8) hours as needed for Nausea or Vomiting., Normal, Disp-20 Tablet, R-0      ibuprofen (MOTRIN) 800 mg tablet Take 1 Tablet by mouth every six (6) hours as needed for Pain., Normal, Disp-30 Tablet, R-0      amLODIPine (NORVASC) 10 mg tablet Take 1 Tablet by mouth daily for 30 days. , Print, Disp-30 Tablet, R-0      hydrALAZINE (APRESOLINE) 50 mg tablet Take 1 Tablet by mouth three (3) times daily for 30 days. , Print, Disp-90 Tablet, R-0      metoprolol succinate (TOPROL-XL) 50 mg XL tablet Take 1 Tablet by mouth daily for 30 days. , Print, Disp-30 Tablet, R-0      pseudoephedrine CR (Sudafed 12 Hour) 120 mg CR tablet Take 120 mg by mouth daily as needed for Congestion. , Historical Med      therapeutic multivitamin (THERAGRAN) tablet Take 1 Tablet by mouth daily. , Historical Med      diphenhydrAMINE (BENADRYL) 25 mg tablet Take 25 mg by mouth every six (6) hours as needed for Congestion or Allergies. , Historical Med      Aspirin-Acetaminophen-Caffeine (Goody's Extra Strength) 500-325-65 mg pwpk Take  by mouth daily as needed for Pain or Other (Headache). , Historical Med      naproxen sodium (Aleve) 220 mg tablet Take 220 mg by mouth daily as needed for Pain or Other (Headache). , Historical Med      L-desoxyephedrine (VICKS VAPOR INHALER NA) by Nasal route as needed for Other (Congestion). , Historical Med      diclofenac (VOLTAREN) 1 % gel Apply  to affected area two (2) times daily as needed for Pain., Historical Med           2. Follow-up Information       Follow up With Specialties Details Why Contact Info    Vesta Hall MD General Surgery, Bariatrics Schedule an appointment as soon as possible for a visit   [facility]  [address] [address] [phone]      [address] [facility] Emergency Medicine Go to  If symptoms worsen [address] [facility]  [phone]          3.  Return to ED if worse

## 2021-10-10 NOTE — ED PROVIDER NOTES
Patient is a 60year old female with history of HTN, vertigo and recent cholecystectomy 10/4/21 by Dr. Collin Mcnulty who presents to ED c/o nausea and vomiting which started 3 days prior. Patient reports immediately following the procedure she was doing well, and tolerating solids and liquids. Patient reports she develops nausea immediately after eating and reports multiple episodes of NBNB emesis. Reports 3-4 episodes today. Reports she has been eating chicken brocolli casserole this week and last tried eating oatmeal this morning. She has been trying to eat bland foods this week. Reports slight abdominal pain secondary to throwing up. Reports post op pain at incision site is improving. Patient called and spoke to Dr. Nate Dawkins today who advised patient to come in and be seen. Patient denies fever, chills, diarrhea, constipation, chest pain, palpitations, shortness of breath, cough, urinary symptoms. Past Medical History:   Diagnosis Date    HTN (hypertension)     Vertigo        No past surgical history on file.       Family History:   Problem Relation Age of Onset    No Known Problems Mother     No Known Problems Father        Social History     Socioeconomic History    Marital status: SINGLE     Spouse name: Not on file    Number of children: Not on file    Years of education: Not on file    Highest education level: Not on file   Occupational History    Not on file   Tobacco Use    Smoking status: Never Smoker    Smokeless tobacco: Never Used   Substance and Sexual Activity    Alcohol use: Yes     Comment: occasional    Drug use: No    Sexual activity: Not on file   Other Topics Concern    Not on file   Social History Narrative    Not on file     Social Determinants of Health     Financial Resource Strain:     Difficulty of Paying Living Expenses:    Food Insecurity:     Worried About Running Out of Food in the Last Year:     [address] in the Last Year:    Transportation Needs:     Lack of Transportation (Medical):  Lack of Transportation (Non-Medical):    Physical Activity:     Days of Exercise per Week:     Minutes of Exercise per Session:    Stress:     Feeling of Stress :    Social Connections:     Frequency of Communication with Friends and Family:     Frequency of Social Gatherings with Friends and Family:     Attends [employer] Services:     Active Member of Clubs or Organizations:     Attends Club or Organization Meetings:     Marital Status:    Intimate Partner Violence:     Fear of Current or Ex-Partner:     Emotionally Abused:     Physically Abused:     Sexually Abused: ALLERGIES: Claritin [loratadine], Tylenol [acetaminophen], Hydrocodone, and Oxycodone-acetaminophen    Review of Systems   Constitutional: Negative for activity change, appetite change, chills and fever. HENT: Negative for congestion and sore throat. Eyes: Negative for pain and visual disturbance. Respiratory: Negative for cough and shortness of breath. Cardiovascular: Negative for chest pain, palpitations and leg swelling. Gastrointestinal: Positive for abdominal pain, nausea and vomiting. Negative for abdominal distention, constipation and diarrhea. Genitourinary: Negative for decreased urine volume, dysuria, flank pain, frequency and urgency. Musculoskeletal: Negative for back pain and neck pain. Skin: Negative for rash and wound. Allergic/Immunologic: Negative for immunocompromised state. Neurological: Negative for dizziness, syncope, weakness, light-headedness, numbness and headaches. Psychiatric/Behavioral: Negative for confusion. All other systems reviewed and are negative. Vitals:    10/10/21 1432   BP: (!) 181/96   Pulse: 62   Resp: 16   Temp: 98.3 °F (36.8 °C)   SpO2: 98%            Physical Exam  Vitals and nursing note reviewed. Constitutional:       General: She is not in acute distress. Appearance: Normal appearance. She is well-developed.  She is not toxic-appearing. HENT:      Head: Normocephalic and atraumatic. Nose: Nose normal.      Mouth/Throat:      Mouth: Mucous membranes are moist.   Eyes:      General: Lids are normal.      Extraocular Movements: Extraocular movements intact. Conjunctiva/sclera: Conjunctivae normal.   Cardiovascular:      Rate and Rhythm: Normal rate and regular rhythm. Pulses: Normal pulses. Heart sounds: Normal heart sounds, S1 normal and S2 normal.   Pulmonary:      Effort: Pulmonary effort is normal. No accessory muscle usage. Breath sounds: Normal breath sounds. Abdominal:      General: Abdomen is protuberant. Palpations: Abdomen is soft. Tenderness: There is abdominal tenderness in the periumbilical area and left upper quadrant. There is no right CVA tenderness, left CVA tenderness, guarding or rebound. Comments: Incision sites are well healed with dermabond in place. No surrounding erythema, warmth, tenderness or drainage. Musculoskeletal:         General: Normal range of motion. Cervical back: Normal range of motion and neck supple. Skin:     General: Skin is warm and dry. Capillary Refill: Capillary refill takes less than 2 seconds. Neurological:      General: No focal deficit present. Mental Status: She is alert and oriented to person, place, and time. Mental status is at baseline. Psychiatric:         Attention and Perception: Attention normal.         Mood and Affect: Mood and affect normal.         Speech: Speech normal.         Behavior: Behavior is cooperative. Thought Content: Thought content normal.         Cognition and Memory: Cognition normal.         Judgment: Judgment normal.          MDM  Number of Diagnoses or Management Options  Diagnosis management comments: Patient with nausea and vomiting x3 days s/p cholecystectomy 10/4. Minimal abdominal tenderness noted. CBC unremarkable. CMP unremarkable. No elevated of WBC count or lipase. Patient reports sx improved after dose of zofran. Consulted general surgery and spoke with Dr. Linh Baez who advised if patient is able to tolerate PO and CT negative, patient can be discharged home with zofran and outpatient follow-up with Dr. Kathleen Callahan.         Amount and/or Complexity of Data Reviewed  Clinical lab tests: reviewed  Tests in the radiology section of CPT®: reviewed  Review and summarize past medical records: yes  Discuss the patient with other providers: yes (Dr. Jazmin Pagan, ED Attending )           Procedures

## 2021-10-10 NOTE — ED TRIAGE NOTES
Triage: Pt arrives ambulatory from home wit CC of vomiting. She had her gallbladder out on Monday. She denies any new or severe pain where her gall bladder was. She has been having BMs and passing flatus. She called the office and they referred her to the emergency room.

## 2021-10-11 ENCOUNTER — TELEPHONE (OUTPATIENT)
Dept: SURGERY | Age: 56
End: 2021-10-11

## 2021-10-11 NOTE — TELEPHONE ENCOUNTER
Returned call to [facility] verified all PHI. Reviewed notes patent s/p CHOLECYSTECTOMY WITH INTRAOPERATIVE CHOLANGIOGRAM on 10/4/21. Patient seen in ER yesterday with c/o nausea and vomiting which started 3 days prior. She stated the nausea is a little better today. She is tolerating liquids. She last had a BM this past Thursday or Friday. She states her stomach feels empty from the N/V. She has dulcolax for her bowels. [facility] is asking what can she eat? I advised to avoid high fat foods, fried greasy foods. Avoid any caffeine or carbonated beverages. I suggest try eating bland foods for the next few days rest stomach. Slowly reintroduce solid foods. Once she feels better,try eating small meals 5-6 meals a day low in fat and protein rich. I advised to call the office if there are any other issues. Post op appointment with Renato Ellis NP on 10/18/21 was cancelled. I advised to call the  to discuss appointment.

## 2021-10-11 NOTE — ED NOTES
Provider reviewed discharge instructions with the patient. The patient verbalized understanding. Pt left ED ambulatory with discharge papers in hand.

## 2021-10-25 ENCOUNTER — OFFICE VISIT (OUTPATIENT)
Dept: SURGERY | Age: 56
End: 2021-10-25
Payer: COMMERCIAL

## 2021-10-25 VITALS
BODY MASS INDEX: 44.41 KG/M2 | HEIGHT: 68 IN | DIASTOLIC BLOOD PRESSURE: 88 MMHG | TEMPERATURE: 98 F | RESPIRATION RATE: 18 BRPM | HEART RATE: 70 BPM | WEIGHT: 293 LBS | SYSTOLIC BLOOD PRESSURE: 162 MMHG | OXYGEN SATURATION: 97 %

## 2021-10-25 DIAGNOSIS — Z09 FOLLOW-UP EXAMINATION AFTER ABDOMINAL SURGERY: Primary | ICD-10-CM

## 2021-10-25 DIAGNOSIS — N30.00 ACUTE CYSTITIS WITHOUT HEMATURIA: ICD-10-CM

## 2021-10-25 PROCEDURE — 99024 POSTOP FOLLOW-UP VISIT: CPT | Performed by: NURSE PRACTITIONER

## 2021-10-25 RX ORDER — HYDRALAZINE HYDROCHLORIDE 10 MG/1
50 TABLET, FILM COATED ORAL 3 TIMES DAILY
COMMUNITY
End: 2022-05-02

## 2021-10-25 RX ORDER — METOPROLOL TARTRATE 25 MG/1
50 TABLET, FILM COATED ORAL DAILY
COMMUNITY
End: 2022-03-28

## 2021-10-25 RX ORDER — SULFAMETHOXAZOLE AND TRIMETHOPRIM 800; 160 MG/1; MG/1
1 TABLET ORAL 2 TIMES DAILY
Qty: 6 TABLET | Refills: 0 | Status: SHIPPED | OUTPATIENT
Start: 2021-10-25 | End: 2021-10-28

## 2021-10-25 RX ORDER — AMLODIPINE BESYLATE 10 MG/1
10 TABLET ORAL DAILY
COMMUNITY
End: 2022-05-02

## 2021-10-25 NOTE — LETTER
NOTIFICATION OF RETURN TO WORK / SCHOOL    10/25/2021 3:42 PM    Ms. RUSSELL [facility]  [address]  [address] [facility]  . To Whom It May Concern:    Rosey Mitchell was under the care of Lucy Nicolas from 9/12/21 to present. She will be able to return to work/school on 10/27/21 with regular duties and/or activities. If there are questions or concerns please have the patient contact our office.     Sincerely,      Velva Prader, NP

## 2021-10-25 NOTE — PROGRESS NOTES
1. Have you been to the ER, urgent care clinic since your last visit? Hospitalized since your last visit? Yes, ED on 10/10/21 for nausea/vomiting. States problem has resolved     2. Have you seen or consulted any other health care providers outside of the [address] [place] [address] since your last visit? Include any pap smears or colon screening. no      BP high today. 164/90 initial check, 162/88 manual recheck. States she ran out of BP pills several days ago. Does not have appointment with PCP until beginning of Dec.  Advised to contact PCP for refill. States note needed to return to work this Wed.  10/27/21

## 2021-10-25 NOTE — PATIENT INSTRUCTIONS
For UTI:    Take the bactrim 1 tab 2x/ day for 3 days     Drink lots of water (4-6 bottles every day)     Cranberry juice 1-2 glasses per day     Urinate when you have the urge     Avoid tub bath (ok to shower)     Urinate after intercourse     Recommendations after abdominal surgery:    -  Avoid heavy lifting and or straining anything > about 15 lbs for another 2 weeks     -  Avoid abdominal exercises for another 2 weeks     -  Daily walking and resuming your normal day to day activities is encouraged and as tolerated      -  Ok to moisturize the incisions as desired              Low-Fat Diet for Gallbladder Disease: Care Instructions  Overview     When you eat, the gallbladder releases bile, which helps you digest the fat in food. If you have an inflamed gallbladder, this may cause pain. A low-fat diet may give your gallbladder a rest so you can start to heal. Your doctor and dietitian can help you make an eating plan that does not irritate your digestive system. Always talk with your doctor or dietitian before you make changes in your diet. Follow-up care is a key part of your treatment and safety. Be sure to make and go to all appointments, and call your doctor if you are having problems. It's also a good idea to know your test results and keep a list of the medicines you take. How can you care for yourself at home? · Eat many small meals and snacks each day instead of three large meals. · Choose lean meats. ? Eat no more than 5 to 6½ ounces of meat a day. ? Cut off all fat you can see. ? Eat chicken and turkey without the skin. ? Many types of fish, such as salmon, lake trout, tuna, and herring, provide healthy omega-3 fat. But, avoid fish canned in oil, such as sardines in olive oil. ? Bake, broil, or grill meats, poultry, or fish instead of frying them in butter or fat. · Drink or eat nonfat or low-fat milk, yogurt, cheese, or other milk products each day. ?  Read the labels on cheeses, and choose those with less than 5 grams of fat an ounce. ? Try fat-free sour cream, cream cheese, or yogurt. ? Avoid cream soups and cream sauces on pasta. ? Eat low-fat ice cream, frozen yogurt, or sorbet. Avoid regular ice cream.  · Eat whole-grain cereals, breads, crackers, rice, or pasta. Avoid high-fat foods such as croissants, scones, biscuits, waffles, doughnuts, muffins, granola, and high-fat breads. · Flavor your foods with herbs and spices (such as basil, tarragon, or mint), fat-free sauces, or lemon juice instead of butter. You can also use butter substitutes, fat-free mayonnaise, or fat-free dressing. · Try applesauce, prune puree, or mashed bananas to replace some or all of the fat when you bake. · Limit fats and oils, such as butter, margarine, mayonnaise, and salad dressing, to no more than 1 tablespoon a meal.  · Avoid high-fat foods, such as:  ? Chocolate, whole milk, ice cream, and processed cheese. ? Fried or buttered foods. ? Sausage, salami, and shahid. ? Cinnamon rolls, cakes, pies, cookies, and other pastries. ? Prepared snack foods, such as potato chips, nut and granola bars, and mixed nuts. ? Coconut and avocado. · Learn how to read food labels for serving sizes and ingredients. Fast-food and convenience-food meals often have lots of fat. Where can you learn more? Go to http://www.gray.com/  Enter X081 in the search box to learn more about \"Low-Fat Diet for Gallbladder Disease: Care Instructions. \"  Current as of: December 17, 2020               Content Version: 13.0  © 4820-9704 Healthwise, Incorporated. Care instructions adapted under license by [employer] (which disclaims liability or warranty for this information). If you have questions about a medical condition or this instruction, always ask your healthcare professional. [place] [address] any warranty or liability for your use of this information.

## 2021-10-26 NOTE — PROGRESS NOTES
Chief Complaint   Patient presents with    Post OP Follow Up     3 weeks s/p lap cholecystectomy iwth intraoperative cholangiogram      Rosey Mitchell is 3-week status post laparoscopic cholecystectomy with intraoperative cholangiogram.  She had somewhat of a prolonged course as her initial admission was with pancreatitis and then subsequently she had gallbladder removal.  She is doing better she says. She did have a ER visit with nausea and vomiting and that has since resolved. She feels like she has urinary tract infection. She is having some urgency and dysuria. She was advised to drink cranberry juice which has helped some. She has had no fever, chills, chest pain or shortness of breath  No further nausea or vomiting  Abdominal pain has improved and she still has some soreness with activity  She is tolerating a low-fat diet  Bowels are moving daily sometimes a little bit loose with some urgency depending on what she eats  She is walking some and doing her day-to-day activities around the home without difficulty  She is not taking anything for pain    Visit Vitals  BP (!) 162/88 Comment: rechecked manually after 15 minutes   Pulse 70   Temp 98 °F (36.7 °C) (Oral)   Resp 18   Ht 5' 8\" (1.727 m)   Wt 314 lb (142.4 kg)   SpO2 97%   BMI 47.74 kg/m²     A + O x 3  Chest  CTA  COR  RRR  ABD Soft, obese, large abdominal pannus ; lap sites are clean, dry and intact without erythema or induration, very mild generalized tenderness at the incisions, umbilical hernia that is nontender /ND, +BS  EXT No edema; ambulating independently      ICD-10-CM ICD-9-CM    1. Follow-up examination after abdominal surgery  Z09 V67.09    2. Acute cystitis without hematuria  N30.00 595.0 trimethoprim-sulfamethoxazole (BACTRIM DS, SEPTRA DS) 160-800 mg per tablet     3-week status post laparoscopic cholecystectomy with intraoperative cholangiogram doing well  Pathology was reviewed with Ms. Morales  Reassured her about the hernia as she was very concerned about this. Small umbilical hernia was seen incidentally on CT scan. I did advise her that if she started having abdominal pain with or without vomiting that she should return and we could reevaluate. Reviewed signs and symptoms of hernia incarceration. Will empirically place on Bactrim DS 1 p.o. twice daily x3 days for acute cystitis. Advised to avoid tub baths, educated on perianal care, maintain hydration and void after coitus  May return to work 10/27/2021 regular duties (she works at the [employer] and is not required to do any heavy lifting pushing or pulling)  Reviewed a low-fat diet  Brief discussion about dietary changes to aid in weight loss  She will follow up with her primary care provider with regard to her blood pressure and have advised her to resume her blood pressure medication  Discharge from surgical care with as needed follow-up  Julisa Valencia verbalized understanding and questions were answered to the best of my knowledge and ability. Diet and activity  educational materials were provided.

## 2021-12-07 ENCOUNTER — TELEPHONE (OUTPATIENT)
Dept: SURGERY | Age: 56
End: 2021-12-07

## 2021-12-07 ENCOUNTER — TELEPHONE (OUTPATIENT)
Dept: PRIMARY CARE CLINIC | Age: 56
End: 2021-12-07

## 2021-12-07 NOTE — TELEPHONE ENCOUNTER
Returned call to [facility] verified all PHI. Patient states it's been 2 months since her surgery. She wants to know what she can drink if her stomach is upset. Such as ginger ale. If she can eat chocolate pudding? Ms [facility] TERESA KRAMER had laparoscopic cholecystectomy 10/4/21. I advised patient to avoid any carbonated beverages and chocolate should be avoided. I advised patient to follow up with the PCP office if she has any questions with regards to BP medications.

## 2021-12-07 NOTE — TELEPHONE ENCOUNTER
Patient would like a call back from Fajardo directly regarding some questions on blood pressure medication as well as some beverage questions. She had stated she missed her first PCP appointment today due to her ride not showing up. It Is now pushed out to next month.

## 2021-12-07 NOTE — TELEPHONE ENCOUNTER
----- Message from Rajendra Eduardo sent at 12/7/2021  7:31 AM EST -----  Subject: Message to Provider    QUESTIONS  Information for Provider? Patient is calling because she has a scheduled   appointment at [address] [place] [address] . Her transportation switched drivers and she was   unaware. She inform that she will be there. Please advise  ---------------------------------------------------------------------------  --------------  CALL BACK INFO  What is the best way for the office to contact you? OK to leave message on   voicemail  Preferred Call Back Phone Number? [phone]  ---------------------------------------------------------------------------  --------------  SCRIPT ANSWERS  Relationship to Patient?  Self

## 2022-01-17 ENCOUNTER — TELEPHONE (OUTPATIENT)
Dept: PRIMARY CARE CLINIC | Age: 57
End: 2022-01-17

## 2022-01-17 NOTE — TELEPHONE ENCOUNTER
----- Message from Letitia  sent at 1/17/2022  9:35 AM EST -----  Subject: Message to Provider    QUESTIONS  Information for Provider? patient called to reschedule todays appt but   asked to be rescheduled with [facility]. Unable to schedule. Please call to   discuss further  ---------------------------------------------------------------------------  --------------  CALL BACK INFO  What is the best way for the office to contact you? OK to leave message on   voicemail  Preferred Call Back Phone Number? [phone]  ---------------------------------------------------------------------------  --------------  SCRIPT ANSWERS  Relationship to Patient?  Self

## 2022-03-18 PROBLEM — K85.90 ACUTE PANCREATITIS: Status: ACTIVE | Noted: 2021-09-12

## 2022-03-19 PROBLEM — I10 HTN (HYPERTENSION), MALIGNANT: Status: ACTIVE | Noted: 2018-02-08

## 2022-03-28 ENCOUNTER — OFFICE VISIT (OUTPATIENT)
Dept: PRIMARY CARE CLINIC | Age: 57
End: 2022-03-28
Payer: COMMERCIAL

## 2022-03-28 VITALS
TEMPERATURE: 97.3 F | OXYGEN SATURATION: 98 % | SYSTOLIC BLOOD PRESSURE: 161 MMHG | HEART RATE: 76 BPM | BODY MASS INDEX: 44.41 KG/M2 | HEIGHT: 68 IN | RESPIRATION RATE: 18 BRPM | WEIGHT: 293 LBS | DIASTOLIC BLOOD PRESSURE: 117 MMHG

## 2022-03-28 DIAGNOSIS — Z87.19 HISTORY OF PANCREATITIS: ICD-10-CM

## 2022-03-28 DIAGNOSIS — E11.9 TYPE 2 DIABETES MELLITUS WITHOUT COMPLICATION, WITHOUT LONG-TERM CURRENT USE OF INSULIN (HCC): ICD-10-CM

## 2022-03-28 DIAGNOSIS — I10 HTN (HYPERTENSION), MALIGNANT: Primary | ICD-10-CM

## 2022-03-28 DIAGNOSIS — Z12.31 ENCOUNTER FOR SCREENING MAMMOGRAM FOR MALIGNANT NEOPLASM OF BREAST: ICD-10-CM

## 2022-03-28 DIAGNOSIS — Z87.19 HISTORY OF CHOLECYSTITIS: ICD-10-CM

## 2022-03-28 DIAGNOSIS — J01.10 ACUTE NON-RECURRENT FRONTAL SINUSITIS: ICD-10-CM

## 2022-03-28 LAB
ALBUMIN SERPL-MCNC: 3.4 G/DL (ref 3.5–5)
ALBUMIN/GLOB SERPL: 0.7 {RATIO} (ref 1.1–2.2)
ALP SERPL-CCNC: 91 U/L (ref 45–117)
ALT SERPL-CCNC: 23 U/L (ref 12–78)
ANION GAP SERPL CALC-SCNC: 6 MMOL/L (ref 5–15)
AST SERPL-CCNC: 19 U/L (ref 15–37)
BASOPHILS # BLD: 0.1 K/UL (ref 0–0.1)
BASOPHILS NFR BLD: 1 % (ref 0–1)
BILIRUB SERPL-MCNC: 0.2 MG/DL (ref 0.2–1)
BUN SERPL-MCNC: 11 MG/DL (ref 6–20)
BUN/CREAT SERPL: 13 (ref 12–20)
CALCIUM SERPL-MCNC: 8.9 MG/DL (ref 8.5–10.1)
CHLORIDE SERPL-SCNC: 108 MMOL/L (ref 97–108)
CHOLEST SERPL-MCNC: 178 MG/DL
CO2 SERPL-SCNC: 28 MMOL/L (ref 21–32)
CREAT SERPL-MCNC: 0.83 MG/DL (ref 0.55–1.02)
DIFFERENTIAL METHOD BLD: ABNORMAL
EOSINOPHIL # BLD: 0.1 K/UL (ref 0–0.4)
EOSINOPHIL NFR BLD: 1 % (ref 0–7)
ERYTHROCYTE [DISTWIDTH] IN BLOOD BY AUTOMATED COUNT: 14.9 % (ref 11.5–14.5)
EST. AVERAGE GLUCOSE BLD GHB EST-MCNC: 120 MG/DL
GLOBULIN SER CALC-MCNC: 4.6 G/DL (ref 2–4)
GLUCOSE SERPL-MCNC: 90 MG/DL (ref 65–100)
HBA1C MFR BLD: 5.8 % (ref 4–5.6)
HCT VFR BLD AUTO: 42.9 % (ref 35–47)
HDLC SERPL-MCNC: 48 MG/DL
HDLC SERPL: 3.7 {RATIO} (ref 0–5)
HGB BLD-MCNC: 13.2 G/DL (ref 11.5–16)
IMM GRANULOCYTES # BLD AUTO: 0 K/UL (ref 0–0.04)
IMM GRANULOCYTES NFR BLD AUTO: 0 % (ref 0–0.5)
LDLC SERPL CALC-MCNC: 116.4 MG/DL (ref 0–100)
LYMPHOCYTES # BLD: 2.2 K/UL (ref 0.8–3.5)
LYMPHOCYTES NFR BLD: 26 % (ref 12–49)
MCH RBC QN AUTO: 28.2 PG (ref 26–34)
MCHC RBC AUTO-ENTMCNC: 30.8 G/DL (ref 30–36.5)
MCV RBC AUTO: 91.7 FL (ref 80–99)
MONOCYTES # BLD: 0.7 K/UL (ref 0–1)
MONOCYTES NFR BLD: 8 % (ref 5–13)
NEUTS SEG # BLD: 5.4 K/UL (ref 1.8–8)
NEUTS SEG NFR BLD: 64 % (ref 32–75)
NRBC # BLD: 0 K/UL (ref 0–0.01)
NRBC BLD-RTO: 0 PER 100 WBC
PLATELET # BLD AUTO: 326 K/UL (ref 150–400)
PMV BLD AUTO: 9.7 FL (ref 8.9–12.9)
POTASSIUM SERPL-SCNC: 3.6 MMOL/L (ref 3.5–5.1)
PROT SERPL-MCNC: 8 G/DL (ref 6.4–8.2)
RBC # BLD AUTO: 4.68 M/UL (ref 3.8–5.2)
SODIUM SERPL-SCNC: 142 MMOL/L (ref 136–145)
TRIGL SERPL-MCNC: 68 MG/DL (ref ?–150)
VLDLC SERPL CALC-MCNC: 13.6 MG/DL
WBC # BLD AUTO: 8.4 K/UL (ref 3.6–11)

## 2022-03-28 PROCEDURE — 99204 OFFICE O/P NEW MOD 45 MIN: CPT | Performed by: INTERNAL MEDICINE

## 2022-03-28 PROCEDURE — 3044F HG A1C LEVEL LT 7.0%: CPT | Performed by: INTERNAL MEDICINE

## 2022-03-28 RX ORDER — LOSARTAN POTASSIUM 50 MG/1
50 TABLET ORAL 2 TIMES DAILY
Qty: 180 TABLET | Refills: 0 | Status: SHIPPED | OUTPATIENT
Start: 2022-03-28 | End: 2022-05-02

## 2022-03-28 RX ORDER — METOPROLOL SUCCINATE 25 MG/1
25 TABLET, EXTENDED RELEASE ORAL DAILY
Qty: 90 TABLET | Refills: 0 | Status: SHIPPED | OUTPATIENT
Start: 2022-03-28 | End: 2022-05-02 | Stop reason: SDUPTHER

## 2022-03-28 RX ORDER — AZITHROMYCIN 250 MG/1
TABLET, FILM COATED ORAL
Qty: 6 TABLET | Refills: 0 | Status: SHIPPED | OUTPATIENT
Start: 2022-03-28 | End: 2022-04-02

## 2022-03-28 NOTE — PROGRESS NOTES
Jonatan Roberson is a 60 y.o.  female and presents with     Chief Complaint   Patient presents with    New Patient    Blood Pressure Check     last dose of blood pressure medication October 2021       Pt is here to [facility]. Pt works at nursing home  Pt has a friend , not , no children. Does not smoke or drink alcohol. Pt had surgery for chronic cholecysttisi in [place] and also had pancreatitis. Pts blood pressure was up at the time and was placed on BP meds   HOwever she only had it it for a month and then ran out  Pts A1c was 6.8 about 10 years ago but pt did not know that she was diabetic, no one told her. Pt has pain in calf but it improved. Pt had some foot pain , rt foot and applied some cream  Had some tingling and numbness in feet. Pt thinks she has sinus infection      Past Medical History:   Diagnosis Date    HTN (hypertension)     Vertigo      Past Surgical History:   Procedure Laterality Date    HX LAP CHOLECYSTECTOMY  10/04/2021    by Dr. Pdero [facility] at [facility]     Current Outpatient Medications   Medication Sig    OTHER     elderberry fruit (ELDERBERRY PO) Take  by mouth.  metoprolol succinate (TOPROL-XL) 25 mg XL tablet Take 1 Tablet by mouth daily.  losartan (COZAAR) 50 mg tablet Take 1 Tablet by mouth two (2) times a day.  azithromycin (ZITHROMAX) 250 mg tablet Take 2 tablets today, then take 1 tablet daily    amLODIPine (NORVASC) 10 mg tablet Take 10 mg by mouth daily.  hydrALAZINE (APRESOLINE) 10 mg tablet Take 50 mg by mouth three (3) times daily.  therapeutic multivitamin (THERAGRAN) tablet Take 1 Tablet by mouth daily. No current facility-administered medications for this visit.      Health Maintenance   Topic Date Due    Hepatitis C Screening  Never done    DTaP/Tdap/Td series (1 - Tdap) Never done    Cervical cancer screen  Never done    Colorectal Cancer Screening Combo  Never done    Shingrix Vaccine Age 50> (1 of 2) Never done    Flu Vaccine (1) 09/01/2021    Breast Cancer Screen Mammogram  09/09/2021    Depression Screen  10/25/2022    A1C test (Diabetic or Prediabetic)  03/28/2023    Lipid Screen  03/28/2027    COVID-19 Vaccine  Completed    Pneumococcal 0-64 years  Aged [employer] History   Administered Date(s) Administered    COVID-19, Pfizer Purple top, DILUTE for use, 12+ yrs, 30mcg/0.3mL dose 01/21/2021, 02/11/2021, 10/13/2021    Influenza Vaccine (Quad) PF (>6 Mo Flulaval, Fluarix, and >3 Yrs [place], Fluzone [id number]) 02/09/2018     No LMP recorded. Patient is postmenopausal.        Allergies and Intolerances: Allergies   Allergen Reactions    Claritin [Loratadine] Other (comments)     Tachycardia    Lisinopril Other (comments)     Hoarse voice     Tylenol [Acetaminophen] Vertigo    Hydrocodone Hives    Oxycodone-Acetaminophen Anxiety     hallucinations       Family History:   Family History   Problem Relation Age of Onset    No Known Problems Mother     No Known Problems Father        Social History:   She  reports that she has never smoked. She has never used smokeless tobacco.  She  reports current alcohol use.             Review of Systems:   General: negative for - chills, fatigue, fever, weight change  Psych: negative for - anxiety, depression, irritability or mood swings  ENT: negative for - headaches, hearing change, nasal congestion, oral lesions, sneezing or sore throat  Heme/ Lymph: negative for - bleeding problems, bruising, pallor or swollen lymph nodes  Endo: negative for - hot flashes, polydipsia/polyuria or temperature intolerance  Resp: negative for - cough, shortness of breath or wheezing  CV: negative for - chest pain, edema or palpitations  GI: negative for - abdominal pain, change in bowel habits, constipation, diarrhea or nausea/vomiting  : negative for - dysuria, hematuria, incontinence, pelvic pain or vulvar/vaginal symptoms  MSK: negative for - joint pain, joint swelling or muscle pain  Neuro: negative for - confusion, headaches, seizures or weakness  Derm: negative for - dry skin, hair changes, rash or skin lesion changes          Physical:   Vitals:   Vitals:    03/28/22 1033 03/28/22 1051   BP: (!) 172/103 (!) 161/117   Pulse: 76    Resp: 18    Temp: 97.3 °F (36.3 °C)    TempSrc: Temporal    SpO2: 98%    Weight: 315 lb (142.9 kg)    Height: 5' 8\" (1.727 m)            Exam:   HEENT- atraumatic,normocephalic, awake, oriented, well nourished  Neck - supple,no enlarged lymph nodes, no JVD, no thyromegaly  Chest- CTA, no rhonchi, no crackles  Heart- rrr, no murmurs / gallop/rub  Abdomen- soft,BS+,NT, no hepatosplenomegaly  Ext - no c/c/edema   Neuro- no focal deficits. Power 5/5 all extremities  Skin - warm,dry, no obvious rashes. Review of Data:   LABS:   Lab Results   Component Value Date/Time    WBC 8.4 03/28/2022 11:22 AM    HGB 13.2 03/28/2022 11:22 AM    HCT 42.9 03/28/2022 11:22 AM    PLATELET 814 63/70/2860 11:22 AM     Lab Results   Component Value Date/Time    Sodium 142 03/28/2022 11:22 AM    Potassium 3.6 03/28/2022 11:22 AM    Chloride 108 03/28/2022 11:22 AM    CO2 28 03/28/2022 11:22 AM    Glucose 90 03/28/2022 11:22 AM    BUN 11 03/28/2022 11:22 AM    Creatinine 0.83 03/28/2022 11:22 AM     Lab Results   Component Value Date/Time    Cholesterol, total 178 03/28/2022 11:22 AM    HDL Cholesterol 48 03/28/2022 11:22 AM    LDL, calculated 116.4 (H) 03/28/2022 11:22 AM    Triglyceride 68 03/28/2022 11:22 AM     No components found for: GPT        Impression / Plan:        ICD-10-CM ICD-9-CM    1. HTN (hypertension), malignant  I10 401.0 metoprolol succinate (TOPROL-XL) 25 mg XL tablet      losartan (COZAAR) 50 mg tablet   2. History of pancreatitis  Z87.19 V12.79    3. History of cholecystitis  Z87.19 V12.79    4.  Type 2 diabetes mellitus without complication, without long-term current use of insulin (HCC)  E11.9 250.00 HEMOGLOBIN A1C WITH EAG      CBC WITH AUTOMATED DIFF      METABOLIC PANEL, COMPREHENSIVE      LIPID PANEL      MICROALBUMIN, UR, RAND W/ MICROALB/CREAT RATIO      LIPID PANEL      METABOLIC PANEL, COMPREHENSIVE      CBC WITH AUTOMATED DIFF      HEMOGLOBIN A1C WITH EAG   5. Encounter for screening mammogram for malignant neoplasm of breast  Z12.31 V76.12 LEWIS 3D CARLOS W MAMMO BI SCREENING INCL CAD   6. Acute non-recurrent frontal sinusitis  J01.10 461.1 azithromycin (ZITHROMAX) 250 mg tablet     Hypertension advised low-salt diet  -  Restart medications    Diabetes-seems to be well controlled, reassured patient that even though she has been diabetic for many years and she did not know about it, and her diabetes is well controlled,  Watch  diet  Explained to patient risk benefits of the medications. Advised patient to stop meds if having any side effects. Pt verbalized understanding of the instructions. I have discussed the diagnosis with the patient and the intended plan as seen in the above orders. The patient has received an after-visit summary and questions were answered concerning future plans. I have discussed medication side effects and warnings with the patient as well. I have reviewed the plan of care with the patient, accepted their input and they are in agreement with the treatment goals. Reviewed plan of care. Patient has provided input and agrees with goals. Follow-up and Dispositions    · Return in about 1 month (around 4/28/2022).          Jarno Parekh MD

## 2022-03-28 NOTE — PROGRESS NOTES
Chief Complaint   Patient presents with    New Patient    Blood Pressure Check     last dose of blood pressure medication October 2021         Visit Vitals  BP (!) 172/103 (BP 1 Location: Left upper arm, BP Patient Position: Sitting)   Pulse 76   Temp 97.3 °F (36.3 °C) (Temporal)   Resp 18   Ht 5' 8\" (1.727 m)   Wt 315 lb (142.9 kg)   SpO2 98%   BMI 47.90 kg/m²        1. Have you been to the ER, urgent care clinic since your last visit? Hospitalized since your last visit? YES [place] gallbladder October 2021    2. Have you seen or consulted any other health care providers outside of the [address] [place] [address] since your last visit? Include any pap smears or colon screening.  No

## 2022-03-29 ENCOUNTER — TELEPHONE (OUTPATIENT)
Dept: PRIMARY CARE CLINIC | Age: 57
End: 2022-03-29

## 2022-03-29 NOTE — TELEPHONE ENCOUNTER
----- Message from Jenny Reaves MD sent at 3/28/2022  9:08 PM EDT -----  Cholesterol is slightly elevated, would low-fat diet and exercise.   Kidney function, liver function and blood count are normal.  Hemoglobin A1c is 5.8, has improved

## 2022-03-29 NOTE — TELEPHONE ENCOUNTER
Spoke to patient and informed of lab results.  Pt still going over her off days to schedule follow up

## 2022-03-29 NOTE — PROGRESS NOTES
Cholesterol is slightly elevated, would low-fat diet and exercise.   Kidney function, liver function and blood count are normal.  Hemoglobin A1c is 5.8, has improved

## 2022-04-22 ENCOUNTER — NURSE TRIAGE (OUTPATIENT)
Dept: OTHER | Facility: CLINIC | Age: 57
End: 2022-04-22

## 2022-04-22 NOTE — TELEPHONE ENCOUNTER
Received call from Zain at [facility] with Red Flag Complaint. Subjective: Caller states \"side effects of losartan\"     Current Symptoms:   Believes she may be having side effects from her new medication-Losartan   Symptoms started shortly after starting it  Trembling of her thumbs, not currently trembling, notices it more when she holds things  Blurry vision   Nausea-this started after the blood work, vomited 3 times on transport and then 3-4 times more after getting home  Lightheaded, fatigue  Blood pressures have been running normal    Onset: 3/28; improving, feels better than when it first started    Associated Symptoms: NA    Pain Severity: slight HA on left side of head    Temperature: denies     What has been tried: elderberry and vitamin C packets    LMP: NA Pregnant: NA    Recommended disposition: Go to [facility], patient states she is unable to get in until Monday. Reiterated my disposition to the patient and expressed concern for the patient's well-being. Care advice provided, patient verbalizes understanding; denies any other questions or concerns; instructed to call back for any new or worsening symptoms. Writer provided warm transfer to [facility] at [employer] for refusal of urgent disposition. Attention Provider: Thank you for allowing me to participate in the care of your patient. The patient was connected to triage in response to information provided to the [facility]. Please do not respond through this encounter as the response is not directed to a shared pool.     Reason for Disposition   Lightheadedness (dizziness) present now, after 2 hours of rest and fluids    Protocols used: DIZZINESS-ADULT-OH

## 2022-05-02 ENCOUNTER — OFFICE VISIT (OUTPATIENT)
Dept: PRIMARY CARE CLINIC | Age: 57
End: 2022-05-02
Payer: COMMERCIAL

## 2022-05-02 VITALS
HEIGHT: 68 IN | DIASTOLIC BLOOD PRESSURE: 115 MMHG | HEART RATE: 55 BPM | WEIGHT: 293 LBS | SYSTOLIC BLOOD PRESSURE: 181 MMHG | BODY MASS INDEX: 44.41 KG/M2 | RESPIRATION RATE: 18 BRPM | TEMPERATURE: 97.1 F | OXYGEN SATURATION: 98 %

## 2022-05-02 DIAGNOSIS — I10 HTN (HYPERTENSION), MALIGNANT: ICD-10-CM

## 2022-05-02 DIAGNOSIS — E66.01 CLASS 3 SEVERE OBESITY DUE TO EXCESS CALORIES WITHOUT SERIOUS COMORBIDITY WITH BODY MASS INDEX (BMI) OF 45.0 TO 49.9 IN ADULT (HCC): ICD-10-CM

## 2022-05-02 DIAGNOSIS — E11.9 TYPE 2 DIABETES MELLITUS WITHOUT COMPLICATION, WITHOUT LONG-TERM CURRENT USE OF INSULIN (HCC): Primary | ICD-10-CM

## 2022-05-02 PROCEDURE — 99214 OFFICE O/P EST MOD 30 MIN: CPT | Performed by: INTERNAL MEDICINE

## 2022-05-02 PROCEDURE — 3044F HG A1C LEVEL LT 7.0%: CPT | Performed by: INTERNAL MEDICINE

## 2022-05-02 RX ORDER — CHLORTHALIDONE 25 MG/1
25 TABLET ORAL DAILY
Qty: 90 TABLET | Refills: 1 | Status: SHIPPED | OUTPATIENT
Start: 2022-05-02

## 2022-05-02 RX ORDER — METOPROLOL SUCCINATE 25 MG/1
25 TABLET, EXTENDED RELEASE ORAL DAILY
Qty: 90 TABLET | Refills: 0 | Status: SHIPPED | OUTPATIENT
Start: 2022-05-02 | End: 2022-08-23

## 2022-05-02 RX ORDER — METFORMIN HYDROCHLORIDE 500 MG/1
500 TABLET ORAL 2 TIMES DAILY WITH MEALS
Qty: 180 TABLET | Refills: 1 | Status: SHIPPED | OUTPATIENT
Start: 2022-05-02

## 2022-05-02 RX ORDER — VALSARTAN 160 MG/1
160 TABLET ORAL 2 TIMES DAILY
Qty: 180 TABLET | Refills: 1 | Status: SHIPPED | OUTPATIENT
Start: 2022-05-02

## 2022-05-02 NOTE — PROGRESS NOTES
Chief Complaint   Patient presents with    Hypertension     follow up         Visit Vitals  BP (!) 181/115 (BP 1 Location: Left upper arm, BP Patient Position: Sitting)   Pulse (!) 55   Temp 97.1 °F (36.2 °C) (Temporal)   Resp 18   Ht 5' 8\" (1.727 m)   Wt 311 lb (141.1 kg)   LMP 05/02/2010   SpO2 98%   BMI 47.29 kg/m²        1. Have you been to the ER, urgent care clinic since your last visit? Hospitalized since your last visit? No    2. Have you seen or consulted any other health care providers outside of the [address] [place] [address] since your last visit? Include any pap smears or colon screening.  No

## 2022-05-02 NOTE — PROGRESS NOTES
Kelvin Short is a 59 y.o.  female and presents with     Chief Complaint   Patient presents with    Hypertension     follow up      Pt got nasueous after taking losartan. Amlodipine had to be stopped due to leg swelling  Labs reveal improvement in A1. Past Medical History:   Diagnosis Date    HTN (hypertension)     Vertigo      Past Surgical History:   Procedure Laterality Date    HX LAP CHOLECYSTECTOMY  10/04/2021    by Dr. Trisha Romeo at [facility]     Current Outpatient Medications   Medication Sig    valsartan (DIOVAN) 160 mg tablet Take 1 Tablet by mouth two (2) times a day.  chlorthalidone (HYGROTON) 25 mg tablet Take 1 Tablet by mouth daily.  metoprolol succinate (TOPROL-XL) 25 mg XL tablet Take 1 Tablet by mouth daily.  metFORMIN (GLUCOPHAGE) 500 mg tablet Take 1 Tablet by mouth two (2) times daily (with meals).  OTHER     elderberry fruit (ELDERBERRY PO) Take  by mouth.  therapeutic multivitamin (THERAGRAN) tablet Take 1 Tablet by mouth daily. (Patient not taking: Reported on 5/2/2022)     No current facility-administered medications for this visit.      Health Maintenance   Topic Date Due    Hepatitis C Screening  Never done    Pneumococcal 0-64 years (1 - PCV) Never done    DTaP/Tdap/Td series (1 - Tdap) Never done    Cervical cancer screen  Never done    Colorectal Cancer Screening Combo  Never done    Shingrix Vaccine Age 50> (1 of 2) Never done    Breast Cancer Screen Mammogram  09/09/2021    Flu Vaccine (Season Ended) 09/01/2022    Depression Screen  10/25/2022    A1C test (Diabetic or Prediabetic)  03/28/2023    Lipid Screen  03/28/2027    COVID-19 Vaccine  Completed     Immunization History   Administered Date(s) Administered    COVID-19, Pfizer Purple top, DILUTE for use, 12+ yrs, 30mcg/0.3mL dose 01/21/2021, 02/11/2021, 10/13/2021    Influenza Vaccine [employer]app) PF (>6 Mo Flulaval, Fluarix, and >3 Yrs [place], Fluzone [id number]) 02/09/2018     Patient's last menstrual period was 05/02/2010. Allergies and Intolerances: Allergies   Allergen Reactions    Claritin [Loratadine] Other (comments)     Tachycardia    Lisinopril Other (comments)     Hoarse voice     Losartan Nausea Only    Tylenol [Acetaminophen] Vertigo    Hydrocodone Hives    Oxycodone-Acetaminophen Anxiety     hallucinations       Family History:   Family History   Problem Relation Age of Onset    No Known Problems Mother     No Known Problems Father        Social History:   She  reports that she has never smoked. She has never used smokeless tobacco.  She  reports current alcohol use.             Review of Systems:   General: negative for - chills, fatigue, fever, weight change  Psych: negative for - anxiety, depression, irritability or mood swings  ENT: negative for - headaches, hearing change, nasal congestion, oral lesions, sneezing or sore throat  Heme/ Lymph: negative for - bleeding problems, bruising, pallor or swollen lymph nodes  Endo: negative for - hot flashes, polydipsia/polyuria or temperature intolerance  Resp: negative for - cough, shortness of breath or wheezing  CV: negative for - chest pain, edema or palpitations  GI: negative for - abdominal pain, change in bowel habits, constipation, diarrhea or nausea/vomiting  : negative for - dysuria, hematuria, incontinence, pelvic pain or vulvar/vaginal symptoms  MSK: negative for - joint pain, joint swelling or muscle pain  Neuro: negative for - confusion, headaches, seizures or weakness  Derm: negative for - dry skin, hair changes, rash or skin lesion changes          Physical:   Vitals:   Vitals:    05/02/22 1131   BP: (!) 181/115   Pulse: (!) 55   Resp: 18   Temp: 97.1 °F (36.2 °C)   TempSrc: Temporal   SpO2: 98%   Weight: 311 lb (141.1 kg)   Height: 5' 8\" (1.727 m)           Exam:   HEENT- atraumatic,normocephalic, awake, oriented, well nourished  Neck - supple,no enlarged lymph nodes, no JVD, no thyromegaly  Chest- CTA, no rhonchi, no crackles  Heart- rrr, no murmurs / gallop/rub  Abdomen- soft,BS+,NT, no hepatosplenomegaly  Ext - no c/c/edema   Neuro- no focal deficits. Power 5/5 all extremities  Skin - warm,dry, no obvious rashes. Review of Data:   LABS:   Lab Results   Component Value Date/Time    WBC 8.4 03/28/2022 11:22 AM    HGB 13.2 03/28/2022 11:22 AM    HCT 42.9 03/28/2022 11:22 AM    PLATELET 443 24/87/8285 11:22 AM     Lab Results   Component Value Date/Time    Sodium 142 03/28/2022 11:22 AM    Potassium 3.6 03/28/2022 11:22 AM    Chloride 108 03/28/2022 11:22 AM    CO2 28 03/28/2022 11:22 AM    Glucose 90 03/28/2022 11:22 AM    BUN 11 03/28/2022 11:22 AM    Creatinine 0.83 03/28/2022 11:22 AM     Lab Results   Component Value Date/Time    Cholesterol, total 178 03/28/2022 11:22 AM    HDL Cholesterol 48 03/28/2022 11:22 AM    LDL, calculated 116.4 (H) 03/28/2022 11:22 AM    Triglyceride 68 03/28/2022 11:22 AM     No components found for: GPT        Impression / Plan:        ICD-10-CM ICD-9-CM    1. Type 2 diabetes mellitus without complication, without long-term current use of insulin ([facility])  E11.9 250.00 metFORMIN (GLUCOPHAGE) 500 mg tablet   2. HTN (hypertension), malignant  I10 401.0 valsartan (DIOVAN) 160 mg tablet      chlorthalidone (HYGROTON) 25 mg tablet      metoprolol succinate (TOPROL-XL) 25 mg XL tablet   3. Class 3 severe obesity due to excess calories without serious comorbidity with body mass index (BMI) of 45.0 to 49.9 in adult ([facility])  E66.01 278.01 metFORMIN (GLUCOPHAGE) 500 mg tablet    Z68.42 V85.42        HTN - blood pressure elevated , low salt diet, monitor blood pressure. DM - stable    Obesity - advised walking daily. Explained to patient risk benefits of the medications. Advised patient to stop meds if having any side effects. Pt verbalized understanding of the instructions. I have discussed the diagnosis with the patient and the intended plan as seen in the above orders.   The patient has received an after-visit summary and questions were answered concerning future plans. I have discussed medication side effects and warnings with the patient as well. I have reviewed the plan of care with the patient, accepted their input and they are in agreement with the treatment goals. Reviewed plan of care. Patient has provided input and agrees with goals. Follow-up and Dispositions    · Return in about 3 months (around 8/2/2022).          Nan Artis MD

## 2022-06-20 ENCOUNTER — OFFICE VISIT (OUTPATIENT)
Dept: PRIMARY CARE CLINIC | Age: 57
End: 2022-06-20
Payer: COMMERCIAL

## 2022-06-20 VITALS
OXYGEN SATURATION: 98 % | HEART RATE: 60 BPM | RESPIRATION RATE: 18 BRPM | TEMPERATURE: 97.5 F | SYSTOLIC BLOOD PRESSURE: 167 MMHG | DIASTOLIC BLOOD PRESSURE: 111 MMHG | HEIGHT: 68 IN | WEIGHT: 293 LBS | BODY MASS INDEX: 44.41 KG/M2

## 2022-06-20 DIAGNOSIS — E27.8 ADRENAL MASS, LEFT (HCC): ICD-10-CM

## 2022-06-20 DIAGNOSIS — K57.30 DIVERTICULOSIS OF COLON: ICD-10-CM

## 2022-06-20 DIAGNOSIS — K42.9 UMBILICAL HERNIA WITHOUT OBSTRUCTION AND WITHOUT GANGRENE: ICD-10-CM

## 2022-06-20 DIAGNOSIS — I10 PRIMARY HYPERTENSION: ICD-10-CM

## 2022-06-20 DIAGNOSIS — I51.7 LVH (LEFT VENTRICULAR HYPERTROPHY): Primary | ICD-10-CM

## 2022-06-20 DIAGNOSIS — E11.9 TYPE 2 DIABETES MELLITUS WITHOUT COMPLICATION, WITHOUT LONG-TERM CURRENT USE OF INSULIN (HCC): ICD-10-CM

## 2022-06-20 DIAGNOSIS — D25.9 UTERINE LEIOMYOMA, UNSPECIFIED LOCATION: ICD-10-CM

## 2022-06-20 PROCEDURE — 3044F HG A1C LEVEL LT 7.0%: CPT | Performed by: INTERNAL MEDICINE

## 2022-06-20 PROCEDURE — 99214 OFFICE O/P EST MOD 30 MIN: CPT | Performed by: INTERNAL MEDICINE

## 2022-06-20 RX ORDER — POLYETHYLENE GLYCOL 3350 17 G/17G
17 POWDER, FOR SOLUTION ORAL DAILY
Qty: 850 G | Refills: 3 | Status: SHIPPED | OUTPATIENT
Start: 2022-06-20

## 2022-06-20 RX ORDER — AMLODIPINE BESYLATE 10 MG/1
10 TABLET ORAL
Qty: 90 TABLET | Refills: 1 | Status: SHIPPED | OUTPATIENT
Start: 2022-06-20

## 2022-06-20 NOTE — PROGRESS NOTES
Chief Complaint   Patient presents with    Hypertension     Follow up         Visit Vitals  BP (!) 194/100 (BP 1 Location: Left upper arm, BP Patient Position: Sitting)   Pulse (!) 57   Temp 97.5 °F (36.4 °C) (Temporal)   Resp 18   Ht 5' 8\" (1.727 m)   Wt 307 lb (139.3 kg)   SpO2 98%   BMI 46.68 kg/m²        Health Maintenance Due   Topic    Hepatitis C Screening     Pneumococcal 0-64 years (1 - PCV)    Foot Exam Q1     Eye Exam Retinal or Dilated     DTaP/Tdap/Td series (1 - Tdap)    Cervical cancer screen     Colorectal Cancer Screening Combo     Shingrix Vaccine Age 50> (1 of 2)    MICROALBUMIN Q1     Breast Cancer Screen Mammogram         1. Have you been to the ER, urgent care clinic since your last visit? Hospitalized since your last visit? No    2. Have you seen or consulted any other health care providers outside of the [address] [place] [address] since your last visit? Include any pap smears or colon screening.  No

## 2022-06-20 NOTE — PROGRESS NOTES
Antonio Goff is a 59 y.o.  female and presents with     Chief Complaint   Patient presents with    Hypertension     Follow up      Pt has some epigastric discomfort. She has it off and on since she had gallbladder surgery. Has cut down on spicy , greasy food. Bowel movemetns are regular  NO headaches. EKG shows LVH  Pt has left lower quadrant pain  CT scan showed diverticulosis and fecal stasis. Also seen was adrenal mass. Past Medical History:   Diagnosis Date    HTN (hypertension)     Vertigo      Past Surgical History:   Procedure Laterality Date    HX LAP CHOLECYSTECTOMY  10/04/2021    by Dr. Tahir Gonzalez at [facility]     Current Outpatient Medications   Medication Sig    amLODIPine (NORVASC) 10 mg tablet Take 1 Tablet by mouth nightly.  polyethylene glycol (MIRALAX) 17 gram/dose powder Take 17 g by mouth daily.  valsartan (DIOVAN) 160 mg tablet Take 1 Tablet by mouth two (2) times a day.  chlorthalidone (HYGROTON) 25 mg tablet Take 1 Tablet by mouth daily.  metoprolol succinate (TOPROL-XL) 25 mg XL tablet Take 1 Tablet by mouth daily.  metFORMIN (GLUCOPHAGE) 500 mg tablet Take 1 Tablet by mouth two (2) times daily (with meals).  OTHER     elderberry fruit (ELDERBERRY PO) Take  by mouth.  therapeutic multivitamin (THERAGRAN) tablet Take 1 Tablet by mouth daily. (Patient not taking: Reported on 5/2/2022)     No current facility-administered medications for this visit.      Health Maintenance   Topic Date Due    Hepatitis C Screening  Never done    Pneumococcal 0-64 years (1 - PCV) Never done    Foot Exam Q1  Never done    Eye Exam Retinal or Dilated  Never done    DTaP/Tdap/Td series (1 - Tdap) Never done    Cervical cancer screen  Never done    Colorectal Cancer Screening Combo  Never done    Shingrix Vaccine Age 50> (1 of 2) Never done    MICROALBUMIN Q1  04/29/2016    Breast Cancer Screen Mammogram  09/09/2021    Flu Vaccine (Season Ended) 09/01/2022    Depression Screen 10/25/2022    A1C test (Diabetic or Prediabetic)  03/28/2023    Lipid Screen  03/28/2023    COVID-19 Vaccine  Completed     Immunization History   Administered Date(s) Administered    COVID-19, Pfizer Purple top, DILUTE for use, 12+ yrs, 30mcg/0.3mL dose 01/21/2021, 02/11/2021, 10/13/2021    Influenza Vaccine [employer]) PF (>6 Mo Flulaval, Fluarix, and >3 Yrs Afluria, Fluzone 59067) 02/09/2018     No LMP recorded. (Menstrual status: Menopause). Allergies and Intolerances: Allergies   Allergen Reactions    Claritin [Loratadine] Other (comments)     Tachycardia    Lisinopril Other (comments)     Hoarse voice     Losartan Nausea Only    Tylenol [Acetaminophen] Vertigo    Hydrocodone Hives    Oxycodone-Acetaminophen Anxiety     hallucinations       Family History:   Family History   Problem Relation Age of Onset    No Known Problems Mother     No Known Problems Father        Social History:   She  reports that she has never smoked. She has never used smokeless tobacco.  She  reports current alcohol use.             Review of Systems:   General: negative for - chills, fatigue, fever, weight change  Psych: negative for - anxiety, depression, irritability or mood swings  ENT: negative for - headaches, hearing change, nasal congestion, oral lesions, sneezing or sore throat  Heme/ Lymph: negative for - bleeding problems, bruising, pallor or swollen lymph nodes  Endo: negative for - hot flashes, polydipsia/polyuria or temperature intolerance  Resp: negative for - cough, shortness of breath or wheezing  CV: negative for - chest pain, edema or palpitations  GI: negative for - abdominal pain, change in bowel habits, constipation, diarrhea or nausea/vomiting  : negative for - dysuria, hematuria, incontinence, pelvic pain or vulvar/vaginal symptoms  MSK: negative for - joint pain, joint swelling or muscle pain  Neuro: negative for - confusion, headaches, seizures or weakness  Derm: negative for - dry skin, hair changes, rash or skin lesion changes          Physical:   Vitals:   Vitals:    06/20/22 0938 06/20/22 0954 06/20/22 1011   BP: (!) 194/100 (!) 155/91 (!) 167/111   Pulse: (!) 57 60    Resp: 18     Temp: 97.5 °F (36.4 °C)     TempSrc: Temporal     SpO2: 98%     Weight: 307 lb (139.3 kg)     Height: 5' 8\" (1.727 m)             Exam:   HEENT- atraumatic,normocephalic, awake, oriented, well nourished  Neck - supple,no enlarged lymph nodes, no JVD, no thyromegaly  Chest- CTA, no rhonchi, no crackles  Heart- rrr, no murmurs / gallop/rub  Abdomen- soft,BS+,NT, no hepatosplenomegaly, mild left lower quadrant tenderness  Ext - no c/c/edema   Neuro- no focal deficits. Power 5/5 all extremities  Skin - warm,dry, no obvious rashes. Review of Data:   LABS:   Lab Results   Component Value Date/Time    WBC 8.4 03/28/2022 11:22 AM    HGB 13.2 03/28/2022 11:22 AM    HCT 42.9 03/28/2022 11:22 AM    PLATELET 522 48/12/0605 11:22 AM     Lab Results   Component Value Date/Time    Sodium 142 03/28/2022 11:22 AM    Potassium 3.6 03/28/2022 11:22 AM    Chloride 108 03/28/2022 11:22 AM    CO2 28 03/28/2022 11:22 AM    Glucose 90 03/28/2022 11:22 AM    BUN 11 03/28/2022 11:22 AM    Creatinine 0.83 03/28/2022 11:22 AM     Lab Results   Component Value Date/Time    Cholesterol, total 178 03/28/2022 11:22 AM    HDL Cholesterol 48 03/28/2022 11:22 AM    LDL, calculated 116.4 (H) 03/28/2022 11:22 AM    Triglyceride 68 03/28/2022 11:22 AM     No components found for: GPT        Impression / Plan:        ICD-10-CM ICD-9-CM    1. LVH (left ventricular hypertrophy)  I51.7 429.3 amLODIPine (NORVASC) 10 mg tablet      ECHO ADULT COMPLETE   2. Primary hypertension  I10 401.9 amLODIPine (NORVASC) 10 mg tablet      ECHO ADULT COMPLETE   3. Type 2 diabetes mellitus without complication, without long-term current use of insulin (HCC)  E11.9 250.00    4. Adrenal mass, left (HCC)  E27.8 255.8 CT ABD W CONT   5.  Diverticulosis of colon  K57.30 562.10 polyethylene glycol (MIRALAX) 17 gram/dose powder   6. Umbilical hernia without obstruction and without gangrene  K42.9 553.1    7. Uterine leiomyoma, unspecified location  D25.9 218.9          Explained to patient risk benefits of the medications. Advised patient to stop meds if having any side effects. Pt verbalized understanding of the instructions. I have discussed the diagnosis with the patient and the intended plan as seen in the above orders. The patient has received an after-visit summary and questions were answered concerning future plans. I have discussed medication side effects and warnings with the patient as well. I have reviewed the plan of care with the patient, accepted their input and they are in agreement with the treatment goals. Reviewed plan of care. Patient has provided input and agrees with goals. Follow-up and Dispositions    · Return in about 3 months (around 9/20/2022).          Mirta Brito MD

## 2022-07-11 ENCOUNTER — TELEPHONE (OUTPATIENT)
Dept: PRIMARY CARE CLINIC | Age: 57
End: 2022-07-11

## 2022-07-11 ENCOUNTER — HOSPITAL ENCOUNTER (OUTPATIENT)
Dept: NON INVASIVE DIAGNOSTICS | Age: 57
Discharge: HOME | End: 2022-07-11
Attending: INTERNAL MEDICINE
Payer: COMMERCIAL

## 2022-07-11 ENCOUNTER — HOSPITAL ENCOUNTER (OUTPATIENT)
Dept: CT IMAGING | Age: 57
End: 2022-07-11
Attending: INTERNAL MEDICINE
Payer: COMMERCIAL

## 2022-07-11 VITALS
SYSTOLIC BLOOD PRESSURE: 167 MMHG | WEIGHT: 293 LBS | HEIGHT: 68 IN | BODY MASS INDEX: 44.41 KG/M2 | DIASTOLIC BLOOD PRESSURE: 111 MMHG

## 2022-07-11 DIAGNOSIS — I51.7 LVH (LEFT VENTRICULAR HYPERTROPHY): ICD-10-CM

## 2022-07-11 DIAGNOSIS — I10 PRIMARY HYPERTENSION: ICD-10-CM

## 2022-07-11 LAB
ECHO AO ROOT DIAM: 3.1 CM
ECHO AO ROOT INDEX: 1.27 CM/M2
ECHO AV AREA PEAK VELOCITY: 1.7 CM2
ECHO AV AREA/BSA PEAK VELOCITY: 0.7 CM2/M2
ECHO AV PEAK GRADIENT: 8 MMHG
ECHO AV PEAK VELOCITY: 1.4 M/S
ECHO AV VELOCITY RATIO: 0.71
ECHO LA DIAMETER INDEX: 1.27 CM/M2
ECHO LA DIAMETER: 3.1 CM
ECHO LA TO AORTIC ROOT RATIO: 1
ECHO LA VOL 2C: 63 ML (ref 22–52)
ECHO LA VOL 4C: 55 ML (ref 22–52)
ECHO LA VOL BP: 63 ML (ref 22–52)
ECHO LA VOL/BSA BIPLANE: 26 ML/M2 (ref 16–34)
ECHO LA VOLUME AREA LENGTH: 69 ML
ECHO LA VOLUME INDEX A2C: 26 ML/M2 (ref 16–34)
ECHO LA VOLUME INDEX A4C: 22 ML/M2 (ref 16–34)
ECHO LA VOLUME INDEX AREA LENGTH: 28 ML/M2 (ref 16–34)
ECHO LV E' LATERAL VELOCITY: 8 CM/S
ECHO LV E' SEPTAL VELOCITY: 7 CM/S
ECHO LV FRACTIONAL SHORTENING: 42 % (ref 28–44)
ECHO LV INTERNAL DIMENSION DIASTOLE INDEX: 2.12 CM/M2
ECHO LV INTERNAL DIMENSION DIASTOLIC: 5.2 CM (ref 3.9–5.3)
ECHO LV INTERNAL DIMENSION SYSTOLIC INDEX: 1.22 CM/M2
ECHO LV INTERNAL DIMENSION SYSTOLIC: 3 CM
ECHO LV IVSD: 1 CM (ref 0.6–0.9)
ECHO LV MASS 2D: 194.2 G (ref 67–162)
ECHO LV MASS INDEX 2D: 79.2 G/M2 (ref 43–95)
ECHO LV POSTERIOR WALL DIASTOLIC: 1 CM (ref 0.6–0.9)
ECHO LV RELATIVE WALL THICKNESS RATIO: 0.38
ECHO LVOT AREA: 2.5 CM2
ECHO LVOT DIAM: 1.8 CM
ECHO LVOT PEAK GRADIENT: 4 MMHG
ECHO LVOT PEAK VELOCITY: 1 M/S
ECHO MV A VELOCITY: 0.58 M/S
ECHO MV E DECELERATION TIME (DT): 219.4 MS
ECHO MV E VELOCITY: 0.46 M/S
ECHO MV E/A RATIO: 0.79
ECHO MV E/E' LATERAL: 5.75
ECHO MV E/E' RATIO (AVERAGED): 6.16
ECHO MV E/E' SEPTAL: 6.57
ECHO PV MAX VELOCITY: 0.8 M/S
ECHO PV PEAK GRADIENT: 3 MMHG
ECHO RV FREE WALL PEAK S': 14 CM/S
ECHO RV INTERNAL DIMENSION: 3.2 CM
ECHO RV TAPSE: 2.1 CM (ref 1.7–?)
ECHO TV REGURGITANT MAX VELOCITY: 1.96 M/S
ECHO TV REGURGITANT PEAK GRADIENT: 15 MMHG

## 2022-07-11 PROCEDURE — 93306 TTE W/DOPPLER COMPLETE: CPT

## 2022-08-23 DIAGNOSIS — I10 HTN (HYPERTENSION), MALIGNANT: ICD-10-CM

## 2022-08-23 RX ORDER — METOPROLOL SUCCINATE 25 MG/1
TABLET, EXTENDED RELEASE ORAL
Qty: 90 TABLET | Refills: 0 | Status: SHIPPED | OUTPATIENT
Start: 2022-08-23

## 2022-09-20 ENCOUNTER — OFFICE VISIT (OUTPATIENT)
Dept: PRIMARY CARE CLINIC | Age: 57
End: 2022-09-20
Payer: COMMERCIAL

## 2022-09-20 VITALS
TEMPERATURE: 97.5 F | SYSTOLIC BLOOD PRESSURE: 165 MMHG | WEIGHT: 293 LBS | BODY MASS INDEX: 44.41 KG/M2 | OXYGEN SATURATION: 97 % | HEIGHT: 68 IN | HEART RATE: 60 BPM | RESPIRATION RATE: 18 BRPM | DIASTOLIC BLOOD PRESSURE: 87 MMHG

## 2022-09-20 DIAGNOSIS — D35.02 ADRENAL ADENOMA, LEFT: ICD-10-CM

## 2022-09-20 DIAGNOSIS — E11.9 TYPE 2 DIABETES MELLITUS WITHOUT COMPLICATION, WITHOUT LONG-TERM CURRENT USE OF INSULIN (HCC): ICD-10-CM

## 2022-09-20 DIAGNOSIS — I10 HTN (HYPERTENSION), MALIGNANT: Primary | ICD-10-CM

## 2022-09-20 DIAGNOSIS — M54.2 NECK PAIN: ICD-10-CM

## 2022-09-20 PROCEDURE — 99214 OFFICE O/P EST MOD 30 MIN: CPT | Performed by: INTERNAL MEDICINE

## 2022-09-20 PROCEDURE — 3044F HG A1C LEVEL LT 7.0%: CPT | Performed by: INTERNAL MEDICINE

## 2022-09-20 NOTE — PROGRESS NOTES
Justine Mckoy is a 59 y.o.  female and presents with     Chief Complaint   Patient presents with    Follow-up    Diabetes Care Management     Neck Pain     On left side of neck - when yawning      Pt has pain her neck on the lef side when she  yawns  Had ECHO done that was normal.  Checks blood pressure at home , sometimes it is in the red. She had a CT abdomen in the past for nausea and abdominal pain that revealed incidental adrenal adenoma    Blood pressure is elevated on blood pressure medicines. Past Medical History:   Diagnosis Date    HTN (hypertension)     Vertigo      Past Surgical History:   Procedure Laterality Date    HX LAP CHOLECYSTECTOMY  10/04/2021    by Dr. Alisha Wolff at [facility]     Current Outpatient Medications   Medication Sig    metoprolol succinate (TOPROL-XL) 25 mg XL tablet TAKE 1 TABLET BY MOUTH EVERY DAY    amLODIPine (NORVASC) 10 mg tablet Take 1 Tablet by mouth nightly. polyethylene glycol (MIRALAX) 17 gram/dose powder Take 17 g by mouth daily. valsartan (DIOVAN) 160 mg tablet Take 1 Tablet by mouth two (2) times a day. chlorthalidone (HYGROTON) 25 mg tablet Take 1 Tablet by mouth daily. metFORMIN (GLUCOPHAGE) 500 mg tablet Take 1 Tablet by mouth two (2) times daily (with meals). OTHER     elderberry fruit (ELDERBERRY PO) Take  by mouth. No current facility-administered medications for this visit.      Health Maintenance   Topic Date Due    Hepatitis C Screening  Never done    Pneumococcal 0-64 years (1 - PCV) Never done    Foot Exam Q1  Never done    Eye Exam Retinal or Dilated  Never done    DTaP/Tdap/Td series (1 - Tdap) Never done    Cervical cancer screen  Never done    Colorectal Cancer Screening Combo  Never done    Shingrix Vaccine Age 50> (1 of 2) Never done    MICROALBUMIN Q1  04/29/2016    Breast Cancer Screen Mammogram  09/09/2021    COVID-19 Vaccine (4 - Booster for Pfizer series) 02/13/2022    Flu Vaccine (1) 09/01/2022    Depression Screen  10/25/2022    A1C test (Diabetic or Prediabetic)  03/28/2023    Lipid Screen  03/28/2023     Immunization History   Administered Date(s) Administered    COVID-19, PFIZER PURPLE top, DILUTE for use, (age 15 y+), IM, 30mcg/0.3mL 01/21/2021, 02/11/2021, 10/13/2021    Influenza, FLUARIX, FLULAVAL, FLUZONE (age 10 mo+) AND AFLURIA, (age 1 y+), PF, 0.5mL 02/09/2018     No LMP recorded (lmp unknown). (Menstrual status: Menopause). Allergies and Intolerances: Allergies   Allergen Reactions    Claritin [Loratadine] Other (comments)     Tachycardia    Lisinopril Other (comments)     Hoarse voice     Losartan Nausea Only    Tylenol [Acetaminophen] Vertigo    Hydrocodone Hives    Oxycodone-Acetaminophen Anxiety     hallucinations       Family History:   Family History   Problem Relation Age of Onset    No Known Problems Mother     No Known Problems Father        Social History:   She  reports that she has never smoked. She has never used smokeless tobacco.  She  reports current alcohol use.             Review of Systems:   General: negative for - chills, fatigue, fever, weight change  Psych: negative for - anxiety, depression, irritability or mood swings  ENT: negative for - headaches, hearing change, nasal congestion, oral lesions, sneezing or sore throat  Heme/ Lymph: negative for - bleeding problems, bruising, pallor or swollen lymph nodes  Endo: negative for - hot flashes, polydipsia/polyuria or temperature intolerance  Resp: negative for - cough, shortness of breath or wheezing  CV: negative for - chest pain, edema or palpitations  GI: negative for - abdominal pain, change in bowel habits, constipation, diarrhea or nausea/vomiting  : negative for - dysuria, hematuria, incontinence, pelvic pain or vulvar/vaginal symptoms  MSK: negative for - joint pain, joint swelling or muscle pain  Neuro: negative for - confusion, headaches, seizures or weakness  Derm: negative for - dry skin, hair changes, rash or skin lesion changes          Physical:   Vitals:   Vitals:    09/20/22 1041 09/20/22 1049   BP: (!) 159/95 (!) 165/87   Pulse: 60    Resp: 18    Temp: 97.5 °F (36.4 °C)    TempSrc: Temporal    SpO2: 97%    Weight: 319 lb 12.8 oz (145.1 kg)    Height: 5' 8\" (1.727 m)            Exam:   HEENT- atraumatic,normocephalic, awake, oriented, well nourished  Neck - supple,no enlarged lymph nodes, no JVD, no thyromegaly  Chest- CTA, no rhonchi, no crackles  Heart- rrr, no murmurs / gallop/rub  Abdomen- soft,BS+,NT, no hepatosplenomegaly  Ext - no c/c/edema   Neuro- no focal deficits. Power 5/5 all extremities  Skin - warm,dry, no obvious rashes. Review of Data:   LABS:   Lab Results   Component Value Date/Time    WBC 8.4 03/28/2022 11:22 AM    HGB 13.2 03/28/2022 11:22 AM    HCT 42.9 03/28/2022 11:22 AM    PLATELET 706 02/27/3183 11:22 AM     Lab Results   Component Value Date/Time    Sodium 142 03/28/2022 11:22 AM    Potassium 3.6 03/28/2022 11:22 AM    Chloride 108 03/28/2022 11:22 AM    CO2 28 03/28/2022 11:22 AM    Glucose 90 03/28/2022 11:22 AM    BUN 11 03/28/2022 11:22 AM    Creatinine 0.83 03/28/2022 11:22 AM     Lab Results   Component Value Date/Time    Cholesterol, total 178 03/28/2022 11:22 AM    HDL Cholesterol 48 03/28/2022 11:22 AM    LDL, calculated 116.4 (H) 03/28/2022 11:22 AM    Triglyceride 68 03/28/2022 11:22 AM     No components found for: GPT        Impression / Plan:        ICD-10-CM ICD-9-CM    1. HTN (hypertension), malignant  I10 401.0       2. Type 2 diabetes mellitus without complication, without long-term current use of insulin (HCC)  E22.0 404.17 METABOLIC PANEL, COMPREHENSIVE      HEMOGLOBIN A1C WITH EAG      3. Adrenal adenoma, left  D35.02 227.0 RENIN ACTIVITY      CATECHOLAMINE+VMA, 24-HR URINE      CORTISOL, AM      4.  Neck pain  M54.2 723.1 XR SPINE CERV FLEX/EXT MAX 3 V        Diabetes-stable      Hypertension-blood pressure elevated, echocardiogram is normal, need to rule out secondary hypertension such as pheochromocytoma, or Cushing syndrome    CT scan does show adrenal nodule-could be incidentaloma, but need to check functionality      Explained to patient risk benefits of the medications. Advised patient to stop meds if having any side effects. Pt verbalized understanding of the instructions. I have discussed the diagnosis with the patient and the intended plan as seen in the above orders. The patient has received an after-visit summary and questions were answered concerning future plans. I have discussed medication side effects and warnings with the patient as well. I have reviewed the plan of care with the patient, accepted their input and they are in agreement with the treatment goals. Reviewed plan of care. Patient has provided input and agrees with goals. Follow-up and Dispositions    Return in about 3 months (around 12/20/2022).          Julien Bustillos MD

## 2022-09-20 NOTE — PROGRESS NOTES
Identified pt with two pt identifiers(name and ). Chief Complaint   Patient presents with    Follow-up    Diabetes Care Management     Neck Pain     On left side of neck - when yawning         3 most recent PHQ Screens 2022   Little interest or pleasure in doing things Not at all   Feeling down, depressed, irritable, or hopeless Not at all   Total Score PHQ 2 0        Vitals:    22 1041 22 1049   BP: (!) 159/95 (!) 165/87   Pulse: 60    Resp: 18    Temp: 97.5 °F (36.4 °C)    TempSrc: Temporal    SpO2: 97%    Weight: 319 lb 12.8 oz (145.1 kg)    Height: 5' 8\" (1.727 m)        Health Maintenance Due   Topic Date Due    Hepatitis C Screening  Never done    Pneumococcal 0-64 years (1 - PCV) Never done    Foot Exam Q1  Never done    Eye Exam Retinal or Dilated  Never done    DTaP/Tdap/Td series (1 - Tdap) Never done    Cervical cancer screen  Never done    Colorectal Cancer Screening Combo  Never done    Shingrix Vaccine Age 50> (1 of 2) Never done    MICROALBUMIN Q1  2016    Breast Cancer Screen Mammogram  2021    COVID-19 Vaccine (4 - Booster for Pfizer series) 2022    Flu Vaccine (1) 2022        1. Have you been to the ER, urgent care clinic since your last visit? Hospitalized since your last visit? No    2. Have you seen or consulted any other health care providers outside of the [address] [place] [address] since your last visit? Include any pap smears or colon screening. No    3. For patients aged 39-70: Has the patient had a colonoscopy / FIT/ Cologuard? No      If the patient is female:    4. For patients aged 41-77: Has the patient had a mammogram within the past 2 years? No      5. For patients aged 21-65: Has the patient had a pap smear?  No

## 2022-11-23 DIAGNOSIS — I10 HTN (HYPERTENSION), MALIGNANT: ICD-10-CM

## 2022-11-27 RX ORDER — METOPROLOL SUCCINATE 25 MG/1
TABLET, EXTENDED RELEASE ORAL
Qty: 90 TABLET | Refills: 0 | Status: SHIPPED | OUTPATIENT
Start: 2022-11-27

## 2022-12-13 ENCOUNTER — OFFICE VISIT (OUTPATIENT)
Dept: PRIMARY CARE CLINIC | Age: 57
End: 2022-12-13
Payer: COMMERCIAL

## 2022-12-13 VITALS
HEIGHT: 68 IN | HEART RATE: 61 BPM | OXYGEN SATURATION: 99 % | RESPIRATION RATE: 20 BRPM | SYSTOLIC BLOOD PRESSURE: 136 MMHG | WEIGHT: 293 LBS | DIASTOLIC BLOOD PRESSURE: 85 MMHG | TEMPERATURE: 97.1 F | BODY MASS INDEX: 44.41 KG/M2

## 2022-12-13 DIAGNOSIS — I51.7 LVH (LEFT VENTRICULAR HYPERTROPHY): ICD-10-CM

## 2022-12-13 DIAGNOSIS — Z12.11 COLON CANCER SCREENING: ICD-10-CM

## 2022-12-13 DIAGNOSIS — I10 HTN (HYPERTENSION), MALIGNANT: Primary | ICD-10-CM

## 2022-12-13 DIAGNOSIS — D35.02 ADRENAL ADENOMA, LEFT: ICD-10-CM

## 2022-12-13 DIAGNOSIS — Z12.31 ENCOUNTER FOR SCREENING MAMMOGRAM FOR MALIGNANT NEOPLASM OF BREAST: ICD-10-CM

## 2022-12-13 DIAGNOSIS — E11.9 TYPE 2 DIABETES MELLITUS WITHOUT COMPLICATION, WITHOUT LONG-TERM CURRENT USE OF INSULIN (HCC): ICD-10-CM

## 2022-12-13 LAB
ALBUMIN SERPL-MCNC: 3.3 G/DL (ref 3.5–5)
ALBUMIN/GLOB SERPL: 0.8 {RATIO} (ref 1.1–2.2)
ALP SERPL-CCNC: 90 U/L (ref 45–117)
ALT SERPL-CCNC: 17 U/L (ref 12–78)
ANION GAP SERPL CALC-SCNC: 3 MMOL/L (ref 5–15)
AST SERPL-CCNC: 16 U/L (ref 15–37)
BILIRUB SERPL-MCNC: 0.4 MG/DL (ref 0.2–1)
BUN SERPL-MCNC: 10 MG/DL (ref 6–20)
BUN/CREAT SERPL: 12 (ref 12–20)
CALCIUM SERPL-MCNC: 9 MG/DL (ref 8.5–10.1)
CHLORIDE SERPL-SCNC: 107 MMOL/L (ref 97–108)
CO2 SERPL-SCNC: 33 MMOL/L (ref 21–32)
CORTIS AM PEAK SERPL-MCNC: 8.7 UG/DL (ref 4.3–22.45)
CREAT SERPL-MCNC: 0.84 MG/DL (ref 0.55–1.02)
EST. AVERAGE GLUCOSE BLD GHB EST-MCNC: 123 MG/DL
GLOBULIN SER CALC-MCNC: 4.1 G/DL (ref 2–4)
GLUCOSE SERPL-MCNC: 128 MG/DL (ref 65–100)
HBA1C MFR BLD: 5.9 % (ref 4–5.6)
POTASSIUM SERPL-SCNC: 4.5 MMOL/L (ref 3.5–5.1)
PROT SERPL-MCNC: 7.4 G/DL (ref 6.4–8.2)
SODIUM SERPL-SCNC: 143 MMOL/L (ref 136–145)

## 2022-12-13 PROCEDURE — 3044F HG A1C LEVEL LT 7.0%: CPT | Performed by: INTERNAL MEDICINE

## 2022-12-13 PROCEDURE — 3074F SYST BP LT 130 MM HG: CPT | Performed by: INTERNAL MEDICINE

## 2022-12-13 PROCEDURE — 99213 OFFICE O/P EST LOW 20 MIN: CPT | Performed by: INTERNAL MEDICINE

## 2022-12-13 PROCEDURE — 3078F DIAST BP <80 MM HG: CPT | Performed by: INTERNAL MEDICINE

## 2022-12-13 NOTE — PROGRESS NOTES
Ilsa Do is a 59 y.o.  female and presents with     Chief Complaint   Patient presents with    Hypertension     Need referral for colonoscopy, mammogram and OBGYN for pap       Taking meds for HTN  It has improved. HAs not done MMG or labs  Feels okay. Family says that she snores. Past Medical History:   Diagnosis Date    HTN (hypertension)     Vertigo      Past Surgical History:   Procedure Laterality Date    HX LAP CHOLECYSTECTOMY  10/04/2021    by Dr. Adilia Galindo at [facility]     Current Outpatient Medications   Medication Sig    metoprolol succinate (TOPROL-XL) 25 mg XL tablet TAKE 1 TABLET BY MOUTH EVERY DAY    amLODIPine (NORVASC) 10 mg tablet Take 1 Tablet by mouth nightly. polyethylene glycol (MIRALAX) 17 gram/dose powder Take 17 g by mouth daily. valsartan (DIOVAN) 160 mg tablet Take 1 Tablet by mouth two (2) times a day. chlorthalidone (HYGROTON) 25 mg tablet Take 1 Tablet by mouth daily. metFORMIN (GLUCOPHAGE) 500 mg tablet Take 1 Tablet by mouth two (2) times daily (with meals). OTHER     elderberry fruit (ELDERBERRY PO) Take  by mouth. No current facility-administered medications for this visit.      Health Maintenance   Topic Date Due    Hepatitis C Screening  Never done    Pneumococcal 0-64 years (1 - PCV) Never done    Foot Exam Q1  Never done    Eye Exam Retinal or Dilated  Never done    Hepatitis B Vaccine (1 of 3 - Risk 3-dose series) Never done    DTaP/Tdap/Td series (1 - Tdap) Never done    Cervical cancer screen  Never done    Colorectal Cancer Screening Combo  Never done    Shingrix Vaccine Age 50> (1 of 2) Never done    MICROALBUMIN Q1  04/29/2016    Breast Cancer Screen Mammogram  09/09/2021    Lipid Screen  03/28/2023    Depression Screen  12/13/2023    A1C test (Diabetic or Prediabetic)  12/13/2023    Flu Vaccine  Completed    COVID-19 Vaccine  Completed     Immunization History   Administered Date(s) Administered    COVID-19, PFIZER PURPLE top, DILUTE for use, (age 15 y+), IM, 30mcg/0.3mL 01/21/2021, 02/11/2021, 10/13/2021, 06/10/2022, 09/23/2022    Influenza Vaccine 10/29/2022    Influenza, FLUARIX, FLULAVAL, FLUZONE (age 10 mo+) AND AFLURIA, (age 1 y+), PF, 0.5mL 02/09/2018     No LMP recorded. (Menstrual status: Menopause). Allergies and Intolerances: Allergies   Allergen Reactions    Claritin [Loratadine] Other (comments)     Tachycardia    Lisinopril Other (comments)     Hoarse voice     Losartan Nausea Only    Tylenol [Acetaminophen] Vertigo    Hydrocodone Hives    Oxycodone-Acetaminophen Anxiety     hallucinations       Family History:   Family History   Problem Relation Age of Onset    No Known Problems Mother     No Known Problems Father        Social History:   She  reports that she has never smoked. She has never used smokeless tobacco.  She  reports current alcohol use.             Review of Systems:   General: negative for - chills, fatigue, fever, weight change  Psych: negative for - anxiety, depression, irritability or mood swings  ENT: negative for - headaches, hearing change, nasal congestion, oral lesions, sneezing or sore throat  Heme/ Lymph: negative for - bleeding problems, bruising, pallor or swollen lymph nodes  Endo: negative for - hot flashes, polydipsia/polyuria or temperature intolerance  Resp: negative for - cough, shortness of breath or wheezing  CV: negative for - chest pain, edema or palpitations  GI: negative for - abdominal pain, change in bowel habits, constipation, diarrhea or nausea/vomiting  : negative for - dysuria, hematuria, incontinence, pelvic pain or vulvar/vaginal symptoms  MSK: negative for - joint pain, joint swelling or muscle pain  Neuro: negative for - confusion, headaches, seizures or weakness  Derm: negative for - dry skin, hair changes, rash or skin lesion changes          Physical:   Vitals:   Vitals:    12/13/22 0822 12/13/22 0838   BP: (!) 146/86 136/85   Pulse: 61    Resp: 20    Temp: 97.1 °F (36.2 °C)    TempSrc: Temporal    SpO2: 99%    Weight: 318 lb (144.2 kg)    Height: 5' 8\" (1.727 m)            Exam:   HEENT- atraumatic,normocephalic, awake, oriented, well nourished  Neck - supple,no enlarged lymph nodes, no JVD, no thyromegaly  Chest- CTA, no rhonchi, no crackles  Heart- rrr, no murmurs / gallop/rub  Abdomen- soft,BS+,NT, no hepatosplenomegaly  Ext - no c/c/edema   Neuro- no focal deficits. Power 5/5 all extremities  Skin - warm,dry, no obvious rashes. Review of Data:   LABS:   Lab Results   Component Value Date/Time    WBC 8.4 03/28/2022 11:22 AM    HGB 13.2 03/28/2022 11:22 AM    HCT 42.9 03/28/2022 11:22 AM    PLATELET 635 79/64/6540 11:22 AM     Lab Results   Component Value Date/Time    Sodium 143 12/13/2022 08:48 AM    Potassium 4.5 12/13/2022 08:48 AM    Chloride 107 12/13/2022 08:48 AM    CO2 33 (H) 12/13/2022 08:48 AM    Glucose 128 (H) 12/13/2022 08:48 AM    BUN 10 12/13/2022 08:48 AM    Creatinine 0.84 12/13/2022 08:48 AM     Lab Results   Component Value Date/Time    Cholesterol, total 178 03/28/2022 11:22 AM    HDL Cholesterol 48 03/28/2022 11:22 AM    LDL, calculated 116.4 (H) 03/28/2022 11:22 AM    Triglyceride 68 03/28/2022 11:22 AM     No components found for: GPT        Impression / Plan:        ICD-10-CM ICD-9-CM    1. HTN (hypertension), malignant  I10 401.0       2. Adrenal adenoma, left  D35.02 227.0 CORTISOL, AM      CANCELED: RENIN ACTIVITY      3. LVH (left ventricular hypertrophy)  I51.7 429.3       4. Encounter for screening mammogram for malignant neoplasm of breast  Z12.31 V76.12 LEWIS 3D CARLOS W MAMMO BI SCREENING INCL CAD      5. Colon cancer screening  Z12.11 V76.51 REFERRAL TO GASTROENTEROLOGY      6. Type 2 diabetes mellitus without complication, without long-term current use of insulin (HCC)  E11.9 250.00 HEMOGLOBIN A1C WITH EAG      METABOLIC PANEL, COMPREHENSIVE        Asked pt to check with family if she snores. Explained to patient risk benefits of the medications. Advised patient to stop meds if having any side effects. Pt verbalized understanding of the instructions. I have discussed the diagnosis with the patient and the intended plan as seen in the above orders. The patient has received an after-visit summary and questions were answered concerning future plans. I have discussed medication side effects and warnings with the patient as well. I have reviewed the plan of care with the patient, accepted their input and they are in agreement with the treatment goals. Reviewed plan of care. Patient has provided input and agrees with goals. Follow-up and Dispositions    Return in about 4 months (around 4/13/2023).          Craig Ray MD

## 2022-12-13 NOTE — PROGRESS NOTES
Chief Complaint   Patient presents with    Hypertension     Need referral for colonoscopy, mammogram and OBGYN for pap         Visit Vitals  BP (!) 146/86 (BP 1 Location: Left upper arm, BP Patient Position: Sitting)   Pulse 61   Temp 97.1 °F (36.2 °C) (Temporal)   Resp 20   Ht 5' 8\" (1.727 m)   Wt 318 lb (144.2 kg)   SpO2 99%   BMI 48.35 kg/m²        Health Maintenance Due   Topic    Hepatitis C Screening     Pneumococcal 0-64 years (1 - PCV)    Foot Exam Q1     Eye Exam Retinal or Dilated     Hepatitis B Vaccine (1 of 3 - Risk 3-dose series)    DTaP/Tdap/Td series (1 - Tdap)    Cervical cancer screen     Colorectal Cancer Screening Combo     Shingrix Vaccine Age 50> (1 of 2)    MICROALBUMIN Q1     Breast Cancer Screen Mammogram         1. \"Have you been to the ER, urgent care clinic since your last visit? Hospitalized since your last visit? \" No    2. \"Have you seen or consulted any other health care providers outside of the [address] [place] [address] since your last visit? \" No     3. For patients aged 39-70: Has the patient had a colonoscopy / FIT/ Cologuard? Yes - Care Gap present. Rooming MA/LPN to request most recent results      If the patient is female:    4. For patients aged 41-77: Has the patient had a mammogram within the past 2 years? Yes - Care Gap present. Rooming MA/LPN to request most recent results      5. For patients aged 21-65: Has the patient had a pap smear? Yes - Care Gap present.  Rooming MA/LPN to request most recent results

## 2022-12-14 NOTE — PROGRESS NOTES
Serum cortisol level is normal.  Hemoglobin A1c is 5.9.   Patient is in the prediabetic range and would recommend diet and exercise  Kidney and liver function are normal

## 2022-12-18 LAB — RENIN PLAS-CCNC: <0.167 NG/ML/HR (ref 0.17–5.38)

## 2023-01-29 DIAGNOSIS — I10 HTN (HYPERTENSION), MALIGNANT: ICD-10-CM

## 2023-01-30 RX ORDER — METOPROLOL SUCCINATE 25 MG/1
TABLET, EXTENDED RELEASE ORAL
Qty: 90 TABLET | Refills: 0 | Status: SHIPPED | OUTPATIENT
Start: 2023-01-30

## 2023-04-07 ENCOUNTER — TELEPHONE (OUTPATIENT)
Dept: PRIMARY CARE CLINIC | Age: 58
End: 2023-04-07

## 2023-05-22 RX ORDER — VALSARTAN 160 MG/1
160 TABLET ORAL 2 TIMES DAILY
COMMUNITY
Start: 2023-04-12

## 2023-05-22 RX ORDER — AMLODIPINE BESYLATE 10 MG/1
1 TABLET ORAL NIGHTLY
COMMUNITY
Start: 2023-04-12

## 2023-05-22 RX ORDER — METOPROLOL SUCCINATE 25 MG/1
25 TABLET, EXTENDED RELEASE ORAL DAILY
COMMUNITY
Start: 2023-04-12

## 2023-05-22 RX ORDER — CHLORTHALIDONE 25 MG/1
25 TABLET ORAL DAILY
COMMUNITY
Start: 2023-04-12

## 2023-09-28 ENCOUNTER — HOSPITAL ENCOUNTER (EMERGENCY)
Facility: HOSPITAL | Age: 58
End: 2023-09-28
Payer: COMMERCIAL

## 2023-09-28 ENCOUNTER — HOSPITAL ENCOUNTER (EMERGENCY)
Facility: HOSPITAL | Age: 58
Discharge: HOME | End: 2023-09-28
Attending: EMERGENCY MEDICINE
Payer: COMMERCIAL

## 2023-09-28 VITALS
OXYGEN SATURATION: 96 % | DIASTOLIC BLOOD PRESSURE: 82 MMHG | RESPIRATION RATE: 16 BRPM | HEART RATE: 65 BPM | SYSTOLIC BLOOD PRESSURE: 188 MMHG | TEMPERATURE: 98.7 F

## 2023-09-28 DIAGNOSIS — R51.9 ACUTE NONINTRACTABLE HEADACHE, UNSPECIFIED HEADACHE TYPE: ICD-10-CM

## 2023-09-28 DIAGNOSIS — J06.9 UPPER RESPIRATORY TRACT INFECTION, UNSPECIFIED TYPE: Primary | ICD-10-CM

## 2023-09-28 LAB
ALBUMIN SERPL-MCNC: 3.4 G/DL (ref 3.5–5)
ALBUMIN/GLOB SERPL: 0.6 (ref 1.1–2.2)
ALP SERPL-CCNC: 88 U/L (ref 45–117)
ALT SERPL-CCNC: 19 U/L (ref 12–78)
ANION GAP SERPL CALC-SCNC: 3 MMOL/L (ref 5–15)
AST SERPL-CCNC: 16 U/L (ref 15–37)
BASOPHILS # BLD: 0 K/UL (ref 0–0.1)
BASOPHILS NFR BLD: 1 % (ref 0–1)
BILIRUB SERPL-MCNC: 0.3 MG/DL (ref 0.2–1)
BUN SERPL-MCNC: 14 MG/DL (ref 6–20)
BUN/CREAT SERPL: 16 (ref 12–20)
CALCIUM SERPL-MCNC: 9.6 MG/DL (ref 8.5–10.1)
CHLORIDE SERPL-SCNC: 106 MMOL/L (ref 97–108)
CO2 SERPL-SCNC: 31 MMOL/L (ref 21–32)
CREAT SERPL-MCNC: 0.9 MG/DL (ref 0.55–1.02)
DIFFERENTIAL METHOD BLD: ABNORMAL
EOSINOPHIL # BLD: 0.3 K/UL (ref 0–0.4)
EOSINOPHIL NFR BLD: 4 % (ref 0–7)
ERYTHROCYTE [DISTWIDTH] IN BLOOD BY AUTOMATED COUNT: 14.1 % (ref 11.5–14.5)
GLOBULIN SER CALC-MCNC: 5.5 G/DL (ref 2–4)
GLUCOSE SERPL-MCNC: 98 MG/DL (ref 65–100)
HCT VFR BLD AUTO: 42.4 % (ref 35–47)
HGB BLD-MCNC: 12.9 G/DL (ref 11.5–16)
IMM GRANULOCYTES # BLD AUTO: 0 K/UL (ref 0–0.04)
IMM GRANULOCYTES NFR BLD AUTO: 0 % (ref 0–0.5)
LYMPHOCYTES # BLD: 2 K/UL (ref 0.8–3.5)
LYMPHOCYTES NFR BLD: 26 % (ref 12–49)
MCH RBC QN AUTO: 27.9 PG (ref 26–34)
MCHC RBC AUTO-ENTMCNC: 30.4 G/DL (ref 30–36.5)
MCV RBC AUTO: 91.6 FL (ref 80–99)
MONOCYTES # BLD: 0.7 K/UL (ref 0–1)
MONOCYTES NFR BLD: 9 % (ref 5–13)
NEUTS SEG # BLD: 4.7 K/UL (ref 1.8–8)
NEUTS SEG NFR BLD: 60 % (ref 32–75)
NRBC # BLD: 0 K/UL (ref 0–0.01)
NRBC BLD-RTO: 0 PER 100 WBC
PLATELET # BLD AUTO: 315 K/UL (ref 150–400)
PMV BLD AUTO: 8.8 FL (ref 8.9–12.9)
POTASSIUM SERPL-SCNC: 3.8 MMOL/L (ref 3.5–5.1)
PROT SERPL-MCNC: 8.9 G/DL (ref 6.4–8.2)
RBC # BLD AUTO: 4.63 M/UL (ref 3.8–5.2)
S PYO AG THROAT QL: NEGATIVE
SODIUM SERPL-SCNC: 140 MMOL/L (ref 136–145)
WBC # BLD AUTO: 7.8 K/UL (ref 3.6–11)

## 2023-09-28 PROCEDURE — 36415 COLL VENOUS BLD VENIPUNCTURE: CPT

## 2023-09-28 PROCEDURE — 87635 SARS-COV-2 COVID-19 AMP PRB: CPT

## 2023-09-28 PROCEDURE — 6360000004 HC RX CONTRAST MEDICATION: Performed by: RADIOLOGY

## 2023-09-28 PROCEDURE — 99285 EMERGENCY DEPT VISIT HI MDM: CPT

## 2023-09-28 PROCEDURE — 85025 COMPLETE CBC W/AUTO DIFF WBC: CPT

## 2023-09-28 PROCEDURE — 70491 CT SOFT TISSUE NECK W/DYE: CPT

## 2023-09-28 PROCEDURE — 80053 COMPREHEN METABOLIC PANEL: CPT

## 2023-09-28 PROCEDURE — 87070 CULTURE OTHR SPECIMN AEROBIC: CPT

## 2023-09-28 PROCEDURE — 70450 CT HEAD/BRAIN W/O DYE: CPT

## 2023-09-28 PROCEDURE — 87880 STREP A ASSAY W/OPTIC: CPT

## 2023-09-28 RX ORDER — GUAIFENESIN 600 MG/1
600 TABLET, EXTENDED RELEASE ORAL 2 TIMES DAILY
Qty: 30 TABLET | Refills: 0 | Status: SHIPPED | OUTPATIENT
Start: 2023-09-28 | End: 2023-10-13

## 2023-09-28 RX ORDER — FLUTICASONE PROPIONATE 50 MCG
2 SPRAY, SUSPENSION (ML) NASAL DAILY
Qty: 16 G | Refills: 0 | Status: SHIPPED | OUTPATIENT
Start: 2023-09-28

## 2023-09-28 RX ORDER — OXYMETAZOLINE HYDROCHLORIDE 0.05 G/100ML
2 SPRAY NASAL 2 TIMES DAILY
Qty: 1 EACH | Refills: 0 | Status: SHIPPED | OUTPATIENT
Start: 2023-09-28 | End: 2023-10-28

## 2023-09-28 RX ORDER — CETIRIZINE HYDROCHLORIDE 10 MG/1
10 TABLET ORAL DAILY
Qty: 30 TABLET | Refills: 0 | Status: SHIPPED | OUTPATIENT
Start: 2023-09-28 | End: 2023-10-28

## 2023-09-28 RX ADMIN — IOPAMIDOL 100 ML: 612 INJECTION, SOLUTION INTRAVENOUS at 18:13

## 2023-09-28 ASSESSMENT — PAIN DESCRIPTION - LOCATION: LOCATION: HEAD;NECK

## 2023-09-28 ASSESSMENT — PAIN SCALES - GENERAL: PAINLEVEL_OUTOF10: 10

## 2023-09-28 ASSESSMENT — ENCOUNTER SYMPTOMS: SORE THROAT: 1

## 2023-09-28 ASSESSMENT — PAIN - FUNCTIONAL ASSESSMENT: PAIN_FUNCTIONAL_ASSESSMENT: 0-10

## 2023-09-28 NOTE — ED TRIAGE NOTES
She says she is having pain in her head and neck and says it is worse when she yawns. She says it started at least two days ago. She says she has intermittent nausea, once last week and once in the past two days. She denies vomiting. She denies any falls or head injuries.

## 2023-09-28 NOTE — DISCHARGE INSTRUCTIONS
Discussed visit today. Please follow-up with PCP as needed. Return to the ER with any worsening of symptoms.

## 2023-09-28 NOTE — ED NOTES
Patient left ED in no acute distress, alert and oriented x4. Patient was encouraged to come back if symptoms get worse. Patient was provided with discharge instructions and prescriptions. All questions were answered. Patient left ambulatory.          Connor Ren LPN  79/49/20 [id number]

## 2023-09-29 LAB
SARS-COV-2 RNA RESP QL NAA+PROBE: NOT DETECTED
SOURCE: NORMAL

## 2023-09-29 NOTE — ED PROVIDER NOTES
No data to display                (Please note that portions of this note were completed with a voice recognition program.  Efforts were made to edit the dictations but occasionally words are mis-transcribed.)    Jeb Fischer PA-C (electronically signed)  Physician Assistant            Jeb Fischer PA-C  09/28/23 2013

## 2023-09-30 LAB
BACTERIA SPEC CULT: NORMAL
SERVICE CMNT-IMP: NORMAL

## 2023-12-04 DIAGNOSIS — I10 ESSENTIAL (PRIMARY) HYPERTENSION: ICD-10-CM

## 2023-12-04 DIAGNOSIS — I51.7 CARDIOMEGALY: ICD-10-CM

## 2023-12-04 RX ORDER — METOPROLOL SUCCINATE 25 MG/1
25 TABLET, EXTENDED RELEASE ORAL DAILY
Qty: 30 TABLET | Refills: 0 | Status: SHIPPED | OUTPATIENT
Start: 2023-12-04 | End: 2024-01-23

## 2023-12-04 RX ORDER — AMLODIPINE BESYLATE 10 MG/1
TABLET ORAL NIGHTLY
Qty: 30 TABLET | Refills: 0 | Status: SHIPPED | OUTPATIENT
Start: 2023-12-04 | End: 2024-01-23

## 2024-01-21 DIAGNOSIS — I51.7 CARDIOMEGALY: ICD-10-CM

## 2024-01-21 DIAGNOSIS — I10 ESSENTIAL (PRIMARY) HYPERTENSION: ICD-10-CM

## 2024-01-23 RX ORDER — AMLODIPINE BESYLATE 10 MG/1
10 TABLET ORAL NIGHTLY
Qty: 30 TABLET | Refills: 0 | Status: SHIPPED | OUTPATIENT
Start: 2024-01-23

## 2024-01-23 RX ORDER — METOPROLOL SUCCINATE 25 MG/1
25 TABLET, EXTENDED RELEASE ORAL DAILY
Qty: 30 TABLET | Refills: 0 | Status: SHIPPED | OUTPATIENT
Start: 2024-01-23

## 2024-01-28 ENCOUNTER — HOSPITAL ENCOUNTER (EMERGENCY)
Facility: HOSPITAL | Age: 59
Discharge: HOME | End: 2024-01-28
Attending: EMERGENCY MEDICINE
Payer: COMMERCIAL

## 2024-01-28 VITALS
RESPIRATION RATE: 20 BRPM | DIASTOLIC BLOOD PRESSURE: 86 MMHG | SYSTOLIC BLOOD PRESSURE: 178 MMHG | TEMPERATURE: 99.8 F | OXYGEN SATURATION: 96 % | HEART RATE: 86 BPM

## 2024-01-28 DIAGNOSIS — J11.1 INFLUENZA WITH RESPIRATORY MANIFESTATION OTHER THAN PNEUMONIA: Primary | ICD-10-CM

## 2024-01-28 LAB
FLUAV AG NPH QL IA: POSITIVE
FLUBV AG NOSE QL IA: NEGATIVE
SARS-COV-2 RDRP RESP QL NAA+PROBE: NOT DETECTED
SOURCE: NORMAL

## 2024-01-28 PROCEDURE — 87804 INFLUENZA ASSAY W/OPTIC: CPT

## 2024-01-28 PROCEDURE — 87635 SARS-COV-2 COVID-19 AMP PRB: CPT

## 2024-01-28 PROCEDURE — 99283 EMERGENCY DEPT VISIT LOW MDM: CPT

## 2024-01-28 RX ORDER — FLUTICASONE PROPIONATE 50 MCG
2 SPRAY, SUSPENSION (ML) NASAL DAILY
Qty: 16 G | Refills: 0 | Status: SHIPPED | OUTPATIENT
Start: 2024-01-28 | End: 2024-02-04

## 2024-01-28 RX ORDER — IBUPROFEN 600 MG/1
600 TABLET ORAL EVERY 6 HOURS PRN
Qty: 20 TABLET | Refills: 0 | Status: SHIPPED | OUTPATIENT
Start: 2024-01-28 | End: 2024-02-02

## 2024-01-28 ASSESSMENT — PAIN - FUNCTIONAL ASSESSMENT: PAIN_FUNCTIONAL_ASSESSMENT: NONE - DENIES PAIN

## 2024-01-28 NOTE — ED TRIAGE NOTES
Pt reports generalized weakness, nasal congestion, nausea since this am. Pt was exposed to the flu at work. Pt denies SOB, n/v/d, and CP.

## 2024-01-28 NOTE — ED PROVIDER NOTES
[facility] EMERGENCY DEP  EMERGENCY DEPARTMENT ENCOUNTER      Pt Name: Jenise Guillen  MRN: [id number]  Birthdate 1965  Date of evaluation: 1/28/2024  Provider: ANURADHA العراقي - PIPPA   Patient is a 58 y.o. female with pmhx of hypertension, vertigo who presents today with complaints of nasal congestion.  Associated rhinorrhea and fatigue.  States this began within the last few hours.  Denies cough, sore throat, shortness of breath, chest pain, wheezing, nausea, vomiting, diarrhea, syncope.  Able to eat and drink without issue and is making good urine output.  She works in the [place] and states that many of the residents have the flu and is concerned she may have the same.  Does state that she is vaccinated for flu    There are no other complaints, changes or physical findings at this time.      PAST MEDICAL HISTORY     Past Medical History:   Diagnosis Date    HTN (hypertension)     Vertigo          SURGICAL HISTORY       Past Surgical History:   Procedure Laterality Date    CHOLECYSTECTOMY, LAPAROSCOPIC  10/04/2021    by Dr. Duran Fairbanks at [facility]         CURRENT MEDICATIONS       Discharge Medication List as of 1/28/2024  3:03 PM        CONTINUE these medications which have NOT CHANGED    Details   amLODIPine (NORVASC) 10 MG tablet TAKE 1 TABLET BY MOUTH EVERY DAY AT NIGHT, Disp-30 tablet, R-0Normal      metoprolol succinate (TOPROL XL) 25 MG extended release tablet TAKE 1 TABLET BY MOUTH EVERY DAY, Disp-30 tablet, R-0Normal      !! fluticasone (FLONASE) 50 MCG/ACT nasal spray 2 sprays by Each Nostril route daily, Disp-16 g, R-0Normal      chlorthalidone (HYGROTON) 25 MG tablet Take 1 tablet by mouth dailyHistorical Med      metFORMIN (GLUCOPHAGE) 500 MG tablet Take 1 tablet by mouth 2 times daily (with meals)Historical Med      valsartan (DIOVAN) 160 MG tablet Take 1 tablet by mouth 2 times dailyHistorical Med       !! - Potential duplicate medications found. Please discuss with provider.          ALLERGIES

## 2024-01-28 NOTE — DISCHARGE INSTRUCTIONS
For pain management, both Tylenol and ibuprofen (motrin) can be taken. They have a different chemical composition and may give more relief together than can be provided using either alone.  How to alternate Ibuprofen and Tylenol:  ? With food, You will take a dose of pain medication every three hours.  ? Start by taking 650 mg of Tylenol   ? 3 hours later take 600 mg of Motrin   ? 3 hours after taking the Motrin take 650 mg of Tylenol  ? 3 hours after that take 600 mg of Motrin.   ? You can continue to alternate Ibuprofen and Tylenol, up to the maximum doses of each medicine, but do not exceed the maximum dose of each.  ? Be sure to maintain proper dosing intervals between successive doses of the same medication (at least 4 hours)    Do not take more than 4,000 mg of Tylenol or 3,200 mg of Motrin in a 24-hour period!          Thank you for allowing us to provide you with medical care today.  We realize that you have many choices for your emergency care needs.  We thank you for choosing [facility].  Please choose us in the future for any continued health care needs.     We hope we addressed all of your medical concerns. We strive to provide excellent quality care in the Emergency Department.  Anything less than excellent does not meet our expectations.     The exam and treatment you received in the Emergency Department were for an emergent problem and are not intended as complete care. It is important that you follow up with a doctor, nurse practitioner, or physician’s assistant for ongoing care. If your symptoms worsen or you do not improve as expected and you are unable to reach your usual health care provider, you should return to the Emergency Department. We are available 24 hours a day.     Take this sheet with you when you go to your follow-up visit.     If you have any problem arranging the follow-up visit, contact the Emergency Department immediately.     Make an appointment your family

## 2024-01-29 DIAGNOSIS — I51.7 CARDIOMEGALY: ICD-10-CM

## 2024-01-29 DIAGNOSIS — I10 ESSENTIAL (PRIMARY) HYPERTENSION: ICD-10-CM

## 2024-01-29 RX ORDER — METOPROLOL SUCCINATE 25 MG/1
25 TABLET, EXTENDED RELEASE ORAL DAILY
Qty: 30 TABLET | Refills: 0 | Status: SHIPPED | OUTPATIENT
Start: 2024-01-29

## 2024-01-29 RX ORDER — AMLODIPINE BESYLATE 10 MG/1
10 TABLET ORAL NIGHTLY
Qty: 30 TABLET | Refills: 0 | Status: SHIPPED | OUTPATIENT
Start: 2024-01-29

## 2024-01-31 ENCOUNTER — TELEMEDICINE (OUTPATIENT)
Dept: PRIMARY CARE CLINIC | Facility: CLINIC | Age: 59
End: 2024-01-31
Payer: COMMERCIAL

## 2024-01-31 DIAGNOSIS — J10.1 INFLUENZA A: ICD-10-CM

## 2024-01-31 DIAGNOSIS — R05.1 ACUTE COUGH: Primary | ICD-10-CM

## 2024-01-31 PROCEDURE — 99213 OFFICE O/P EST LOW 20 MIN: CPT | Performed by: INTERNAL MEDICINE

## 2024-01-31 RX ORDER — BENZONATATE 100 MG/1
100 CAPSULE ORAL 3 TIMES DAILY PRN
Qty: 30 CAPSULE | Refills: 0 | Status: SHIPPED | OUTPATIENT
Start: 2024-01-31 | End: 2024-02-10

## 2024-01-31 SDOH — ECONOMIC STABILITY: INCOME INSECURITY: HOW HARD IS IT FOR YOU TO PAY FOR THE VERY BASICS LIKE FOOD, HOUSING, MEDICAL CARE, AND HEATING?: PATIENT DECLINED

## 2024-01-31 SDOH — ECONOMIC STABILITY: HOUSING INSECURITY
IN THE LAST 12 MONTHS, WAS THERE A TIME WHEN YOU DID NOT HAVE A STEADY PLACE TO SLEEP OR SLEPT IN A SHELTER (INCLUDING NOW)?: PATIENT DECLINED

## 2024-01-31 SDOH — ECONOMIC STABILITY: FOOD INSECURITY: WITHIN THE PAST 12 MONTHS, THE FOOD YOU BOUGHT JUST DIDN'T LAST AND YOU DIDN'T HAVE MONEY TO GET MORE.: PATIENT DECLINED

## 2024-01-31 SDOH — ECONOMIC STABILITY: FOOD INSECURITY: WITHIN THE PAST 12 MONTHS, YOU WORRIED THAT YOUR FOOD WOULD RUN OUT BEFORE YOU GOT MONEY TO BUY MORE.: PATIENT DECLINED

## 2024-01-31 ASSESSMENT — PATIENT HEALTH QUESTIONNAIRE - PHQ9
SUM OF ALL RESPONSES TO PHQ QUESTIONS 1-9: 0
2. FEELING DOWN, DEPRESSED OR HOPELESS: 0
SUM OF ALL RESPONSES TO PHQ QUESTIONS 1-9: 0
SUM OF ALL RESPONSES TO PHQ9 QUESTIONS 1 & 2: 0
1. LITTLE INTEREST OR PLEASURE IN DOING THINGS: 0

## 2024-01-31 NOTE — PROGRESS NOTES
\"Have you been to the ER, urgent care clinic since your last visit?  Hospitalized since your last visit?\"    YES - When: approximately 4 days ago.  Where and Why: [facility] for FLU.    “Have you seen or consulted any other health care providers outside of [facility] since your last visit?”    NO    “Have you had a colorectal cancer screening such as a colonoscopy/FIT/Cologuard?    NO     Have you had a mammogram?”   YES - Where: [facility]  Nurse/CMA to request most recent records if not in the chart     “Have you had a pap smear?”    NO  Last Pap was years ago 2010

## 2024-01-31 NOTE — PROGRESS NOTES
Jenise Guillen, was evaluated through a synchronous (real-time) audio-video encounter. The patient (or guardian if applicable) is aware that this is a billable service, which includes applicable co-pays. This Virtual Visit was conducted with patient's (and/or legal guardian's) consent. Patient identification was verified, and a caregiver was present when appropriate.   The patient was located at Home: [address] [place] [address]. 1  [facility] [id number]  Provider was located at Facility (Appt Dept): [address] [place] [id number]      Jenise Guillen (:  1965) is a Established patient, presenting virtually for evaluation of the following:    Assessment & Plan   Below is the assessment and plan developed based on review of pertinent history, physical exam, labs, studies, and medications.  1. Acute cough  -     benzonatate (TESSALON) 100 MG capsule; Take 1 capsule by mouth 3 times daily as needed for Cough, Disp-30 capsule, R-0Normal  2. Influenza A    No follow-ups on file.       Subjective   HPI  Review of Systems     Patient reports that she tested positive for flu 3 days back.  At that time she had cough with yellow-green phlegm however it seems to be getting better  She works in a nursing home.  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  Had mild breathing discomfort but that is improving  And has mild residual cough    Objective   Patient-Reported Vitals  No data recorded     Physical Exam         On this date 2024 I have spent 15 minutes reviewing previous notes, test results and face to face (virtual) with the patient discussing the diagnosis and importance of compliance with the treatment plan as well as documenting on the day of the visit.    --Crispin Peters MD

## 2024-02-02 ENCOUNTER — TELEPHONE (OUTPATIENT)
Dept: PRIMARY CARE CLINIC | Facility: CLINIC | Age: 59
End: 2024-02-02

## 2024-02-02 NOTE — TELEPHONE ENCOUNTER
Returned patients call. I asked her when she picked up her medication Tessalon since she had a visit on 1/31. She said she had someone pick it up yesterday that is why she started taking it last night. I told her that unfortunately dr. Peters isn't able to extend her work note another week but he can send in another medication to help her coughing. She denied prednisone.   She stated that she has the flu and was diagnoses last Sunday at [place] and she works at the [employer] and is worried about people coming in and out with sicknesses. She was asking for more time off due to her age.    I informed her that id send a message back to the doctor to let him know what we spoke about.

## 2024-02-02 NOTE — TELEPHONE ENCOUNTER
Patient just started taking their Tessalon last night and they feel Monday 2/5 will be too soon to return to work. They asked if we can extend the date to Monday 2/12 and write a new letter.

## 2024-02-06 ENCOUNTER — TELEPHONE (OUTPATIENT)
Dept: PRIMARY CARE CLINIC | Facility: CLINIC | Age: 59
End: 2024-02-06

## 2024-02-06 NOTE — TELEPHONE ENCOUNTER
Patient returned my call.  She returned to work yesterday.  I informed that Dr. Peters said that he write a letter stating patient can return back on 2/7. She said she couldn't get into her my chart lawrence to retreive the new letter. She is going to call back this afternoon around 430 to lave me the fax number of her work place so I can fax her letter

## 2024-04-08 DIAGNOSIS — I51.7 CARDIOMEGALY: ICD-10-CM

## 2024-04-08 DIAGNOSIS — I10 ESSENTIAL (PRIMARY) HYPERTENSION: ICD-10-CM

## 2024-04-09 RX ORDER — METOPROLOL SUCCINATE 25 MG/1
25 TABLET, EXTENDED RELEASE ORAL DAILY
Qty: 90 TABLET | Refills: 0 | Status: SHIPPED | OUTPATIENT
Start: 2024-04-09

## 2024-04-09 RX ORDER — AMLODIPINE BESYLATE 10 MG/1
10 TABLET ORAL NIGHTLY
Qty: 90 TABLET | Refills: 0 | Status: SHIPPED | OUTPATIENT
Start: 2024-04-09

## 2024-05-02 ENCOUNTER — COMMUNITY OUTREACH (OUTPATIENT)
Dept: PRIMARY CARE CLINIC | Facility: CLINIC | Age: 59
End: 2024-05-02

## 2024-05-20 ENCOUNTER — OFFICE VISIT (OUTPATIENT)
Dept: PRIMARY CARE CLINIC | Facility: CLINIC | Age: 59
End: 2024-05-20
Payer: COMMERCIAL

## 2024-05-20 VITALS
BODY MASS INDEX: 44.41 KG/M2 | HEIGHT: 68 IN | WEIGHT: 293 LBS | HEART RATE: 60 BPM | TEMPERATURE: 97.8 F | DIASTOLIC BLOOD PRESSURE: 88 MMHG | SYSTOLIC BLOOD PRESSURE: 139 MMHG | RESPIRATION RATE: 14 BRPM | OXYGEN SATURATION: 97 %

## 2024-05-20 DIAGNOSIS — E11.9 TYPE 2 DIABETES MELLITUS WITHOUT COMPLICATION, UNSPECIFIED WHETHER LONG TERM INSULIN USE (HCC): ICD-10-CM

## 2024-05-20 DIAGNOSIS — E55.9 VITAMIN D DEFICIENCY: ICD-10-CM

## 2024-05-20 DIAGNOSIS — K57.30 DIVERTICULOSIS OF LARGE INTESTINE WITHOUT PERFORATION OR ABSCESS WITHOUT BLEEDING: ICD-10-CM

## 2024-05-20 DIAGNOSIS — Z00.00 ANNUAL PHYSICAL EXAM: ICD-10-CM

## 2024-05-20 DIAGNOSIS — J30.1 SEASONAL ALLERGIC RHINITIS DUE TO POLLEN: ICD-10-CM

## 2024-05-20 DIAGNOSIS — Z12.31 ENCOUNTER FOR SCREENING MAMMOGRAM FOR MALIGNANT NEOPLASM OF BREAST: ICD-10-CM

## 2024-05-20 DIAGNOSIS — I10 ESSENTIAL (PRIMARY) HYPERTENSION: Primary | ICD-10-CM

## 2024-05-20 DIAGNOSIS — Z12.11 COLON CANCER SCREENING: ICD-10-CM

## 2024-05-20 PROCEDURE — 3075F SYST BP GE 130 - 139MM HG: CPT | Performed by: INTERNAL MEDICINE

## 2024-05-20 PROCEDURE — 3079F DIAST BP 80-89 MM HG: CPT | Performed by: INTERNAL MEDICINE

## 2024-05-20 PROCEDURE — 99396 PREV VISIT EST AGE 40-64: CPT | Performed by: INTERNAL MEDICINE

## 2024-05-20 RX ORDER — FLUTICASONE PROPIONATE 50 MCG
2 SPRAY, SUSPENSION (ML) NASAL DAILY
Qty: 16 G | Refills: 5 | Status: SHIPPED | OUTPATIENT
Start: 2024-05-20 | End: 2024-05-27

## 2024-05-20 SDOH — ECONOMIC STABILITY: INCOME INSECURITY: HOW HARD IS IT FOR YOU TO PAY FOR THE VERY BASICS LIKE FOOD, HOUSING, MEDICAL CARE, AND HEATING?: NOT HARD AT ALL

## 2024-05-20 SDOH — ECONOMIC STABILITY: HOUSING INSECURITY
IN THE LAST 12 MONTHS, WAS THERE A TIME WHEN YOU DID NOT HAVE A STEADY PLACE TO SLEEP OR SLEPT IN A SHELTER (INCLUDING NOW)?: NO

## 2024-05-20 SDOH — ECONOMIC STABILITY: FOOD INSECURITY: WITHIN THE PAST 12 MONTHS, THE FOOD YOU BOUGHT JUST DIDN'T LAST AND YOU DIDN'T HAVE MONEY TO GET MORE.: NEVER TRUE

## 2024-05-20 SDOH — ECONOMIC STABILITY: FOOD INSECURITY: WITHIN THE PAST 12 MONTHS, YOU WORRIED THAT YOUR FOOD WOULD RUN OUT BEFORE YOU GOT MONEY TO BUY MORE.: NEVER TRUE

## 2024-05-20 ASSESSMENT — PATIENT HEALTH QUESTIONNAIRE - PHQ9
SUM OF ALL RESPONSES TO PHQ QUESTIONS 1-9: 0
2. FEELING DOWN, DEPRESSED OR HOPELESS: NOT AT ALL
SUM OF ALL RESPONSES TO PHQ QUESTIONS 1-9: 0
1. LITTLE INTEREST OR PLEASURE IN DOING THINGS: NOT AT ALL
SUM OF ALL RESPONSES TO PHQ QUESTIONS 1-9: 0
SUM OF ALL RESPONSES TO PHQ QUESTIONS 1-9: 0
SUM OF ALL RESPONSES TO PHQ9 QUESTIONS 1 & 2: 0

## 2024-05-20 NOTE — PROGRESS NOTES
/88 (Site: Left Upper Arm, Position: Sitting, Cuff Size: Large Adult)   Pulse 60   Temp 97.8 °F (36.6 °C) (Oral)   Resp 14   Ht 1.727 m (5' 8\")   Wt (!) 149 kg (328 lb 6.4 oz)   SpO2 97%   BMI 49.93 kg/m²      \"Have you been to the ER, urgent care clinic since your last visit?  Hospitalized since your last visit?\"    YES - When: approximately 6 months ago.  Where and Why:  [facility]., She had the flu    “Have you seen or consulted any other health care providers outside of [facility] since your last visit?”    NO    Have you had a mammogram?”   NO    Date of last Mammogram: 9/9/2019      “Have you had a pap smear?”    NO, patient's period ceased some time ago.      No cervical cancer screening on file         “Have you had a colorectal cancer screening such as a colonoscopy/FIT/Cologuard?    NO    No colonoscopy on file  No cologuard on file  No FIT/FOBT on file   No flexible sigmoidoscopy on file         Click Here for Release of Records Request   
Hemoglobin A1C; Future  -     Lipid Panel; Future  -     Urinalysis; Future  -     Comprehensive Metabolic Panel; Future  -     TSH; Future  -     Vitamin D 25 Hydroxy; Future  -     Microalbumin / Creatinine Urine Ratio; Future  5. Vitamin D deficiency  -     Vitamin D 25 Hydroxy; Future  6. Encounter for screening mammogram for malignant neoplasm of breast  -     BETTY MORIS DIGITAL SCREEN BILATERAL; Future  7. Colon cancer screening  -     [facility] - [facility]wood [facility] ([address])  8. Seasonal allergic rhinitis due to pollen  -     fluticasone (FLONASE) 50 MCG/ACT nasal spray; 2 sprays by Each Nostril route daily for 7 days, Disp-16 g, R-5Normal         Explained to patient risk benefits of the medications.Advised patient to stop meds if having any side effects.Pt verbalized understanding of the instructions.    I have discussed the diagnosis with the patient and the intended plan as seen in the above orders.  The patient has received an after-visit summary and questions were answered concerning future plans.  I have discussed medication side effects and warnings with the patient as well. I have reviewed the plan of care with the patient, accepted their input and they are in agreement with the treatment goals.     Reviewed plan of care. Patient has provided input and agrees with goals.    No follow-up provider specified.    Crispin Peters MD

## 2024-05-21 LAB
25(OH)D3 SERPL-MCNC: 30.2 NG/ML (ref 30–100)
ALBUMIN SERPL-MCNC: 3.4 G/DL (ref 3.5–5)
ALBUMIN/GLOB SERPL: 0.7 (ref 1.1–2.2)
ALP SERPL-CCNC: 86 U/L (ref 45–117)
ALT SERPL-CCNC: 15 U/L (ref 12–78)
ANION GAP SERPL CALC-SCNC: 4 MMOL/L (ref 5–15)
APPEARANCE UR: ABNORMAL
AST SERPL-CCNC: 18 U/L (ref 15–37)
BILIRUB SERPL-MCNC: 0.4 MG/DL (ref 0.2–1)
BILIRUB UR QL: NEGATIVE
BUN SERPL-MCNC: 12 MG/DL (ref 6–20)
BUN/CREAT SERPL: 16 (ref 12–20)
CALCIUM SERPL-MCNC: 9.3 MG/DL (ref 8.5–10.1)
CHLORIDE SERPL-SCNC: 111 MMOL/L (ref 97–108)
CHOLEST SERPL-MCNC: 166 MG/DL
CO2 SERPL-SCNC: 27 MMOL/L (ref 21–32)
COLOR UR: ABNORMAL
CREAT SERPL-MCNC: 0.75 MG/DL (ref 0.55–1.02)
CREAT UR-MCNC: 227 MG/DL
ERYTHROCYTE [DISTWIDTH] IN BLOOD BY AUTOMATED COUNT: 14.9 % (ref 11.5–14.5)
EST. AVERAGE GLUCOSE BLD GHB EST-MCNC: 131 MG/DL
GLOBULIN SER CALC-MCNC: 4.6 G/DL (ref 2–4)
GLUCOSE SERPL-MCNC: 82 MG/DL (ref 65–100)
GLUCOSE UR STRIP.AUTO-MCNC: NEGATIVE MG/DL
HBA1C MFR BLD: 6.2 % (ref 4–5.6)
HCT VFR BLD AUTO: 38.1 % (ref 35–47)
HDLC SERPL-MCNC: 52 MG/DL
HDLC SERPL: 3.2 (ref 0–5)
HGB BLD-MCNC: 12 G/DL (ref 11.5–16)
HGB UR QL STRIP: NEGATIVE
KETONES UR QL STRIP.AUTO: NEGATIVE MG/DL
LDLC SERPL CALC-MCNC: 101.2 MG/DL (ref 0–100)
LEUKOCYTE ESTERASE UR QL STRIP.AUTO: ABNORMAL
MCH RBC QN AUTO: 27.5 PG (ref 26–34)
MCHC RBC AUTO-ENTMCNC: 31.5 G/DL (ref 30–36.5)
MCV RBC AUTO: 87.4 FL (ref 80–99)
MICROALBUMIN UR-MCNC: 2.79 MG/DL
MICROALBUMIN/CREAT UR-RTO: 12 MG/G (ref 0–30)
NITRITE UR QL STRIP.AUTO: POSITIVE
NRBC # BLD: 0 K/UL (ref 0–0.01)
NRBC BLD-RTO: 0 PER 100 WBC
PH UR STRIP: 5 (ref 5–8)
PLATELET # BLD AUTO: 315 K/UL (ref 150–400)
PMV BLD AUTO: 9 FL (ref 8.9–12.9)
POTASSIUM SERPL-SCNC: 3.8 MMOL/L (ref 3.5–5.1)
PROT SERPL-MCNC: 8 G/DL (ref 6.4–8.2)
PROT UR STRIP-MCNC: NEGATIVE MG/DL
RBC # BLD AUTO: 4.36 M/UL (ref 3.8–5.2)
SODIUM SERPL-SCNC: 142 MMOL/L (ref 136–145)
SP GR UR REFRACTOMETRY: 1.03 (ref 1–1.03)
TRIGL SERPL-MCNC: 64 MG/DL
TSH SERPL DL<=0.05 MIU/L-ACNC: 0.75 UIU/ML (ref 0.36–3.74)
UROBILINOGEN UR QL STRIP.AUTO: 0.2 EU/DL (ref 0.2–1)
VLDLC SERPL CALC-MCNC: 12.8 MG/DL
WBC # BLD AUTO: 6.5 K/UL (ref 3.6–11)

## 2024-05-29 ENCOUNTER — TELEPHONE (OUTPATIENT)
Dept: PRIMARY CARE CLINIC | Facility: CLINIC | Age: 59
End: 2024-05-29

## 2024-05-29 NOTE — TELEPHONE ENCOUNTER
Left message for patient to schedule mammogram ordered by PCP  Requested patient to return call to panel manager at [phone] to schedule mammogram or notify that mammogram   has been completed at an outside facility.

## 2024-08-14 DIAGNOSIS — I51.7 CARDIOMEGALY: ICD-10-CM

## 2024-08-14 DIAGNOSIS — I10 ESSENTIAL (PRIMARY) HYPERTENSION: ICD-10-CM

## 2024-08-14 RX ORDER — METOPROLOL SUCCINATE 25 MG/1
25 TABLET, EXTENDED RELEASE ORAL DAILY
Qty: 90 TABLET | Refills: 0 | Status: SHIPPED | OUTPATIENT
Start: 2024-08-14

## 2024-08-14 RX ORDER — AMLODIPINE BESYLATE 10 MG/1
10 TABLET ORAL NIGHTLY
Qty: 90 TABLET | Refills: 0 | Status: SHIPPED | OUTPATIENT
Start: 2024-08-14

## 2024-08-14 NOTE — TELEPHONE ENCOUNTER
PCP: Crispin Peters MD    Last Visit 5/20/2024   Future Appointments   Date Time Provider Department Center   9/11/2024 10:45 AM Crispin Peters MD [facility] DEP       Requested Prescriptions      No prescriptions requested or ordered in this encounter         Other Comments: Last Refill

## 2024-08-14 NOTE — TELEPHONE ENCOUNTER
Patient calling to request a refill of her amlodipine and metoprolol for 90 days supply. Patient would like refill sent to [facility]/PHARMACY #[id number] - [place] - [address] -  [phone] - f [phone] [[id number]]

## 2024-08-21 RX ORDER — IBUPROFEN 600 MG/1
600 TABLET ORAL EVERY 6 HOURS PRN
Qty: 20 TABLET | Refills: 0 | Status: SHIPPED | OUTPATIENT
Start: 2024-08-21 | End: 2024-08-26

## 2024-08-21 NOTE — TELEPHONE ENCOUNTER
Patient called in,     Has an appointment next month but wanted to know if you can refill her Ibuprofen 600mg.     LOV 5/20/24

## 2024-09-11 ENCOUNTER — OFFICE VISIT (OUTPATIENT)
Dept: PRIMARY CARE CLINIC | Facility: CLINIC | Age: 59
End: 2024-09-11
Payer: COMMERCIAL

## 2024-09-11 VITALS
HEIGHT: 68 IN | HEART RATE: 68 BPM | SYSTOLIC BLOOD PRESSURE: 142 MMHG | TEMPERATURE: 97.8 F | RESPIRATION RATE: 18 BRPM | DIASTOLIC BLOOD PRESSURE: 84 MMHG | BODY MASS INDEX: 44.41 KG/M2 | OXYGEN SATURATION: 99 % | WEIGHT: 293 LBS

## 2024-09-11 DIAGNOSIS — E66.01 CLASS 3 SEVERE OBESITY DUE TO EXCESS CALORIES WITHOUT SERIOUS COMORBIDITY WITH BODY MASS INDEX (BMI) OF 50.0 TO 59.9 IN ADULT (HCC): ICD-10-CM

## 2024-09-11 DIAGNOSIS — F51.01 PRIMARY INSOMNIA: Primary | ICD-10-CM

## 2024-09-11 DIAGNOSIS — I10 ESSENTIAL (PRIMARY) HYPERTENSION: ICD-10-CM

## 2024-09-11 PROCEDURE — 99213 OFFICE O/P EST LOW 20 MIN: CPT | Performed by: INTERNAL MEDICINE

## 2024-09-11 PROCEDURE — 3079F DIAST BP 80-89 MM HG: CPT | Performed by: INTERNAL MEDICINE

## 2024-09-11 PROCEDURE — 3077F SYST BP >= 140 MM HG: CPT | Performed by: INTERNAL MEDICINE

## 2024-09-11 RX ORDER — SEMAGLUTIDE 0.25 MG/.5ML
0.25 INJECTION, SOLUTION SUBCUTANEOUS
Qty: 3 ML | Refills: 4 | Status: SHIPPED | OUTPATIENT
Start: 2024-09-11

## 2024-11-05 ENCOUNTER — TELEPHONE (OUTPATIENT)
Dept: PRIMARY CARE CLINIC | Facility: CLINIC | Age: 59
End: 2024-11-05

## 2024-11-05 DIAGNOSIS — I51.7 CARDIOMEGALY: ICD-10-CM

## 2024-11-05 DIAGNOSIS — I10 ESSENTIAL (PRIMARY) HYPERTENSION: ICD-10-CM

## 2024-11-05 DIAGNOSIS — E66.01 CLASS 3 SEVERE OBESITY DUE TO EXCESS CALORIES WITHOUT SERIOUS COMORBIDITY WITH BODY MASS INDEX (BMI) OF 50.0 TO 59.9 IN ADULT: ICD-10-CM

## 2024-11-05 DIAGNOSIS — E66.813 CLASS 3 SEVERE OBESITY DUE TO EXCESS CALORIES WITHOUT SERIOUS COMORBIDITY WITH BODY MASS INDEX (BMI) OF 50.0 TO 59.9 IN ADULT: ICD-10-CM

## 2024-11-05 RX ORDER — SEMAGLUTIDE 0.25 MG/.5ML
0.25 INJECTION, SOLUTION SUBCUTANEOUS
Qty: 3 ML | Refills: 4 | Status: SHIPPED | OUTPATIENT
Start: 2024-11-05

## 2024-11-05 RX ORDER — AMLODIPINE BESYLATE 10 MG/1
10 TABLET ORAL NIGHTLY
Qty: 90 TABLET | Refills: 1 | Status: SHIPPED | OUTPATIENT
Start: 2024-11-05

## 2024-11-05 RX ORDER — METOPROLOL SUCCINATE 25 MG/1
25 TABLET, EXTENDED RELEASE ORAL DAILY
Qty: 90 TABLET | Refills: 1 | Status: SHIPPED | OUTPATIENT
Start: 2024-11-05

## 2024-11-05 NOTE — TELEPHONE ENCOUNTER
Patient calling asking the nurse to call the pharmacy about her wegovy medication. She stated that she is having issues picking it up.

## 2024-11-05 NOTE — TELEPHONE ENCOUNTER
PCP: Crispin Peters MD    Last Visit 9/11/2024   Future Appointments   Date Time Provider Department Center   3/12/2025  9:00 AM Crispin Peters MD [facility]       Requested Prescriptions     Pending Prescriptions Disp Refills    amLODIPine (NORVASC) 10 MG tablet [Pharmacy Med Name: AMLODIPINE BESYLATE 10 MG TAB] 90 tablet 0     Sig: TAKE 1 TABLET BY MOUTH EVERY DAY AT NIGHT    metoprolol succinate (TOPROL XL) 25 MG extended release tablet [Pharmacy Med Name: METOPROLOL SUCC ER 25 MG TAB] 90 tablet 0     Sig: TAKE 1 TABLET BY MOUTH EVERY DAY         Other Comments: Last Refill   08/14/24

## 2024-11-06 NOTE — TELEPHONE ENCOUNTER
Pa for Wegovy has been started and now we are waiting to hear back about approval and denial     \"Wait for Determination  Please wait for [facility] to return a determination.\"

## 2024-11-07 ENCOUNTER — TELEPHONE (OUTPATIENT)
Dept: PRIMARY CARE CLINIC | Facility: CLINIC | Age: 59
End: 2024-11-07

## 2024-11-07 DIAGNOSIS — E66.813 CLASS 3 SEVERE OBESITY DUE TO EXCESS CALORIES WITHOUT SERIOUS COMORBIDITY WITH BODY MASS INDEX (BMI) OF 50.0 TO 59.9 IN ADULT: Primary | ICD-10-CM

## 2024-11-07 DIAGNOSIS — E66.01 CLASS 3 SEVERE OBESITY DUE TO EXCESS CALORIES WITHOUT SERIOUS COMORBIDITY WITH BODY MASS INDEX (BMI) OF 50.0 TO 59.9 IN ADULT: Primary | ICD-10-CM

## 2024-11-07 NOTE — TELEPHONE ENCOUNTER
Per Insurance- This is not a preferred drug for your health plan (Wegovy). You have problems managing your weight. You must try Saxenda first (prior authorization required). Additionally, your doctor must submit medical records that include all of the followin) You have no acute pancreatitis, acute suicidal behavior/ideation, personal or family history of medullary thyroid cancer or multiple endocrine neoplasia 2 syndrome 2) You have tried nutritional counseling, a physical activity program, and will continue your weight loss treatment plan 3) You are not also using Victoza or other glucagon-like peptide-1 inhibitor drugs 4) You have tried a non glucagon-like peptide-1 inhibitor drug for weight loss such as phentermine, orlistat, and more (all require prior authorization)

## 2024-11-11 ENCOUNTER — TELEPHONE (OUTPATIENT)
Age: 59
End: 2024-11-11

## 2024-12-17 ENCOUNTER — TELEPHONE (OUTPATIENT)
Age: 59
End: 2024-12-17

## 2024-12-17 NOTE — TELEPHONE ENCOUNTER
Called patient regarding referral to our [facility]. Patient did not answer so I left a vmail with our contact information.

## 2025-04-30 ENCOUNTER — TELEPHONE (OUTPATIENT)
Dept: PRIMARY CARE CLINIC | Facility: CLINIC | Age: 60
End: 2025-04-30

## 2025-04-30 RX ORDER — FLUTICASONE PROPIONATE 50 MCG
2 SPRAY, SUSPENSION (ML) NASAL DAILY
Qty: 16 G | Refills: 3 | Status: SHIPPED | OUTPATIENT
Start: 2025-04-30

## 2025-05-14 DIAGNOSIS — I51.7 CARDIOMEGALY: ICD-10-CM

## 2025-05-14 DIAGNOSIS — I10 ESSENTIAL (PRIMARY) HYPERTENSION: ICD-10-CM

## 2025-05-14 RX ORDER — AMLODIPINE BESYLATE 10 MG/1
10 TABLET ORAL NIGHTLY
Qty: 90 TABLET | Refills: 0 | Status: SHIPPED | OUTPATIENT
Start: 2025-05-14

## 2025-05-14 RX ORDER — METOPROLOL SUCCINATE 25 MG/1
25 TABLET, EXTENDED RELEASE ORAL DAILY
Qty: 90 TABLET | Refills: 0 | Status: SHIPPED | OUTPATIENT
Start: 2025-05-14

## 2025-06-24 ENCOUNTER — OFFICE VISIT (OUTPATIENT)
Dept: PRIMARY CARE CLINIC | Facility: CLINIC | Age: 60
End: 2025-06-24
Payer: COMMERCIAL

## 2025-06-24 VITALS
OXYGEN SATURATION: 99 % | HEART RATE: 60 BPM | RESPIRATION RATE: 18 BRPM | SYSTOLIC BLOOD PRESSURE: 130 MMHG | HEIGHT: 68 IN | WEIGHT: 293 LBS | BODY MASS INDEX: 44.41 KG/M2 | TEMPERATURE: 98.5 F | DIASTOLIC BLOOD PRESSURE: 82 MMHG

## 2025-06-24 DIAGNOSIS — I51.7 CARDIOMEGALY: ICD-10-CM

## 2025-06-24 DIAGNOSIS — E11.9 TYPE 2 DIABETES MELLITUS WITHOUT COMPLICATION, UNSPECIFIED WHETHER LONG TERM INSULIN USE (HCC): ICD-10-CM

## 2025-06-24 DIAGNOSIS — I10 ESSENTIAL (PRIMARY) HYPERTENSION: ICD-10-CM

## 2025-06-24 DIAGNOSIS — E55.9 VITAMIN D DEFICIENCY: ICD-10-CM

## 2025-06-24 DIAGNOSIS — Z12.31 ENCOUNTER FOR SCREENING MAMMOGRAM FOR MALIGNANT NEOPLASM OF BREAST: ICD-10-CM

## 2025-06-24 DIAGNOSIS — Z00.00 ANNUAL PHYSICAL EXAM: ICD-10-CM

## 2025-06-24 DIAGNOSIS — Z23 NEED FOR VACCINATION: ICD-10-CM

## 2025-06-24 DIAGNOSIS — E66.813 CLASS 3 SEVERE OBESITY DUE TO EXCESS CALORIES WITHOUT SERIOUS COMORBIDITY WITH BODY MASS INDEX (BMI) OF 50.0 TO 59.9 IN ADULT (HCC): ICD-10-CM

## 2025-06-24 DIAGNOSIS — Z12.11 COLON CANCER SCREENING: ICD-10-CM

## 2025-06-24 DIAGNOSIS — Z00.00 ANNUAL PHYSICAL EXAM: Primary | ICD-10-CM

## 2025-06-24 LAB
25(OH)D3 SERPL-MCNC: 14 NG/ML (ref 30–100)
ALBUMIN SERPL-MCNC: 3.4 G/DL (ref 3.5–5)
ALBUMIN/GLOB SERPL: 0.8 (ref 1.1–2.2)
ALP SERPL-CCNC: 92 U/L (ref 45–117)
ALT SERPL-CCNC: 16 U/L (ref 12–78)
ANION GAP SERPL CALC-SCNC: 5 MMOL/L (ref 2–12)
AST SERPL-CCNC: 20 U/L (ref 15–37)
BILIRUB SERPL-MCNC: 0.2 MG/DL (ref 0.2–1)
BUN SERPL-MCNC: 14 MG/DL (ref 6–20)
BUN/CREAT SERPL: 15 (ref 12–20)
CALCIUM SERPL-MCNC: 9.1 MG/DL (ref 8.5–10.1)
CHLORIDE SERPL-SCNC: 109 MMOL/L (ref 97–108)
CHOLEST SERPL-MCNC: 177 MG/DL
CO2 SERPL-SCNC: 29 MMOL/L (ref 21–32)
CREAT SERPL-MCNC: 0.91 MG/DL (ref 0.55–1.02)
ERYTHROCYTE [DISTWIDTH] IN BLOOD BY AUTOMATED COUNT: 14.6 % (ref 11.5–14.5)
EST. AVERAGE GLUCOSE BLD GHB EST-MCNC: 128 MG/DL
GLOBULIN SER CALC-MCNC: 4.1 G/DL (ref 2–4)
GLUCOSE SERPL-MCNC: 102 MG/DL (ref 65–100)
HBA1C MFR BLD: 6.1 % (ref 4–5.6)
HCT VFR BLD AUTO: 40.5 % (ref 35–47)
HDLC SERPL-MCNC: 48 MG/DL
HDLC SERPL: 3.7 (ref 0–5)
HGB BLD-MCNC: 11.8 G/DL (ref 11.5–16)
LDLC SERPL CALC-MCNC: 114.2 MG/DL (ref 0–100)
MCH RBC QN AUTO: 27.2 PG (ref 26–34)
MCHC RBC AUTO-ENTMCNC: 29.1 G/DL (ref 30–36.5)
MCV RBC AUTO: 93.3 FL (ref 80–99)
NRBC # BLD: 0 K/UL (ref 0–0.01)
NRBC BLD-RTO: 0 PER 100 WBC
PLATELET # BLD AUTO: 329 K/UL (ref 150–400)
PMV BLD AUTO: 9.6 FL (ref 8.9–12.9)
POTASSIUM SERPL-SCNC: 3.8 MMOL/L (ref 3.5–5.1)
PROT SERPL-MCNC: 7.5 G/DL (ref 6.4–8.2)
RBC # BLD AUTO: 4.34 M/UL (ref 3.8–5.2)
SODIUM SERPL-SCNC: 143 MMOL/L (ref 136–145)
TRIGL SERPL-MCNC: 74 MG/DL
TSH SERPL DL<=0.05 MIU/L-ACNC: 0.47 UIU/ML (ref 0.36–3.74)
VLDLC SERPL CALC-MCNC: 14.8 MG/DL
WBC # BLD AUTO: 7 K/UL (ref 3.6–11)

## 2025-06-24 PROCEDURE — 90471 IMMUNIZATION ADMIN: CPT | Performed by: INTERNAL MEDICINE

## 2025-06-24 PROCEDURE — 99396 PREV VISIT EST AGE 40-64: CPT | Performed by: INTERNAL MEDICINE

## 2025-06-24 PROCEDURE — 3079F DIAST BP 80-89 MM HG: CPT | Performed by: INTERNAL MEDICINE

## 2025-06-24 PROCEDURE — 3075F SYST BP GE 130 - 139MM HG: CPT | Performed by: INTERNAL MEDICINE

## 2025-06-24 PROCEDURE — 90715 TDAP VACCINE 7 YRS/> IM: CPT | Performed by: INTERNAL MEDICINE

## 2025-06-24 RX ORDER — ZOSTER VACCINE RECOMBINANT, ADJUVANTED 50 MCG/0.5
0.5 KIT INTRAMUSCULAR SEE ADMIN INSTRUCTIONS
Qty: 0.5 ML | Refills: 0 | Status: SHIPPED | OUTPATIENT
Start: 2025-06-24 | End: 2025-06-25

## 2025-06-24 RX ORDER — METOPROLOL SUCCINATE 25 MG/1
25 TABLET, EXTENDED RELEASE ORAL DAILY
Qty: 90 TABLET | Refills: 3 | Status: SHIPPED | OUTPATIENT
Start: 2025-06-24

## 2025-06-24 RX ORDER — AMLODIPINE BESYLATE 10 MG/1
10 TABLET ORAL NIGHTLY
Qty: 90 TABLET | Refills: 3 | Status: SHIPPED | OUTPATIENT
Start: 2025-06-24

## 2025-06-24 SDOH — ECONOMIC STABILITY: FOOD INSECURITY: WITHIN THE PAST 12 MONTHS, YOU WORRIED THAT YOUR FOOD WOULD RUN OUT BEFORE YOU GOT MONEY TO BUY MORE.: NEVER TRUE

## 2025-06-24 SDOH — ECONOMIC STABILITY: FOOD INSECURITY: WITHIN THE PAST 12 MONTHS, THE FOOD YOU BOUGHT JUST DIDN'T LAST AND YOU DIDN'T HAVE MONEY TO GET MORE.: NEVER TRUE

## 2025-06-24 ASSESSMENT — PATIENT HEALTH QUESTIONNAIRE - PHQ9
SUM OF ALL RESPONSES TO PHQ QUESTIONS 1-9: 0
1. LITTLE INTEREST OR PLEASURE IN DOING THINGS: NOT AT ALL
2. FEELING DOWN, DEPRESSED OR HOPELESS: NOT AT ALL
SUM OF ALL RESPONSES TO PHQ QUESTIONS 1-9: 0

## 2025-06-24 NOTE — PROGRESS NOTES
Jenise Guillen is a 60 y.o. female and presents with     Chief Complaint   Patient presents with    6 Month Follow-Up     Referral for mammogram, pap, colonoscopy        History of Present Illness  The patient presents for a routine checkup.    She is due for a mammogram and colonoscopy. She has not received her tetanus vaccine in the past decade. She receives all her vaccines at her workplace, including the COVID-19 vaccine. She has been residing in [place] for her entire life, except for her college years.    Her insurance did not approve Wegovy. She has experienced weight loss since her last visit, attributed to discontinuing metformin. She has reduced her uniform size from 3X to 2X as of last week.    She reports improved blood pressure readings. She is currently on metoprolol and amlodipine for blood pressure management. She mentioned an issue with her pharmacy providing only a 30-day supply of her blood pressure medication instead of the requested 90-day supply.       Past Medical History:   Diagnosis Date    HTN (hypertension)     Vertigo      Past Surgical History:   Procedure Laterality Date    CHOLECYSTECTOMY, LAPAROSCOPIC  10/04/2021    by Dr. Duran Fairbanks [facility]     Current Outpatient Medications   Medication Sig    amLODIPine (NORVASC) 10 MG tablet Take 1 tablet by mouth nightly    metoprolol succinate (TOPROL XL) 25 MG extended release tablet Take 1 tablet by mouth daily    metFORMIN (GLUCOPHAGE) 500 MG tablet Take 1 tablet by mouth 2 times daily (with meals)    zoster recombinant adjuvanted vaccine (SHINGRIX) 50 MCG/0.5ML SUSR injection Inject 0.5 mLs into the muscle See Admin Instructions for 1 day    fluticasone (FLONASE) 50 MCG/ACT nasal spray 2 sprays by Each Nostril route daily    ELDERBERRY PO Take by mouth    chlorthalidone (HYGROTON) 25 MG tablet Take 1 tablet by mouth daily    valsartan (DIOVAN) 160 MG tablet Take 1 tablet by mouth 2 times daily    ibuprofen (ADVIL;MOTRIN) 600 MG tablet Take 1

## 2025-06-24 NOTE — PROGRESS NOTES
Have you been to the ER, urgent care clinic since your last visit?  Hospitalized since your last visit?   NO    Have you seen or consulted any other health care providers outside our system since your last visit?   NO    Have you had a mammogram?”   NO    Date of last Mammogram: 9/9/2019      “Have you had a pap smear?”    NO    No cervical cancer screening on file       “Have you had a colorectal cancer screening such as a colonoscopy/FIT/Cologuard?    NO    No colonoscopy on file  No cologuard on file  No FIT/FOBT on file   No flexible sigmoidoscopy on file

## 2025-06-29 ENCOUNTER — RESULTS FOLLOW-UP (OUTPATIENT)
Dept: PRIMARY CARE CLINIC | Facility: CLINIC | Age: 60
End: 2025-06-29

## 2025-06-29 DIAGNOSIS — E55.9 VITAMIN D DEFICIENCY: Primary | ICD-10-CM

## 2025-06-29 RX ORDER — ERGOCALCIFEROL 1.25 MG/1
50000 CAPSULE, LIQUID FILLED ORAL WEEKLY
Qty: 12 CAPSULE | Refills: 1 | Status: SHIPPED | OUTPATIENT
Start: 2025-06-29

## 2025-07-16 ENCOUNTER — TELEPHONE (OUTPATIENT)
Dept: PRIMARY CARE CLINIC | Facility: CLINIC | Age: 60
End: 2025-07-16

## 2025-07-16 NOTE — TELEPHONE ENCOUNTER
Patient is calling because when Dr. Peters wrote her the scripts for -  metoprolol succinate (TOPROL XL) 25 MG extended release tablet    amLODIPine (NORVASC) 10 MG tablet    He had wrote it for 90 days but she said her pharmacy only gave her 30 days. She said she is needing a refill soon and asked may Dr. Peters send another script over to them for 90 days. She said she does not understand why they only gave her 30 days and no one in the pharmacy explained to her why it was given for 30 days. She needs for her new script to be sent to -    [facility]/pharmacy #[id number] - [place], [place] - [address] [place] [address] - P [phone] - F [phone]

## 2025-07-17 NOTE — TELEPHONE ENCOUNTER
Called and spoke with patients pharmacy. They stated that her insurance only covers 30 days for the BP medications. I called the patient to let her know this and to call us back if she has any trouble with the prescription again

## 2025-08-04 ENCOUNTER — HOSPITAL ENCOUNTER (EMERGENCY)
Facility: HOSPITAL | Age: 60
Discharge: HOME | End: 2025-08-04
Attending: EMERGENCY MEDICINE
Payer: COMMERCIAL

## 2025-08-04 VITALS
SYSTOLIC BLOOD PRESSURE: 167 MMHG | OXYGEN SATURATION: 96 % | WEIGHT: 293 LBS | HEIGHT: 68 IN | DIASTOLIC BLOOD PRESSURE: 97 MMHG | RESPIRATION RATE: 18 BRPM | TEMPERATURE: 98.8 F | HEART RATE: 56 BPM | BODY MASS INDEX: 44.41 KG/M2

## 2025-08-04 DIAGNOSIS — U07.1 ACUTE COVID-19: Primary | ICD-10-CM

## 2025-08-04 LAB
FLUAV RNA SPEC QL NAA+PROBE: NOT DETECTED
FLUBV RNA SPEC QL NAA+PROBE: NOT DETECTED
SARS-COV-2 RNA RESP QL NAA+PROBE: DETECTED
SOURCE: ABNORMAL

## 2025-08-04 PROCEDURE — 99283 EMERGENCY DEPT VISIT LOW MDM: CPT

## 2025-08-04 PROCEDURE — 87636 SARSCOV2 & INF A&B AMP PRB: CPT

## 2025-08-04 RX ORDER — BENZONATATE 100 MG/1
100 CAPSULE ORAL 3 TIMES DAILY PRN
Qty: 21 CAPSULE | Refills: 0 | Status: SHIPPED | OUTPATIENT
Start: 2025-08-04 | End: 2025-08-11

## 2025-08-04 RX ORDER — GUAIFENESIN 600 MG/1
600 TABLET, EXTENDED RELEASE ORAL 2 TIMES DAILY
Qty: 30 TABLET | Refills: 0 | Status: SHIPPED | OUTPATIENT
Start: 2025-08-04 | End: 2025-08-19

## 2025-08-04 ASSESSMENT — PAIN SCALES - GENERAL: PAINLEVEL_OUTOF10: 0

## 2025-08-26 RX ORDER — FLUTICASONE PROPIONATE 50 MCG
2 SPRAY, SUSPENSION (ML) NASAL DAILY
Qty: 48 ML | Refills: 2 | Status: SHIPPED | OUTPATIENT
Start: 2025-08-26

## 2025-09-02 DIAGNOSIS — R05.1 ACUTE COUGH: Primary | ICD-10-CM

## 2025-09-02 RX ORDER — BENZONATATE 100 MG/1
100 CAPSULE ORAL 3 TIMES DAILY PRN
Qty: 30 CAPSULE | Refills: 0 | Status: SHIPPED | OUTPATIENT
Start: 2025-09-02 | End: 2025-09-12

## (undated) DEVICE — GLOVE SURG SZ 8 L12IN FNGR THK79MIL GRN LTX FREE

## (undated) DEVICE — DERMABOND SKIN ADH 0.7ML -- DERMABOND ADVANCED 12/BX

## (undated) DEVICE — TROCAR: Brand: KII® SLEEVE

## (undated) DEVICE — C-ARM: Brand: UNBRANDED

## (undated) DEVICE — COVER LT HNDL PLAS RIG 1 PER PK

## (undated) DEVICE — TROCAR SITE CLOSURE DEVICE: Brand: ENDO CLOSE

## (undated) DEVICE — FILTER SMK EVAC FLO CLR MEGADYNE

## (undated) DEVICE — 4-PORT MANIFOLD: Brand: NEPTUNE 2

## (undated) DEVICE — Device

## (undated) DEVICE — BAG SPEC REM 224ML W4XL6IN DIA10MM 1 HND GYN DISP ENDOPCH

## (undated) DEVICE — GARMENT,MEDLINE,DVT,INT,CALF,MED, GEN2: Brand: MEDLINE

## (undated) DEVICE — NEEDLE HYPO 22GA L1.5IN BLK S STL HUB POLYPR SHLD REG BVL

## (undated) DEVICE — LAPAROSCOPIC TROCAR SLEEVE/SINGLE USE: Brand: KII® OPTICAL ACCESS SYSTEM

## (undated) DEVICE — ELECTRODE ES 36CM LAP FLAT L HK COAT DISP CLEANCOAT

## (undated) DEVICE — CLICKLINE SCISSORS INSERT: Brand: CLICKLINE

## (undated) DEVICE — DRAPE,REIN 53X77,STERILE: Brand: MEDLINE

## (undated) DEVICE — SUTURE SZ 0 27IN 5/8 CIR UR-6  TAPER PT VIOLET ABSRB VICRYL J603H

## (undated) DEVICE — SUTURE MCRYL SZ 4-0 L27IN ABSRB UD L19MM PS-2 1/2 CIR PRIM Y426H

## (undated) DEVICE — APPLIER CLP M/L SHFT DIA5MM 15 LIG LIGAMAX 5

## (undated) DEVICE — GLOVE ORANGE PI 7 1/2   MSG9075

## (undated) DEVICE — GENERAL LAPAROSCOPY - SMH: Brand: MEDLINE INDUSTRIES, INC.

## (undated) DEVICE — TROCAR: Brand: KII® OPTICAL ACCESS SYSTEM